# Patient Record
Sex: FEMALE | Race: WHITE | Employment: FULL TIME | URBAN - METROPOLITAN AREA
[De-identification: names, ages, dates, MRNs, and addresses within clinical notes are randomized per-mention and may not be internally consistent; named-entity substitution may affect disease eponyms.]

---

## 2017-01-01 ENCOUNTER — APPOINTMENT (OUTPATIENT)
Dept: RADIATION ONCOLOGY | Age: 57
End: 2017-01-01
Payer: COMMERCIAL

## 2017-01-01 ENCOUNTER — HOME CARE VISIT (OUTPATIENT)
Dept: SCHEDULING | Facility: HOME HEALTH | Age: 57
End: 2017-01-01
Payer: COMMERCIAL

## 2017-01-01 ENCOUNTER — HOME CARE VISIT (OUTPATIENT)
Dept: HOME HEALTH SERVICES | Facility: HOME HEALTH | Age: 57
End: 2017-01-01
Payer: COMMERCIAL

## 2017-01-01 ENCOUNTER — HOSPITAL ENCOUNTER (OUTPATIENT)
Dept: RADIATION ONCOLOGY | Age: 57
Discharge: HOME | End: 2017-03-08
Payer: COMMERCIAL

## 2017-01-01 ENCOUNTER — TELEPHONE (OUTPATIENT)
Dept: ONCOLOGY | Age: 57
End: 2017-01-01

## 2017-01-01 ENCOUNTER — APPOINTMENT (OUTPATIENT)
Dept: GENERAL RADIOLOGY | Age: 57
DRG: 654 | End: 2017-01-01
Attending: SURGERY
Payer: COMMERCIAL

## 2017-01-01 ENCOUNTER — APPOINTMENT (OUTPATIENT)
Dept: GENERAL RADIOLOGY | Age: 57
DRG: 552 | End: 2017-01-01
Attending: EMERGENCY MEDICINE
Payer: COMMERCIAL

## 2017-01-01 ENCOUNTER — HOSPITAL ENCOUNTER (OUTPATIENT)
Dept: INFUSION THERAPY | Age: 57
Discharge: HOME | End: 2017-04-21
Payer: COMMERCIAL

## 2017-01-01 ENCOUNTER — HOSPITAL ENCOUNTER (OUTPATIENT)
Dept: INFUSION THERAPY | Age: 57
Discharge: HOME | End: 2017-03-31
Payer: COMMERCIAL

## 2017-01-01 ENCOUNTER — HOSPITAL ENCOUNTER (OUTPATIENT)
Dept: INFUSION THERAPY | Age: 57
Discharge: HOME | End: 2017-06-30
Payer: COMMERCIAL

## 2017-01-01 ENCOUNTER — HOSPITAL ENCOUNTER (OUTPATIENT)
Dept: RADIATION ONCOLOGY | Age: 57
Discharge: HOME | End: 2017-04-25
Payer: COMMERCIAL

## 2017-01-01 ENCOUNTER — APPOINTMENT (OUTPATIENT)
Dept: INFUSION THERAPY | Age: 57
End: 2017-01-01
Payer: COMMERCIAL

## 2017-01-01 ENCOUNTER — HOSPITAL ENCOUNTER (OUTPATIENT)
Dept: INFUSION THERAPY | Age: 57
Discharge: HOME | End: 2017-06-28
Payer: COMMERCIAL

## 2017-01-01 ENCOUNTER — HOSPICE ADMISSION (OUTPATIENT)
Dept: HOSPICE | Facility: HOSPICE | Age: 57
End: 2017-01-01
Payer: COMMERCIAL

## 2017-01-01 ENCOUNTER — APPOINTMENT (OUTPATIENT)
Dept: MRI IMAGING | Age: 57
DRG: 654 | End: 2017-01-01
Attending: NURSE PRACTITIONER
Payer: COMMERCIAL

## 2017-01-01 ENCOUNTER — HOSPITAL ENCOUNTER (OUTPATIENT)
Dept: RADIATION ONCOLOGY | Age: 57
Discharge: HOME | End: 2017-02-27
Payer: COMMERCIAL

## 2017-01-01 ENCOUNTER — HOSPITAL ENCOUNTER (OUTPATIENT)
Dept: LAB | Age: 57
Discharge: HOME | End: 2017-04-13
Payer: COMMERCIAL

## 2017-01-01 ENCOUNTER — HOSPICE ADMISSION (OUTPATIENT)
Dept: HOSPICE | Facility: HOSPICE | Age: 57
End: 2017-01-01

## 2017-01-01 ENCOUNTER — HOSPITAL ENCOUNTER (OUTPATIENT)
Dept: INFUSION THERAPY | Age: 57
Discharge: HOME | End: 2017-01-18
Payer: COMMERCIAL

## 2017-01-01 ENCOUNTER — HOSPITAL ENCOUNTER (INPATIENT)
Age: 57
LOS: 3 days | Discharge: HOME | DRG: 690 | End: 2017-07-18
Attending: EMERGENCY MEDICINE | Admitting: INTERNAL MEDICINE
Payer: COMMERCIAL

## 2017-01-01 ENCOUNTER — HOSPITAL ENCOUNTER (OUTPATIENT)
Dept: RADIATION ONCOLOGY | Age: 57
End: 2017-01-01
Payer: COMMERCIAL

## 2017-01-01 ENCOUNTER — HOSPITAL ENCOUNTER (OUTPATIENT)
Dept: INFUSION THERAPY | Age: 57
Discharge: HOME | End: 2017-03-17
Payer: COMMERCIAL

## 2017-01-01 ENCOUNTER — APPOINTMENT (OUTPATIENT)
Dept: MRI IMAGING | Age: 57
DRG: 543 | End: 2017-01-01
Attending: NURSE PRACTITIONER
Payer: COMMERCIAL

## 2017-01-01 ENCOUNTER — HOSPITAL ENCOUNTER (OUTPATIENT)
Dept: PET IMAGING | Age: 57
Discharge: HOME | End: 2017-07-27
Attending: INTERNAL MEDICINE
Payer: COMMERCIAL

## 2017-01-01 ENCOUNTER — HOSPITAL ENCOUNTER (OUTPATIENT)
Dept: LAB | Age: 57
Discharge: HOME | End: 2017-10-02
Payer: COMMERCIAL

## 2017-01-01 ENCOUNTER — APPOINTMENT (OUTPATIENT)
Dept: INFUSION THERAPY | Age: 57
End: 2017-01-01

## 2017-01-01 ENCOUNTER — HOSPITAL ENCOUNTER (OUTPATIENT)
Dept: INFUSION THERAPY | Age: 57
End: 2017-01-01
Payer: COMMERCIAL

## 2017-01-01 ENCOUNTER — APPOINTMENT (OUTPATIENT)
Dept: GENERAL RADIOLOGY | Age: 57
DRG: 690 | End: 2017-01-01
Attending: EMERGENCY MEDICINE
Payer: COMMERCIAL

## 2017-01-01 ENCOUNTER — HOSPITAL ENCOUNTER (OUTPATIENT)
Dept: RADIATION ONCOLOGY | Age: 57
Discharge: HOME | End: 2017-03-02
Payer: COMMERCIAL

## 2017-01-01 ENCOUNTER — HOSPITAL ENCOUNTER (OUTPATIENT)
Dept: RADIATION ONCOLOGY | Age: 57
Discharge: HOME | End: 2017-02-28
Payer: COMMERCIAL

## 2017-01-01 ENCOUNTER — HOME CARE VISIT (OUTPATIENT)
Dept: HOME HEALTH SERVICES | Facility: HOME HEALTH | Age: 57
End: 2017-01-01

## 2017-01-01 ENCOUNTER — TELEPHONE (OUTPATIENT)
Dept: MRI IMAGING | Age: 57
End: 2017-01-01

## 2017-01-01 ENCOUNTER — APPOINTMENT (OUTPATIENT)
Dept: GENERAL RADIOLOGY | Age: 57
DRG: 552 | End: 2017-01-01
Attending: INTERNAL MEDICINE
Payer: COMMERCIAL

## 2017-01-01 ENCOUNTER — ANESTHESIA EVENT (OUTPATIENT)
Dept: SURGERY | Age: 57
DRG: 054 | End: 2017-01-01
Payer: COMMERCIAL

## 2017-01-01 ENCOUNTER — HOSPITAL ENCOUNTER (OUTPATIENT)
Dept: RADIATION ONCOLOGY | Age: 57
Discharge: HOME | End: 2017-03-01
Payer: COMMERCIAL

## 2017-01-01 ENCOUNTER — ANESTHESIA (OUTPATIENT)
Dept: SURGERY | Age: 57
DRG: 054 | End: 2017-01-01
Payer: COMMERCIAL

## 2017-01-01 ENCOUNTER — HOSPITAL ENCOUNTER (OUTPATIENT)
Dept: RADIATION ONCOLOGY | Age: 57
Discharge: HOME | End: 2017-02-22
Payer: COMMERCIAL

## 2017-01-01 ENCOUNTER — HOSPITAL ENCOUNTER (OUTPATIENT)
Dept: INFUSION THERAPY | Age: 57
Discharge: HOME | End: 2017-05-03
Payer: COMMERCIAL

## 2017-01-01 ENCOUNTER — HOSPITAL ENCOUNTER (OUTPATIENT)
Dept: CT IMAGING | Age: 57
Discharge: HOME | End: 2017-01-05
Attending: INTERNAL MEDICINE
Payer: COMMERCIAL

## 2017-01-01 ENCOUNTER — HOSPITAL ENCOUNTER (OUTPATIENT)
Dept: LAB | Age: 57
Discharge: HOME | End: 2017-03-08
Payer: COMMERCIAL

## 2017-01-01 ENCOUNTER — HOSPITAL ENCOUNTER (OUTPATIENT)
Dept: LAB | Age: 57
Discharge: HOME | End: 2017-01-31
Payer: COMMERCIAL

## 2017-01-01 ENCOUNTER — APPOINTMENT (OUTPATIENT)
Dept: CT IMAGING | Age: 57
DRG: 690 | End: 2017-01-01
Attending: EMERGENCY MEDICINE
Payer: COMMERCIAL

## 2017-01-01 ENCOUNTER — HOSPITAL ENCOUNTER (OUTPATIENT)
Dept: LAB | Age: 57
Discharge: HOME | End: 2017-05-16
Payer: COMMERCIAL

## 2017-01-01 ENCOUNTER — HOSPITAL ENCOUNTER (OUTPATIENT)
Dept: LAB | Age: 57
Discharge: HOME | End: 2017-06-27
Payer: COMMERCIAL

## 2017-01-01 ENCOUNTER — APPOINTMENT (OUTPATIENT)
Dept: GENERAL RADIOLOGY | Age: 57
DRG: 054 | End: 2017-01-01
Attending: EMERGENCY MEDICINE
Payer: COMMERCIAL

## 2017-01-01 ENCOUNTER — HOSPITAL ENCOUNTER (OUTPATIENT)
Dept: INFUSION THERAPY | Age: 57
Discharge: HOME | End: 2017-02-18
Payer: COMMERCIAL

## 2017-01-01 ENCOUNTER — ANESTHESIA EVENT (OUTPATIENT)
Dept: SURGERY | Age: 57
DRG: 654 | End: 2017-01-01
Payer: COMMERCIAL

## 2017-01-01 ENCOUNTER — HOSPITAL ENCOUNTER (OUTPATIENT)
Dept: INFUSION THERAPY | Age: 57
Discharge: HOME | End: 2017-02-01
Payer: COMMERCIAL

## 2017-01-01 ENCOUNTER — HOSPITAL ENCOUNTER (OUTPATIENT)
Dept: INFUSION THERAPY | Age: 57
End: 2017-01-01

## 2017-01-01 ENCOUNTER — HOSPITAL ENCOUNTER (OUTPATIENT)
Dept: INFUSION THERAPY | Age: 57
Discharge: HOME | End: 2017-04-19
Payer: COMMERCIAL

## 2017-01-01 ENCOUNTER — HOSPITAL ENCOUNTER (OUTPATIENT)
Dept: PET IMAGING | Age: 57
Discharge: HOME | End: 2017-04-11
Attending: INTERNAL MEDICINE
Payer: COMMERCIAL

## 2017-01-01 ENCOUNTER — HOSPITAL ENCOUNTER (OUTPATIENT)
Dept: RADIATION ONCOLOGY | Age: 57
Discharge: HOME | End: 2017-03-07
Payer: COMMERCIAL

## 2017-01-01 ENCOUNTER — HOSPITAL ENCOUNTER (OUTPATIENT)
Dept: LAB | Age: 57
Discharge: HOME | End: 2017-03-28
Payer: COMMERCIAL

## 2017-01-01 ENCOUNTER — HOSPITAL ENCOUNTER (INPATIENT)
Age: 57
LOS: 4 days | Discharge: HOME HEALTH | DRG: 054 | End: 2017-09-05
Attending: EMERGENCY MEDICINE | Admitting: INTERNAL MEDICINE
Payer: COMMERCIAL

## 2017-01-01 ENCOUNTER — HOSPITAL ENCOUNTER (INPATIENT)
Age: 57
LOS: 4 days | Discharge: HOME | DRG: 552 | End: 2017-08-18
Attending: EMERGENCY MEDICINE | Admitting: INTERNAL MEDICINE
Payer: COMMERCIAL

## 2017-01-01 ENCOUNTER — HOSPITAL ENCOUNTER (OUTPATIENT)
Dept: INFUSION THERAPY | Age: 57
Discharge: HOME | End: 2017-05-17
Payer: COMMERCIAL

## 2017-01-01 ENCOUNTER — HOSPITAL ENCOUNTER (OUTPATIENT)
Dept: LAB | Age: 57
Discharge: HOME | End: 2017-05-02
Payer: COMMERCIAL

## 2017-01-01 ENCOUNTER — HOSPITAL ENCOUNTER (OUTPATIENT)
Dept: LAB | Age: 57
Discharge: HOME | End: 2017-01-10
Payer: COMMERCIAL

## 2017-01-01 ENCOUNTER — HOSPITAL ENCOUNTER (OUTPATIENT)
Dept: RADIATION ONCOLOGY | Age: 57
Discharge: HOME | End: 2017-02-23
Payer: COMMERCIAL

## 2017-01-01 ENCOUNTER — HOSPITAL ENCOUNTER (OUTPATIENT)
Dept: RADIATION ONCOLOGY | Age: 57
Discharge: HOME | End: 2017-02-24
Payer: COMMERCIAL

## 2017-01-01 ENCOUNTER — HOSPITAL ENCOUNTER (OUTPATIENT)
Dept: RADIATION ONCOLOGY | Age: 57
End: 2017-01-01

## 2017-01-01 ENCOUNTER — APPOINTMENT (OUTPATIENT)
Dept: MRI IMAGING | Age: 57
DRG: 054 | End: 2017-01-01
Attending: HOSPITALIST
Payer: COMMERCIAL

## 2017-01-01 ENCOUNTER — HOSPITAL ENCOUNTER (OUTPATIENT)
Dept: INFUSION THERAPY | Age: 57
Discharge: HOME | End: 2017-03-29
Payer: COMMERCIAL

## 2017-01-01 ENCOUNTER — ANESTHESIA (OUTPATIENT)
Dept: SURGERY | Age: 57
DRG: 654 | End: 2017-01-01
Payer: COMMERCIAL

## 2017-01-01 ENCOUNTER — HOSPITAL ENCOUNTER (OUTPATIENT)
Dept: LAB | Age: 57
Discharge: HOME | End: 2017-07-24

## 2017-01-01 ENCOUNTER — HOME HEALTH ADMISSION (OUTPATIENT)
Dept: HOME HEALTH SERVICES | Facility: HOME HEALTH | Age: 57
End: 2017-01-01

## 2017-01-01 ENCOUNTER — HOSPITAL ENCOUNTER (OUTPATIENT)
Dept: INFUSION THERAPY | Age: 57
Discharge: HOME | End: 2017-02-03
Payer: COMMERCIAL

## 2017-01-01 ENCOUNTER — HOSPITAL ENCOUNTER (OUTPATIENT)
Dept: INFUSION THERAPY | Age: 57
Discharge: HOME | End: 2017-01-10
Payer: COMMERCIAL

## 2017-01-01 ENCOUNTER — HOME CARE VISIT (OUTPATIENT)
Dept: SCHEDULING | Facility: HOME HEALTH | Age: 57
End: 2017-01-01

## 2017-01-01 ENCOUNTER — HOSPITAL ENCOUNTER (OUTPATIENT)
Dept: INFUSION THERAPY | Age: 57
Discharge: HOME | End: 2017-04-04
Payer: COMMERCIAL

## 2017-01-01 ENCOUNTER — HOME HEALTH ADMISSION (OUTPATIENT)
Dept: HOME HEALTH SERVICES | Facility: HOME HEALTH | Age: 57
End: 2017-01-01
Payer: COMMERCIAL

## 2017-01-01 ENCOUNTER — HOSPITAL ENCOUNTER (OUTPATIENT)
Dept: RADIATION ONCOLOGY | Age: 57
Discharge: HOME | End: 2017-10-10
Payer: COMMERCIAL

## 2017-01-01 ENCOUNTER — HOSPITAL ENCOUNTER (OUTPATIENT)
Dept: RADIATION ONCOLOGY | Age: 57
Discharge: HOME | End: 2017-03-03
Payer: COMMERCIAL

## 2017-01-01 ENCOUNTER — HOSPITAL ENCOUNTER (INPATIENT)
Age: 57
LOS: 3 days | Discharge: HOME | DRG: 543 | End: 2017-02-24
Attending: EMERGENCY MEDICINE | Admitting: INTERNAL MEDICINE
Payer: COMMERCIAL

## 2017-01-01 ENCOUNTER — HOSPITAL ENCOUNTER (OUTPATIENT)
Dept: INFUSION THERAPY | Age: 57
Discharge: HOME | End: 2017-05-19
Payer: COMMERCIAL

## 2017-01-01 ENCOUNTER — HOSPITAL ENCOUNTER (OUTPATIENT)
Dept: LAB | Age: 57
Discharge: HOME | End: 2017-02-14
Payer: COMMERCIAL

## 2017-01-01 ENCOUNTER — HOSPITAL ENCOUNTER (OUTPATIENT)
Dept: RADIATION ONCOLOGY | Age: 57
Discharge: HOME | End: 2017-03-06
Payer: COMMERCIAL

## 2017-01-01 ENCOUNTER — HOSPITAL ENCOUNTER (OUTPATIENT)
Dept: INFUSION THERAPY | Age: 57
Discharge: HOME | End: 2017-01-20
Payer: COMMERCIAL

## 2017-01-01 ENCOUNTER — APPOINTMENT (OUTPATIENT)
Dept: CT IMAGING | Age: 57
DRG: 054 | End: 2017-01-01
Attending: EMERGENCY MEDICINE
Payer: COMMERCIAL

## 2017-01-01 ENCOUNTER — HOSPITAL ENCOUNTER (OUTPATIENT)
Dept: INFUSION THERAPY | Age: 57
Discharge: HOME | End: 2017-05-05
Payer: COMMERCIAL

## 2017-01-01 ENCOUNTER — HOSPITAL ENCOUNTER (INPATIENT)
Age: 57
LOS: 7 days | Discharge: HOME HEALTH | DRG: 654 | End: 2017-08-30
Attending: SURGERY | Admitting: SURGERY
Payer: COMMERCIAL

## 2017-01-01 ENCOUNTER — HOSPITAL ENCOUNTER (OUTPATIENT)
Dept: INFUSION THERAPY | Age: 57
Discharge: HOME | End: 2017-03-15
Payer: COMMERCIAL

## 2017-01-01 VITALS
TEMPERATURE: 98.6 F | SYSTOLIC BLOOD PRESSURE: 130 MMHG | DIASTOLIC BLOOD PRESSURE: 60 MMHG | HEART RATE: 89 BPM | OXYGEN SATURATION: 96 %

## 2017-01-01 VITALS
DIASTOLIC BLOOD PRESSURE: 86 MMHG | RESPIRATION RATE: 18 BRPM | OXYGEN SATURATION: 96 % | WEIGHT: 166.8 LBS | HEART RATE: 118 BPM | BODY MASS INDEX: 27.76 KG/M2 | TEMPERATURE: 98.2 F | SYSTOLIC BLOOD PRESSURE: 148 MMHG

## 2017-01-01 VITALS
HEART RATE: 146 BPM | RESPIRATION RATE: 16 BRPM | OXYGEN SATURATION: 93 % | SYSTOLIC BLOOD PRESSURE: 130 MMHG | TEMPERATURE: 97.4 F | DIASTOLIC BLOOD PRESSURE: 88 MMHG

## 2017-01-01 VITALS
BODY MASS INDEX: 27.62 KG/M2 | RESPIRATION RATE: 18 BRPM | TEMPERATURE: 98.6 F | SYSTOLIC BLOOD PRESSURE: 120 MMHG | HEART RATE: 142 BPM | WEIGHT: 166 LBS | OXYGEN SATURATION: 97 % | DIASTOLIC BLOOD PRESSURE: 84 MMHG

## 2017-01-01 VITALS
TEMPERATURE: 98.5 F | HEART RATE: 103 BPM | DIASTOLIC BLOOD PRESSURE: 74 MMHG | BODY MASS INDEX: 28.32 KG/M2 | RESPIRATION RATE: 18 BRPM | SYSTOLIC BLOOD PRESSURE: 120 MMHG | WEIGHT: 170.2 LBS | OXYGEN SATURATION: 98 %

## 2017-01-01 VITALS — BODY MASS INDEX: 27.99 KG/M2 | WEIGHT: 168 LBS | HEIGHT: 65 IN

## 2017-01-01 VITALS
TEMPERATURE: 98.4 F | WEIGHT: 168 LBS | DIASTOLIC BLOOD PRESSURE: 87 MMHG | BODY MASS INDEX: 27.96 KG/M2 | HEART RATE: 110 BPM | OXYGEN SATURATION: 96 % | RESPIRATION RATE: 18 BRPM | SYSTOLIC BLOOD PRESSURE: 138 MMHG

## 2017-01-01 VITALS
HEART RATE: 76 BPM | OXYGEN SATURATION: 97 % | TEMPERATURE: 98 F | SYSTOLIC BLOOD PRESSURE: 130 MMHG | DIASTOLIC BLOOD PRESSURE: 70 MMHG

## 2017-01-01 VITALS
OXYGEN SATURATION: 96 % | SYSTOLIC BLOOD PRESSURE: 124 MMHG | HEART RATE: 90 BPM | DIASTOLIC BLOOD PRESSURE: 80 MMHG | RESPIRATION RATE: 16 BRPM

## 2017-01-01 VITALS
SYSTOLIC BLOOD PRESSURE: 130 MMHG | DIASTOLIC BLOOD PRESSURE: 78 MMHG | RESPIRATION RATE: 17 BRPM | OXYGEN SATURATION: 97 % | TEMPERATURE: 99.7 F | HEART RATE: 90 BPM

## 2017-01-01 VITALS
OXYGEN SATURATION: 100 % | DIASTOLIC BLOOD PRESSURE: 84 MMHG | SYSTOLIC BLOOD PRESSURE: 142 MMHG | HEART RATE: 110 BPM | RESPIRATION RATE: 18 BRPM | BODY MASS INDEX: 29.2 KG/M2 | TEMPERATURE: 98.2 F | WEIGHT: 175.5 LBS

## 2017-01-01 VITALS
HEART RATE: 154 BPM | OXYGEN SATURATION: 94 % | DIASTOLIC BLOOD PRESSURE: 90 MMHG | SYSTOLIC BLOOD PRESSURE: 140 MMHG | RESPIRATION RATE: 14 BRPM | TEMPERATURE: 97.9 F

## 2017-01-01 VITALS
SYSTOLIC BLOOD PRESSURE: 148 MMHG | HEART RATE: 128 BPM | OXYGEN SATURATION: 99 % | RESPIRATION RATE: 18 BRPM | TEMPERATURE: 98 F | DIASTOLIC BLOOD PRESSURE: 82 MMHG | BODY MASS INDEX: 27.59 KG/M2 | WEIGHT: 165.8 LBS

## 2017-01-01 VITALS
WEIGHT: 170.8 LBS | TEMPERATURE: 98.9 F | HEART RATE: 106 BPM | OXYGEN SATURATION: 100 % | SYSTOLIC BLOOD PRESSURE: 141 MMHG | BODY MASS INDEX: 28.42 KG/M2 | RESPIRATION RATE: 18 BRPM | DIASTOLIC BLOOD PRESSURE: 94 MMHG

## 2017-01-01 VITALS
RESPIRATION RATE: 16 BRPM | WEIGHT: 164 LBS | OXYGEN SATURATION: 95 % | HEART RATE: 112 BPM | TEMPERATURE: 98 F | BODY MASS INDEX: 27.29 KG/M2 | DIASTOLIC BLOOD PRESSURE: 85 MMHG | SYSTOLIC BLOOD PRESSURE: 124 MMHG

## 2017-01-01 VITALS
TEMPERATURE: 98.4 F | RESPIRATION RATE: 18 BRPM | WEIGHT: 147.3 LBS | OXYGEN SATURATION: 100 % | HEART RATE: 55 BPM | RESPIRATION RATE: 18 BRPM | BODY MASS INDEX: 23.67 KG/M2 | DIASTOLIC BLOOD PRESSURE: 71 MMHG | HEART RATE: 156 BPM | SYSTOLIC BLOOD PRESSURE: 117 MMHG | SYSTOLIC BLOOD PRESSURE: 159 MMHG | HEIGHT: 66 IN | OXYGEN SATURATION: 98 % | TEMPERATURE: 98.1 F | DIASTOLIC BLOOD PRESSURE: 72 MMHG

## 2017-01-01 VITALS
RESPIRATION RATE: 15 BRPM | WEIGHT: 148.6 LBS | TEMPERATURE: 97.5 F | DIASTOLIC BLOOD PRESSURE: 83 MMHG | BODY MASS INDEX: 24.76 KG/M2 | HEART RATE: 109 BPM | OXYGEN SATURATION: 95 % | HEIGHT: 65 IN | SYSTOLIC BLOOD PRESSURE: 144 MMHG

## 2017-01-01 VITALS
DIASTOLIC BLOOD PRESSURE: 76 MMHG | TEMPERATURE: 97.2 F | SYSTOLIC BLOOD PRESSURE: 132 MMHG | OXYGEN SATURATION: 98 % | RESPIRATION RATE: 18 BRPM | HEART RATE: 84 BPM

## 2017-01-01 VITALS
SYSTOLIC BLOOD PRESSURE: 146 MMHG | TEMPERATURE: 98.2 F | BODY MASS INDEX: 29.32 KG/M2 | RESPIRATION RATE: 18 BRPM | HEART RATE: 119 BPM | WEIGHT: 176.2 LBS | DIASTOLIC BLOOD PRESSURE: 87 MMHG | OXYGEN SATURATION: 98 %

## 2017-01-01 VITALS
TEMPERATURE: 98.4 F | HEART RATE: 95 BPM | RESPIRATION RATE: 18 BRPM | SYSTOLIC BLOOD PRESSURE: 136 MMHG | WEIGHT: 172.6 LBS | DIASTOLIC BLOOD PRESSURE: 92 MMHG | OXYGEN SATURATION: 99 % | BODY MASS INDEX: 28.72 KG/M2

## 2017-01-01 VITALS
HEART RATE: 90 BPM | OXYGEN SATURATION: 97 % | RESPIRATION RATE: 17 BRPM | TEMPERATURE: 98.3 F | DIASTOLIC BLOOD PRESSURE: 81 MMHG | SYSTOLIC BLOOD PRESSURE: 130 MMHG

## 2017-01-01 VITALS
WEIGHT: 165.2 LBS | TEMPERATURE: 97.6 F | RESPIRATION RATE: 18 BRPM | DIASTOLIC BLOOD PRESSURE: 76 MMHG | SYSTOLIC BLOOD PRESSURE: 119 MMHG | BODY MASS INDEX: 27.49 KG/M2 | HEART RATE: 116 BPM | OXYGEN SATURATION: 95 %

## 2017-01-01 VITALS
DIASTOLIC BLOOD PRESSURE: 72 MMHG | RESPIRATION RATE: 18 BRPM | TEMPERATURE: 98.2 F | HEART RATE: 78 BPM | SYSTOLIC BLOOD PRESSURE: 142 MMHG

## 2017-01-01 VITALS
OXYGEN SATURATION: 98 % | DIASTOLIC BLOOD PRESSURE: 93 MMHG | SYSTOLIC BLOOD PRESSURE: 145 MMHG | WEIGHT: 173.6 LBS | BODY MASS INDEX: 28.89 KG/M2 | TEMPERATURE: 97.8 F | RESPIRATION RATE: 16 BRPM | HEART RATE: 140 BPM

## 2017-01-01 VITALS
DIASTOLIC BLOOD PRESSURE: 82 MMHG | TEMPERATURE: 97.9 F | RESPIRATION RATE: 16 BRPM | HEART RATE: 82 BPM | OXYGEN SATURATION: 97 % | SYSTOLIC BLOOD PRESSURE: 160 MMHG

## 2017-01-01 VITALS
TEMPERATURE: 97.8 F | OXYGEN SATURATION: 95 % | HEIGHT: 65 IN | DIASTOLIC BLOOD PRESSURE: 94 MMHG | HEART RATE: 114 BPM | WEIGHT: 174.2 LBS | SYSTOLIC BLOOD PRESSURE: 150 MMHG | BODY MASS INDEX: 29.02 KG/M2 | RESPIRATION RATE: 16 BRPM

## 2017-01-01 VITALS
HEART RATE: 114 BPM | OXYGEN SATURATION: 97 % | WEIGHT: 159.8 LBS | RESPIRATION RATE: 18 BRPM | BODY MASS INDEX: 26.59 KG/M2 | SYSTOLIC BLOOD PRESSURE: 127 MMHG | TEMPERATURE: 97.8 F | DIASTOLIC BLOOD PRESSURE: 75 MMHG

## 2017-01-01 VITALS
HEART RATE: 89 BPM | DIASTOLIC BLOOD PRESSURE: 85 MMHG | RESPIRATION RATE: 16 BRPM | TEMPERATURE: 97 F | OXYGEN SATURATION: 97 % | SYSTOLIC BLOOD PRESSURE: 140 MMHG

## 2017-01-01 VITALS
BODY MASS INDEX: 28.02 KG/M2 | SYSTOLIC BLOOD PRESSURE: 123 MMHG | HEART RATE: 127 BPM | TEMPERATURE: 98 F | DIASTOLIC BLOOD PRESSURE: 89 MMHG | WEIGHT: 168.4 LBS | RESPIRATION RATE: 16 BRPM | OXYGEN SATURATION: 97 %

## 2017-01-01 VITALS
OXYGEN SATURATION: 98 % | DIASTOLIC BLOOD PRESSURE: 60 MMHG | RESPIRATION RATE: 16 BRPM | HEART RATE: 93 BPM | SYSTOLIC BLOOD PRESSURE: 118 MMHG | TEMPERATURE: 97.6 F

## 2017-01-01 VITALS
DIASTOLIC BLOOD PRESSURE: 88 MMHG | OXYGEN SATURATION: 98 % | RESPIRATION RATE: 16 BRPM | TEMPERATURE: 98 F | SYSTOLIC BLOOD PRESSURE: 140 MMHG | HEART RATE: 149 BPM

## 2017-01-01 VITALS
RESPIRATION RATE: 16 BRPM | TEMPERATURE: 98.4 F | HEART RATE: 92 BPM | OXYGEN SATURATION: 96 % | DIASTOLIC BLOOD PRESSURE: 80 MMHG | SYSTOLIC BLOOD PRESSURE: 130 MMHG

## 2017-01-01 VITALS
OXYGEN SATURATION: 96 % | RESPIRATION RATE: 18 BRPM | HEIGHT: 66 IN | HEART RATE: 125 BPM | DIASTOLIC BLOOD PRESSURE: 94 MMHG | TEMPERATURE: 98.5 F | SYSTOLIC BLOOD PRESSURE: 154 MMHG | WEIGHT: 145 LBS | BODY MASS INDEX: 23.3 KG/M2

## 2017-01-01 VITALS
DIASTOLIC BLOOD PRESSURE: 62 MMHG | HEART RATE: 67 BPM | RESPIRATION RATE: 18 BRPM | SYSTOLIC BLOOD PRESSURE: 102 MMHG | TEMPERATURE: 98.4 F | OXYGEN SATURATION: 97 %

## 2017-01-01 VITALS
OXYGEN SATURATION: 97 % | DIASTOLIC BLOOD PRESSURE: 82 MMHG | WEIGHT: 166.5 LBS | TEMPERATURE: 98 F | HEART RATE: 120 BPM | SYSTOLIC BLOOD PRESSURE: 155 MMHG | RESPIRATION RATE: 18 BRPM | BODY MASS INDEX: 27.71 KG/M2

## 2017-01-01 VITALS
SYSTOLIC BLOOD PRESSURE: 130 MMHG | DIASTOLIC BLOOD PRESSURE: 78 MMHG | HEART RATE: 78 BPM | RESPIRATION RATE: 18 BRPM | TEMPERATURE: 96.8 F

## 2017-01-01 VITALS
DIASTOLIC BLOOD PRESSURE: 85 MMHG | BODY MASS INDEX: 28.56 KG/M2 | WEIGHT: 171.6 LBS | RESPIRATION RATE: 16 BRPM | SYSTOLIC BLOOD PRESSURE: 129 MMHG | OXYGEN SATURATION: 95 % | TEMPERATURE: 98 F | HEART RATE: 132 BPM

## 2017-01-01 VITALS
DIASTOLIC BLOOD PRESSURE: 76 MMHG | RESPIRATION RATE: 18 BRPM | TEMPERATURE: 96.2 F | HEART RATE: 72 BPM | SYSTOLIC BLOOD PRESSURE: 130 MMHG

## 2017-01-01 VITALS
OXYGEN SATURATION: 92 % | TEMPERATURE: 98 F | HEART RATE: 100 BPM | SYSTOLIC BLOOD PRESSURE: 101 MMHG | DIASTOLIC BLOOD PRESSURE: 70 MMHG

## 2017-01-01 VITALS
SYSTOLIC BLOOD PRESSURE: 124 MMHG | HEART RATE: 133 BPM | DIASTOLIC BLOOD PRESSURE: 88 MMHG | OXYGEN SATURATION: 98 % | TEMPERATURE: 96.5 F | RESPIRATION RATE: 20 BRPM

## 2017-01-01 VITALS
TEMPERATURE: 97.2 F | HEART RATE: 72 BPM | SYSTOLIC BLOOD PRESSURE: 124 MMHG | DIASTOLIC BLOOD PRESSURE: 62 MMHG | RESPIRATION RATE: 18 BRPM

## 2017-01-01 VITALS
DIASTOLIC BLOOD PRESSURE: 94 MMHG | HEART RATE: 101 BPM | HEIGHT: 65 IN | SYSTOLIC BLOOD PRESSURE: 164 MMHG | TEMPERATURE: 97.2 F | RESPIRATION RATE: 18 BRPM | BODY MASS INDEX: 26.35 KG/M2 | OXYGEN SATURATION: 97 % | WEIGHT: 158.13 LBS

## 2017-01-01 VITALS
OXYGEN SATURATION: 99 % | RESPIRATION RATE: 18 BRPM | SYSTOLIC BLOOD PRESSURE: 129 MMHG | WEIGHT: 167 LBS | HEART RATE: 116 BPM | TEMPERATURE: 97.4 F | BODY MASS INDEX: 27.79 KG/M2 | DIASTOLIC BLOOD PRESSURE: 84 MMHG

## 2017-01-01 VITALS
SYSTOLIC BLOOD PRESSURE: 140 MMHG | TEMPERATURE: 97 F | OXYGEN SATURATION: 97 % | RESPIRATION RATE: 16 BRPM | HEART RATE: 89 BPM | DIASTOLIC BLOOD PRESSURE: 85 MMHG

## 2017-01-01 VITALS
RESPIRATION RATE: 22 BRPM | SYSTOLIC BLOOD PRESSURE: 140 MMHG | DIASTOLIC BLOOD PRESSURE: 80 MMHG | OXYGEN SATURATION: 97 % | HEART RATE: 150 BPM | TEMPERATURE: 99 F

## 2017-01-01 VITALS
SYSTOLIC BLOOD PRESSURE: 142 MMHG | BODY MASS INDEX: 28.86 KG/M2 | RESPIRATION RATE: 16 BRPM | OXYGEN SATURATION: 93 % | DIASTOLIC BLOOD PRESSURE: 92 MMHG | HEART RATE: 123 BPM | WEIGHT: 173.4 LBS | TEMPERATURE: 97.3 F

## 2017-01-01 VITALS
RESPIRATION RATE: 18 BRPM | DIASTOLIC BLOOD PRESSURE: 78 MMHG | HEART RATE: 82 BPM | SYSTOLIC BLOOD PRESSURE: 132 MMHG | TEMPERATURE: 98.3 F

## 2017-01-01 VITALS
TEMPERATURE: 97.8 F | DIASTOLIC BLOOD PRESSURE: 86 MMHG | HEART RATE: 140 BPM | OXYGEN SATURATION: 93 % | SYSTOLIC BLOOD PRESSURE: 114 MMHG

## 2017-01-01 VITALS
HEART RATE: 85 BPM | SYSTOLIC BLOOD PRESSURE: 108 MMHG | TEMPERATURE: 98.6 F | RESPIRATION RATE: 18 BRPM | DIASTOLIC BLOOD PRESSURE: 64 MMHG

## 2017-01-01 VITALS
TEMPERATURE: 97.7 F | HEART RATE: 94 BPM | DIASTOLIC BLOOD PRESSURE: 88 MMHG | SYSTOLIC BLOOD PRESSURE: 160 MMHG | RESPIRATION RATE: 16 BRPM | OXYGEN SATURATION: 96 %

## 2017-01-01 VITALS
SYSTOLIC BLOOD PRESSURE: 136 MMHG | RESPIRATION RATE: 16 BRPM | HEART RATE: 156 BPM | DIASTOLIC BLOOD PRESSURE: 84 MMHG | TEMPERATURE: 97 F | OXYGEN SATURATION: 96 %

## 2017-01-01 VITALS
SYSTOLIC BLOOD PRESSURE: 125 MMHG | HEART RATE: 116 BPM | TEMPERATURE: 97.6 F | BODY MASS INDEX: 26.66 KG/M2 | WEIGHT: 160.2 LBS | RESPIRATION RATE: 18 BRPM | OXYGEN SATURATION: 97 % | DIASTOLIC BLOOD PRESSURE: 75 MMHG

## 2017-01-01 VITALS
HEART RATE: 80 BPM | TEMPERATURE: 97.6 F | DIASTOLIC BLOOD PRESSURE: 86 MMHG | SYSTOLIC BLOOD PRESSURE: 138 MMHG | RESPIRATION RATE: 16 BRPM

## 2017-01-01 DIAGNOSIS — T45.1X5A ANEMIA ASSOCIATED WITH CHEMOTHERAPY: ICD-10-CM

## 2017-01-01 DIAGNOSIS — C18.7 MALIGNANT NEOPLASM OF SIGMOID COLON (HCC): ICD-10-CM

## 2017-01-01 DIAGNOSIS — C79.31 BRAIN METASTASES (HCC): Primary | ICD-10-CM

## 2017-01-01 DIAGNOSIS — C18.6 MALIGNANT NEOPLASM OF DESCENDING COLON (HCC): ICD-10-CM

## 2017-01-01 DIAGNOSIS — D64.81 ANEMIA ASSOCIATED WITH CHEMOTHERAPY: ICD-10-CM

## 2017-01-01 DIAGNOSIS — R53.81 PHYSICAL DEBILITY: ICD-10-CM

## 2017-01-01 DIAGNOSIS — C18.9 MALIGNANT NEOPLASM OF COLON, UNSPECIFIED PART OF COLON (HCC): ICD-10-CM

## 2017-01-01 DIAGNOSIS — R50.9 ACUTE FEBRILE ILLNESS: Primary | ICD-10-CM

## 2017-01-01 DIAGNOSIS — R52 PAIN: ICD-10-CM

## 2017-01-01 DIAGNOSIS — N32.1 COLOVESICAL FISTULA: ICD-10-CM

## 2017-01-01 DIAGNOSIS — R31.9 URINARY TRACT INFECTION WITH HEMATURIA, SITE UNSPECIFIED: ICD-10-CM

## 2017-01-01 DIAGNOSIS — R52 INTRACTABLE PAIN: ICD-10-CM

## 2017-01-01 DIAGNOSIS — D49.6 BRAIN TUMOR (HCC): ICD-10-CM

## 2017-01-01 DIAGNOSIS — E87.6 HYPOKALEMIA: ICD-10-CM

## 2017-01-01 DIAGNOSIS — C18.7 CANCER OF SIGMOID COLON (HCC): ICD-10-CM

## 2017-01-01 DIAGNOSIS — R53.1 LEFT-SIDED WEAKNESS: Primary | ICD-10-CM

## 2017-01-01 DIAGNOSIS — N39.0 URINARY TRACT INFECTION WITH HEMATURIA, SITE UNSPECIFIED: ICD-10-CM

## 2017-01-01 DIAGNOSIS — G93.40 ENCEPHALOPATHY ACUTE: ICD-10-CM

## 2017-01-01 DIAGNOSIS — I63.9 CEREBROVASCULAR ACCIDENT (CVA), UNSPECIFIED MECHANISM (HCC): ICD-10-CM

## 2017-01-01 DIAGNOSIS — R10.32 ABDOMINAL PAIN, LLQ (LEFT LOWER QUADRANT): Primary | ICD-10-CM

## 2017-01-01 DIAGNOSIS — C18.9 METASTATIC COLON CANCER IN FEMALE (HCC): ICD-10-CM

## 2017-01-01 DIAGNOSIS — M54.5 LOW BACK PAIN, UNSPECIFIED BACK PAIN LATERALITY, UNSPECIFIED CHRONICITY, WITH SCIATICA PRESENCE UNSPECIFIED: ICD-10-CM

## 2017-01-01 DIAGNOSIS — R55 NEAR SYNCOPE: ICD-10-CM

## 2017-01-01 DIAGNOSIS — R45.89 DIFFICULTY COPING WITH DISEASE: ICD-10-CM

## 2017-01-01 LAB
ABO + RH BLD: NORMAL
ALBUMIN SERPL BCP-MCNC: 1.9 G/DL (ref 3.5–5)
ALBUMIN SERPL BCP-MCNC: 2 G/DL (ref 3.5–5)
ALBUMIN SERPL BCP-MCNC: 2.3 G/DL (ref 3.5–5)
ALBUMIN SERPL BCP-MCNC: 2.4 G/DL (ref 3.5–5)
ALBUMIN SERPL BCP-MCNC: 2.5 G/DL (ref 3.5–5)
ALBUMIN SERPL BCP-MCNC: 2.6 G/DL (ref 3.5–5)
ALBUMIN SERPL BCP-MCNC: 2.6 G/DL (ref 3.5–5)
ALBUMIN SERPL BCP-MCNC: 2.7 G/DL (ref 3.5–5)
ALBUMIN SERPL BCP-MCNC: 2.8 G/DL (ref 3.5–5)
ALBUMIN SERPL BCP-MCNC: 2.9 G/DL (ref 3.5–5)
ALBUMIN SERPL BCP-MCNC: 3 G/DL (ref 3.5–5)
ALBUMIN SERPL BCP-MCNC: 3.1 G/DL (ref 3.5–5)
ALBUMIN SERPL-MCNC: 1.7 G/DL (ref 3.5–5)
ALBUMIN SERPL-MCNC: 1.8 G/DL (ref 3.5–5)
ALBUMIN SERPL-MCNC: 2.2 G/DL (ref 3.5–5)
ALBUMIN/GLOB SERPL: 0.3 {RATIO} (ref 1.2–3.5)
ALBUMIN/GLOB SERPL: 0.4 {RATIO} (ref 1.2–3.5)
ALBUMIN/GLOB SERPL: 0.5 {RATIO} (ref 1.2–3.5)
ALBUMIN/GLOB SERPL: 0.6 {RATIO} (ref 1.2–3.5)
ALBUMIN/GLOB SERPL: 0.7 {RATIO} (ref 1.2–3.5)
ALP SERPL-CCNC: 100 U/L (ref 50–136)
ALP SERPL-CCNC: 100 U/L (ref 50–136)
ALP SERPL-CCNC: 101 U/L (ref 50–136)
ALP SERPL-CCNC: 101 U/L (ref 50–136)
ALP SERPL-CCNC: 102 U/L (ref 50–136)
ALP SERPL-CCNC: 102 U/L (ref 50–136)
ALP SERPL-CCNC: 103 U/L (ref 50–136)
ALP SERPL-CCNC: 107 U/L (ref 50–136)
ALP SERPL-CCNC: 112 U/L (ref 50–136)
ALP SERPL-CCNC: 112 U/L (ref 50–136)
ALP SERPL-CCNC: 116 U/L (ref 50–136)
ALP SERPL-CCNC: 117 U/L (ref 50–136)
ALP SERPL-CCNC: 119 U/L (ref 50–136)
ALP SERPL-CCNC: 120 U/L (ref 50–136)
ALP SERPL-CCNC: 127 U/L (ref 50–136)
ALP SERPL-CCNC: 130 U/L (ref 50–136)
ALP SERPL-CCNC: 138 U/L (ref 50–136)
ALP SERPL-CCNC: 138 U/L (ref 50–136)
ALP SERPL-CCNC: 164 U/L (ref 50–136)
ALP SERPL-CCNC: 178 U/L (ref 50–136)
ALP SERPL-CCNC: 217 U/L (ref 50–136)
ALP SERPL-CCNC: 279 U/L (ref 50–136)
ALP SERPL-CCNC: 97 U/L (ref 50–136)
ALP SERPL-CCNC: 98 U/L (ref 50–136)
ALT SERPL-CCNC: 10 U/L (ref 12–65)
ALT SERPL-CCNC: 10 U/L (ref 12–65)
ALT SERPL-CCNC: 13 U/L (ref 12–65)
ALT SERPL-CCNC: 13 U/L (ref 12–65)
ALT SERPL-CCNC: 15 U/L (ref 12–65)
ALT SERPL-CCNC: 17 U/L (ref 12–65)
ALT SERPL-CCNC: 17 U/L (ref 12–65)
ALT SERPL-CCNC: 18 U/L (ref 12–65)
ALT SERPL-CCNC: 20 U/L (ref 12–65)
ALT SERPL-CCNC: 23 U/L (ref 12–65)
ALT SERPL-CCNC: 24 U/L (ref 12–65)
ALT SERPL-CCNC: 25 U/L (ref 12–65)
ALT SERPL-CCNC: 25 U/L (ref 12–65)
ALT SERPL-CCNC: 27 U/L (ref 12–65)
ALT SERPL-CCNC: 30 U/L (ref 12–65)
ALT SERPL-CCNC: 32 U/L (ref 12–65)
ALT SERPL-CCNC: 34 U/L (ref 12–65)
ALT SERPL-CCNC: 34 U/L (ref 12–65)
ALT SERPL-CCNC: 7 U/L (ref 12–65)
ALT SERPL-CCNC: 9 U/L (ref 12–65)
AMORPH CRY URNS QL MICRO: ABNORMAL
ANION GAP BLD CALC-SCNC: 10 MMOL/L (ref 7–16)
ANION GAP BLD CALC-SCNC: 10 MMOL/L (ref 7–16)
ANION GAP BLD CALC-SCNC: 11 MMOL/L (ref 7–16)
ANION GAP BLD CALC-SCNC: 4 MMOL/L (ref 7–16)
ANION GAP BLD CALC-SCNC: 5 MMOL/L (ref 7–16)
ANION GAP BLD CALC-SCNC: 5 MMOL/L (ref 7–16)
ANION GAP BLD CALC-SCNC: 6 MMOL/L (ref 7–16)
ANION GAP BLD CALC-SCNC: 7 MMOL/L (ref 7–16)
ANION GAP BLD CALC-SCNC: 8 MMOL/L (ref 7–16)
ANION GAP BLD CALC-SCNC: 9 MMOL/L (ref 7–16)
ANION GAP SERPL CALC-SCNC: 10 MMOL/L (ref 7–16)
ANION GAP SERPL CALC-SCNC: 13 MMOL/L (ref 7–16)
ANION GAP SERPL CALC-SCNC: 4 MMOL/L (ref 7–16)
ANION GAP SERPL CALC-SCNC: 5 MMOL/L (ref 7–16)
ANION GAP SERPL CALC-SCNC: 7 MMOL/L (ref 7–16)
ANION GAP SERPL CALC-SCNC: 8 MMOL/L (ref 7–16)
ANION GAP SERPL CALC-SCNC: 9 MMOL/L (ref 7–16)
APPEARANCE UR: ABNORMAL
APPEARANCE UR: ABNORMAL
APTT PPP: 33.5 SEC (ref 23.5–31.7)
AST SERPL W P-5'-P-CCNC: 14 U/L (ref 15–37)
AST SERPL W P-5'-P-CCNC: 15 U/L (ref 15–37)
AST SERPL W P-5'-P-CCNC: 17 U/L (ref 15–37)
AST SERPL W P-5'-P-CCNC: 18 U/L (ref 15–37)
AST SERPL W P-5'-P-CCNC: 18 U/L (ref 15–37)
AST SERPL W P-5'-P-CCNC: 19 U/L (ref 15–37)
AST SERPL W P-5'-P-CCNC: 19 U/L (ref 15–37)
AST SERPL W P-5'-P-CCNC: 20 U/L (ref 15–37)
AST SERPL W P-5'-P-CCNC: 21 U/L (ref 15–37)
AST SERPL W P-5'-P-CCNC: 24 U/L (ref 15–37)
AST SERPL W P-5'-P-CCNC: 25 U/L (ref 15–37)
AST SERPL W P-5'-P-CCNC: 28 U/L (ref 15–37)
AST SERPL W P-5'-P-CCNC: 29 U/L (ref 15–37)
AST SERPL W P-5'-P-CCNC: 29 U/L (ref 15–37)
AST SERPL W P-5'-P-CCNC: 30 U/L (ref 15–37)
AST SERPL W P-5'-P-CCNC: 44 U/L (ref 15–37)
AST SERPL W P-5'-P-CCNC: 44 U/L (ref 15–37)
AST SERPL W P-5'-P-CCNC: 48 U/L (ref 15–37)
AST SERPL W P-5'-P-CCNC: 9 U/L (ref 15–37)
AST SERPL-CCNC: 19 U/L (ref 15–37)
AST SERPL-CCNC: 29 U/L (ref 15–37)
AST SERPL-CCNC: 56 U/L (ref 15–37)
ATRIAL RATE: 111 BPM
ATRIAL RATE: 117 BPM
ATRIAL RATE: 120 BPM
BACTERIA SPEC CULT: NORMAL
BACTERIA URNS QL MICRO: ABNORMAL /HPF
BACTERIA URNS QL MICRO: ABNORMAL /HPF
BASOPHILS # BLD AUTO: 0 K/UL (ref 0–0.2)
BASOPHILS # BLD: 0 % (ref 0–2)
BASOPHILS # BLD: 0 K/UL (ref 0–0.2)
BASOPHILS # BLD: 1 % (ref 0–2)
BASOPHILS NFR BLD: 0 % (ref 0–2)
BILIRUB SERPL-MCNC: 0.2 MG/DL (ref 0.2–1.1)
BILIRUB SERPL-MCNC: 0.3 MG/DL (ref 0.2–1.1)
BILIRUB SERPL-MCNC: 0.4 MG/DL (ref 0.2–1.1)
BILIRUB SERPL-MCNC: 0.5 MG/DL (ref 0.2–1.1)
BILIRUB UR QL: ABNORMAL
BILIRUB UR QL: ABNORMAL
BLD PROD TYP BPU: NORMAL
BLOOD BANK CMNT PATIENT-IMP: NORMAL
BLOOD GROUP ANTIBODIES SERPL: NORMAL
BPU ID: NORMAL
BUN SERPL-MCNC: 10 MG/DL (ref 6–23)
BUN SERPL-MCNC: 11 MG/DL (ref 6–23)
BUN SERPL-MCNC: 12 MG/DL (ref 6–23)
BUN SERPL-MCNC: 13 MG/DL (ref 6–23)
BUN SERPL-MCNC: 14 MG/DL (ref 6–23)
BUN SERPL-MCNC: 15 MG/DL (ref 6–23)
BUN SERPL-MCNC: 6 MG/DL (ref 6–23)
BUN SERPL-MCNC: 7 MG/DL (ref 6–23)
BUN SERPL-MCNC: 8 MG/DL (ref 6–23)
BUN SERPL-MCNC: 9 MG/DL (ref 6–23)
CALCIUM SERPL-MCNC: 10.1 MG/DL (ref 8.3–10.4)
CALCIUM SERPL-MCNC: 10.2 MG/DL (ref 8.3–10.4)
CALCIUM SERPL-MCNC: 10.4 MG/DL (ref 8.3–10.4)
CALCIUM SERPL-MCNC: 8.6 MG/DL (ref 8.3–10.4)
CALCIUM SERPL-MCNC: 8.8 MG/DL (ref 8.3–10.4)
CALCIUM SERPL-MCNC: 8.9 MG/DL (ref 8.3–10.4)
CALCIUM SERPL-MCNC: 9 MG/DL (ref 8.3–10.4)
CALCIUM SERPL-MCNC: 9.2 MG/DL (ref 8.3–10.4)
CALCIUM SERPL-MCNC: 9.3 MG/DL (ref 8.3–10.4)
CALCIUM SERPL-MCNC: 9.4 MG/DL (ref 8.3–10.4)
CALCIUM SERPL-MCNC: 9.5 MG/DL (ref 8.3–10.4)
CALCIUM SERPL-MCNC: 9.6 MG/DL (ref 8.3–10.4)
CALCIUM SERPL-MCNC: 9.7 MG/DL (ref 8.3–10.4)
CALCIUM SERPL-MCNC: 9.8 MG/DL (ref 8.3–10.4)
CALCULATED P AXIS, ECG09: 32 DEGREES
CALCULATED P AXIS, ECG09: 47 DEGREES
CALCULATED P AXIS, ECG09: 62 DEGREES
CALCULATED R AXIS, ECG10: 30 DEGREES
CALCULATED R AXIS, ECG10: 44 DEGREES
CALCULATED R AXIS, ECG10: 64 DEGREES
CALCULATED T AXIS, ECG11: 20 DEGREES
CALCULATED T AXIS, ECG11: 31 DEGREES
CALCULATED T AXIS, ECG11: 43 DEGREES
CASTS URNS QL MICRO: 0 /LPF
CASTS URNS QL MICRO: ABNORMAL /LPF
CEA SERPL-MCNC: 114.7 NG/ML (ref 0–3)
CEA SERPL-MCNC: 124.6 NG/ML (ref 0–3)
CEA SERPL-MCNC: 41 NG/ML (ref 0–3)
CEA SERPL-MCNC: 43.6 NG/ML (ref 0–3)
CEA SERPL-MCNC: 44.7 NG/ML (ref 0–3)
CEA SERPL-MCNC: 48.6 NG/ML (ref 0–3)
CEA SERPL-MCNC: 66.3 NG/ML (ref 0–3)
CEA SERPL-MCNC: 88.1 NG/ML (ref 0–3)
CEA SERPL-MCNC: 96.4 NG/ML (ref 0–3)
CHLORIDE SERPL-SCNC: 100 MMOL/L (ref 98–107)
CHLORIDE SERPL-SCNC: 100 MMOL/L (ref 98–107)
CHLORIDE SERPL-SCNC: 101 MMOL/L (ref 98–107)
CHLORIDE SERPL-SCNC: 102 MMOL/L (ref 98–107)
CHLORIDE SERPL-SCNC: 103 MMOL/L (ref 98–107)
CHLORIDE SERPL-SCNC: 104 MMOL/L (ref 98–107)
CHLORIDE SERPL-SCNC: 105 MMOL/L (ref 98–107)
CHLORIDE SERPL-SCNC: 107 MMOL/L (ref 98–107)
CHLORIDE SERPL-SCNC: 108 MMOL/L (ref 98–107)
CHLORIDE SERPL-SCNC: 108 MMOL/L (ref 98–107)
CHLORIDE SERPL-SCNC: 95 MMOL/L (ref 98–107)
CHLORIDE SERPL-SCNC: 96 MMOL/L (ref 98–107)
CHLORIDE SERPL-SCNC: 96 MMOL/L (ref 98–107)
CHLORIDE SERPL-SCNC: 97 MMOL/L (ref 98–107)
CHLORIDE SERPL-SCNC: 98 MMOL/L (ref 98–107)
CHLORIDE SERPL-SCNC: 99 MMOL/L (ref 98–107)
CO2 SERPL-SCNC: 24 MMOL/L (ref 21–32)
CO2 SERPL-SCNC: 26 MMOL/L (ref 21–32)
CO2 SERPL-SCNC: 26 MMOL/L (ref 21–32)
CO2 SERPL-SCNC: 27 MMOL/L (ref 21–32)
CO2 SERPL-SCNC: 28 MMOL/L (ref 21–32)
CO2 SERPL-SCNC: 28 MMOL/L (ref 23–32)
CO2 SERPL-SCNC: 29 MMOL/L (ref 21–32)
CO2 SERPL-SCNC: 29 MMOL/L (ref 23–32)
CO2 SERPL-SCNC: 29 MMOL/L (ref 23–32)
CO2 SERPL-SCNC: 30 MMOL/L (ref 21–32)
CO2 SERPL-SCNC: 30 MMOL/L (ref 23–32)
CO2 SERPL-SCNC: 30 MMOL/L (ref 23–32)
CO2 SERPL-SCNC: 31 MMOL/L (ref 21–32)
CO2 SERPL-SCNC: 32 MMOL/L (ref 21–32)
CO2 SERPL-SCNC: 32 MMOL/L (ref 21–32)
CO2 SERPL-SCNC: 32 MMOL/L (ref 23–32)
CO2 SERPL-SCNC: 33 MMOL/L (ref 21–32)
CO2 SERPL-SCNC: 34 MMOL/L (ref 21–32)
CO2 SERPL-SCNC: 34 MMOL/L (ref 21–32)
COLOR UR: YELLOW
COLOR UR: YELLOW
CREAT SERPL-MCNC: 0.38 MG/DL (ref 0.6–1)
CREAT SERPL-MCNC: 0.42 MG/DL (ref 0.6–1)
CREAT SERPL-MCNC: 0.45 MG/DL (ref 0.6–1)
CREAT SERPL-MCNC: 0.46 MG/DL (ref 0.6–1)
CREAT SERPL-MCNC: 0.46 MG/DL (ref 0.6–1)
CREAT SERPL-MCNC: 0.47 MG/DL (ref 0.6–1)
CREAT SERPL-MCNC: 0.49 MG/DL (ref 0.6–1)
CREAT SERPL-MCNC: 0.49 MG/DL (ref 0.6–1)
CREAT SERPL-MCNC: 0.51 MG/DL (ref 0.6–1)
CREAT SERPL-MCNC: 0.51 MG/DL (ref 0.6–1)
CREAT SERPL-MCNC: 0.52 MG/DL (ref 0.6–1)
CREAT SERPL-MCNC: 0.54 MG/DL (ref 0.6–1)
CREAT SERPL-MCNC: 0.57 MG/DL (ref 0.6–1)
CREAT SERPL-MCNC: 0.59 MG/DL (ref 0.6–1)
CREAT SERPL-MCNC: 0.61 MG/DL (ref 0.6–1)
CREAT SERPL-MCNC: 0.61 MG/DL (ref 0.6–1)
CREAT SERPL-MCNC: 0.62 MG/DL (ref 0.6–1)
CREAT SERPL-MCNC: 0.62 MG/DL (ref 0.6–1)
CREAT SERPL-MCNC: 0.69 MG/DL (ref 0.6–1)
CREAT SERPL-MCNC: 0.7 MG/DL (ref 0.6–1)
CREAT SERPL-MCNC: 0.73 MG/DL (ref 0.6–1)
CREAT SERPL-MCNC: 0.75 MG/DL (ref 0.6–1)
CREAT SERPL-MCNC: 0.75 MG/DL (ref 0.6–1)
CREAT SERPL-MCNC: 0.77 MG/DL (ref 0.6–1)
CREAT SERPL-MCNC: 0.83 MG/DL (ref 0.6–1)
CREAT SERPL-MCNC: 0.84 MG/DL (ref 0.6–1)
CREAT SERPL-MCNC: 0.85 MG/DL (ref 0.6–1)
CREAT SERPL-MCNC: 0.86 MG/DL (ref 0.6–1)
CREAT SERPL-MCNC: 0.86 MG/DL (ref 0.6–1)
CREAT SERPL-MCNC: 0.87 MG/DL (ref 0.6–1)
CREAT SERPL-MCNC: 0.89 MG/DL (ref 0.6–1)
CREAT SERPL-MCNC: 0.95 MG/DL (ref 0.6–1)
CREAT SERPL-MCNC: 1.21 MG/DL (ref 0.6–1)
CROSSMATCH RESULT,%XM: NORMAL
CRYSTALS URNS QL MICRO: 0 /LPF
CRYSTALS URNS QL MICRO: 0 /LPF
DIAGNOSIS, 93000: NORMAL
DIFFERENTIAL METHOD BLD: ABNORMAL
EOSINOPHIL # BLD: 0 K/UL (ref 0–0.8)
EOSINOPHIL # BLD: 0.1 K/UL (ref 0–0.8)
EOSINOPHIL # BLD: 0.2 K/UL (ref 0–0.8)
EOSINOPHIL # BLD: 0.3 K/UL (ref 0–0.8)
EOSINOPHIL # BLD: 0.4 K/UL (ref 0–0.8)
EOSINOPHIL NFR BLD: 0 % (ref 0.5–7.8)
EOSINOPHIL NFR BLD: 0 % (ref 0.5–7.8)
EOSINOPHIL NFR BLD: 1 % (ref 0.5–7.8)
EOSINOPHIL NFR BLD: 2 % (ref 0.5–7.8)
EOSINOPHIL NFR BLD: 3 % (ref 0.5–7.8)
EOSINOPHIL NFR BLD: 4 % (ref 0.5–7.8)
EOSINOPHIL NFR BLD: 4 % (ref 0.5–7.8)
EOSINOPHIL NFR BLD: 5 % (ref 0.5–7.8)
EOSINOPHIL NFR BLD: 6 % (ref 0.5–7.8)
EOSINOPHIL NFR BLD: 7 % (ref 0.5–7.8)
EOSINOPHIL NFR BLD: 8 % (ref 0.5–7.8)
EPI CELLS #/AREA URNS HPF: 0 /HPF
EPI CELLS #/AREA URNS HPF: ABNORMAL /HPF
ERYTHROCYTE [DISTWIDTH] IN BLOOD BY AUTOMATED COUNT: 18.6 % (ref 11.9–14.6)
ERYTHROCYTE [DISTWIDTH] IN BLOOD BY AUTOMATED COUNT: 18.8 % (ref 11.9–14.6)
ERYTHROCYTE [DISTWIDTH] IN BLOOD BY AUTOMATED COUNT: 18.9 % (ref 11.9–14.6)
ERYTHROCYTE [DISTWIDTH] IN BLOOD BY AUTOMATED COUNT: 19 % (ref 11.9–14.6)
ERYTHROCYTE [DISTWIDTH] IN BLOOD BY AUTOMATED COUNT: 19.1 % (ref 11.9–14.6)
ERYTHROCYTE [DISTWIDTH] IN BLOOD BY AUTOMATED COUNT: 19.1 % (ref 11.9–14.6)
ERYTHROCYTE [DISTWIDTH] IN BLOOD BY AUTOMATED COUNT: 19.2 % (ref 11.9–14.6)
ERYTHROCYTE [DISTWIDTH] IN BLOOD BY AUTOMATED COUNT: 19.4 % (ref 11.9–14.6)
ERYTHROCYTE [DISTWIDTH] IN BLOOD BY AUTOMATED COUNT: 19.5 % (ref 11.9–14.6)
ERYTHROCYTE [DISTWIDTH] IN BLOOD BY AUTOMATED COUNT: 19.6 % (ref 11.9–14.6)
ERYTHROCYTE [DISTWIDTH] IN BLOOD BY AUTOMATED COUNT: 19.7 % (ref 11.9–14.6)
ERYTHROCYTE [DISTWIDTH] IN BLOOD BY AUTOMATED COUNT: 19.7 % (ref 11.9–14.6)
ERYTHROCYTE [DISTWIDTH] IN BLOOD BY AUTOMATED COUNT: 19.8 % (ref 11.9–14.6)
ERYTHROCYTE [DISTWIDTH] IN BLOOD BY AUTOMATED COUNT: 19.8 % (ref 11.9–14.6)
ERYTHROCYTE [DISTWIDTH] IN BLOOD BY AUTOMATED COUNT: 19.9 % (ref 11.9–14.6)
ERYTHROCYTE [DISTWIDTH] IN BLOOD BY AUTOMATED COUNT: 20 % (ref 11.9–14.6)
ERYTHROCYTE [DISTWIDTH] IN BLOOD BY AUTOMATED COUNT: 20.1 % (ref 11.9–14.6)
ERYTHROCYTE [DISTWIDTH] IN BLOOD BY AUTOMATED COUNT: 20.3 % (ref 11.9–14.6)
ERYTHROCYTE [DISTWIDTH] IN BLOOD BY AUTOMATED COUNT: 20.5 % (ref 11.9–14.6)
ERYTHROCYTE [DISTWIDTH] IN BLOOD BY AUTOMATED COUNT: 20.5 % (ref 11.9–14.6)
ERYTHROCYTE [DISTWIDTH] IN BLOOD BY AUTOMATED COUNT: 20.6 % (ref 11.9–14.6)
ERYTHROCYTE [DISTWIDTH] IN BLOOD BY AUTOMATED COUNT: 20.7 % (ref 11.9–14.6)
ERYTHROCYTE [DISTWIDTH] IN BLOOD BY AUTOMATED COUNT: 20.7 % (ref 11.9–14.6)
ERYTHROCYTE [DISTWIDTH] IN BLOOD BY AUTOMATED COUNT: 20.8 % (ref 11.9–14.6)
ERYTHROCYTE [DISTWIDTH] IN BLOOD BY AUTOMATED COUNT: 21.1 % (ref 11.9–14.6)
ERYTHROCYTE [DISTWIDTH] IN BLOOD BY AUTOMATED COUNT: 21.3 % (ref 11.9–14.6)
FERRITIN SERPL-MCNC: 75 NG/ML (ref 8–388)
FLUAV AG NPH QL IA: NEGATIVE
FLUAV AG NPH QL IA: NEGATIVE
FLUBV AG NPH QL IA: NEGATIVE
FLUBV AG NPH QL IA: NEGATIVE
GLOBULIN SER CALC-MCNC: 4.2 G/DL (ref 2.3–3.5)
GLOBULIN SER CALC-MCNC: 4.3 G/DL (ref 2.3–3.5)
GLOBULIN SER CALC-MCNC: 4.3 G/DL (ref 2.3–3.5)
GLOBULIN SER CALC-MCNC: 4.4 G/DL (ref 2.3–3.5)
GLOBULIN SER CALC-MCNC: 4.5 G/DL (ref 2.3–3.5)
GLOBULIN SER CALC-MCNC: 4.6 G/DL (ref 2.3–3.5)
GLOBULIN SER CALC-MCNC: 4.6 G/DL (ref 2.3–3.5)
GLOBULIN SER CALC-MCNC: 4.7 G/DL (ref 2.3–3.5)
GLOBULIN SER CALC-MCNC: 4.8 G/DL (ref 2.3–3.5)
GLOBULIN SER CALC-MCNC: 4.9 G/DL (ref 2.3–3.5)
GLOBULIN SER CALC-MCNC: 5 G/DL (ref 2.3–3.5)
GLOBULIN SER CALC-MCNC: 5.2 G/DL (ref 2.3–3.5)
GLOBULIN SER CALC-MCNC: 5.3 G/DL (ref 2.3–3.5)
GLOBULIN SER CALC-MCNC: 5.3 G/DL (ref 2.3–3.5)
GLOBULIN SER CALC-MCNC: 5.5 G/DL (ref 2.3–3.5)
GLUCOSE BLD STRIP.AUTO-MCNC: 117 MG/DL (ref 65–100)
GLUCOSE BLD STRIP.AUTO-MCNC: 117 MG/DL (ref 65–100)
GLUCOSE BLD STRIP.AUTO-MCNC: 136 MG/DL (ref 65–100)
GLUCOSE BLD STRIP.AUTO-MCNC: 146 MG/DL (ref 65–100)
GLUCOSE BLD STRIP.AUTO-MCNC: 155 MG/DL (ref 65–100)
GLUCOSE BLD STRIP.AUTO-MCNC: 155 MG/DL (ref 65–100)
GLUCOSE BLD STRIP.AUTO-MCNC: 156 MG/DL (ref 65–100)
GLUCOSE BLD STRIP.AUTO-MCNC: 156 MG/DL (ref 65–100)
GLUCOSE BLD STRIP.AUTO-MCNC: 157 MG/DL (ref 65–100)
GLUCOSE BLD STRIP.AUTO-MCNC: 163 MG/DL (ref 65–100)
GLUCOSE BLD STRIP.AUTO-MCNC: 165 MG/DL (ref 65–100)
GLUCOSE BLD STRIP.AUTO-MCNC: 175 MG/DL (ref 65–100)
GLUCOSE BLD STRIP.AUTO-MCNC: 176 MG/DL (ref 65–100)
GLUCOSE BLD STRIP.AUTO-MCNC: 187 MG/DL (ref 65–100)
GLUCOSE SERPL-MCNC: 100 MG/DL (ref 65–100)
GLUCOSE SERPL-MCNC: 104 MG/DL (ref 65–100)
GLUCOSE SERPL-MCNC: 105 MG/DL (ref 65–100)
GLUCOSE SERPL-MCNC: 105 MG/DL (ref 65–100)
GLUCOSE SERPL-MCNC: 106 MG/DL (ref 65–100)
GLUCOSE SERPL-MCNC: 107 MG/DL (ref 65–100)
GLUCOSE SERPL-MCNC: 108 MG/DL (ref 65–100)
GLUCOSE SERPL-MCNC: 109 MG/DL (ref 65–100)
GLUCOSE SERPL-MCNC: 113 MG/DL (ref 65–100)
GLUCOSE SERPL-MCNC: 114 MG/DL (ref 65–100)
GLUCOSE SERPL-MCNC: 115 MG/DL (ref 65–100)
GLUCOSE SERPL-MCNC: 116 MG/DL (ref 65–100)
GLUCOSE SERPL-MCNC: 117 MG/DL (ref 65–100)
GLUCOSE SERPL-MCNC: 118 MG/DL (ref 65–100)
GLUCOSE SERPL-MCNC: 118 MG/DL (ref 65–100)
GLUCOSE SERPL-MCNC: 120 MG/DL (ref 65–100)
GLUCOSE SERPL-MCNC: 121 MG/DL (ref 65–100)
GLUCOSE SERPL-MCNC: 124 MG/DL (ref 65–100)
GLUCOSE SERPL-MCNC: 134 MG/DL (ref 65–100)
GLUCOSE SERPL-MCNC: 135 MG/DL (ref 65–100)
GLUCOSE SERPL-MCNC: 138 MG/DL (ref 65–100)
GLUCOSE SERPL-MCNC: 142 MG/DL (ref 65–100)
GLUCOSE SERPL-MCNC: 80 MG/DL (ref 65–100)
GLUCOSE SERPL-MCNC: 81 MG/DL (ref 65–100)
GLUCOSE SERPL-MCNC: 85 MG/DL (ref 65–100)
GLUCOSE SERPL-MCNC: 87 MG/DL (ref 65–100)
GLUCOSE SERPL-MCNC: 90 MG/DL (ref 65–100)
GLUCOSE SERPL-MCNC: 90 MG/DL (ref 65–100)
GLUCOSE SERPL-MCNC: 92 MG/DL (ref 65–100)
GLUCOSE SERPL-MCNC: 93 MG/DL (ref 65–100)
GLUCOSE SERPL-MCNC: 93 MG/DL (ref 65–100)
GLUCOSE SERPL-MCNC: 94 MG/DL (ref 65–100)
GLUCOSE SERPL-MCNC: 94 MG/DL (ref 65–100)
GLUCOSE SERPL-MCNC: 95 MG/DL (ref 65–100)
GLUCOSE SERPL-MCNC: 97 MG/DL (ref 65–100)
GLUCOSE SERPL-MCNC: 98 MG/DL (ref 65–100)
GLUCOSE SERPL-MCNC: 99 MG/DL (ref 65–100)
GLUCOSE UR STRIP.AUTO-MCNC: 100 MG/DL
GLUCOSE UR STRIP.AUTO-MCNC: NEGATIVE MG/DL
HCT VFR BLD AUTO: 22.6 % (ref 35.8–46.3)
HCT VFR BLD AUTO: 23.3 % (ref 35.8–46.3)
HCT VFR BLD AUTO: 23.4 % (ref 35.8–46.3)
HCT VFR BLD AUTO: 24.7 % (ref 35.8–46.3)
HCT VFR BLD AUTO: 25.6 % (ref 35.8–46.3)
HCT VFR BLD AUTO: 25.7 % (ref 35.8–46.3)
HCT VFR BLD AUTO: 25.9 % (ref 35.8–46.3)
HCT VFR BLD AUTO: 26.1 % (ref 35.8–46.3)
HCT VFR BLD AUTO: 26.1 % (ref 35.8–46.3)
HCT VFR BLD AUTO: 26.4 % (ref 35.8–46.3)
HCT VFR BLD AUTO: 26.5 % (ref 35.8–46.3)
HCT VFR BLD AUTO: 27.1 % (ref 35.8–46.3)
HCT VFR BLD AUTO: 27.3 % (ref 35.8–46.3)
HCT VFR BLD AUTO: 27.8 % (ref 35.8–46.3)
HCT VFR BLD AUTO: 27.9 % (ref 35.8–46.3)
HCT VFR BLD AUTO: 27.9 % (ref 35.8–46.3)
HCT VFR BLD AUTO: 28.1 % (ref 35.8–46.3)
HCT VFR BLD AUTO: 28.1 % (ref 35.8–46.3)
HCT VFR BLD AUTO: 28.3 % (ref 35.8–46.3)
HCT VFR BLD AUTO: 28.4 % (ref 35.8–46.3)
HCT VFR BLD AUTO: 28.6 % (ref 35.8–46.3)
HCT VFR BLD AUTO: 28.8 % (ref 35.8–46.3)
HCT VFR BLD AUTO: 29.2 % (ref 35.8–46.3)
HCT VFR BLD AUTO: 29.2 % (ref 35.8–46.3)
HCT VFR BLD AUTO: 29.4 % (ref 35.8–46.3)
HCT VFR BLD AUTO: 29.4 % (ref 35.8–46.3)
HCT VFR BLD AUTO: 29.7 % (ref 35.8–46.3)
HCT VFR BLD AUTO: 32.3 % (ref 35.8–46.3)
HCT VFR BLD AUTO: 33 % (ref 35.8–46.3)
HCT VFR BLD AUTO: 33.2 % (ref 35.8–46.3)
HCT VFR BLD AUTO: 33.2 % (ref 35.8–46.3)
HCT VFR BLD AUTO: 33.3 % (ref 35.8–46.3)
HCT VFR BLD AUTO: 34.6 % (ref 35.8–46.3)
HCT VFR BLD AUTO: 35.8 % (ref 35.8–46.3)
HCT VFR BLD AUTO: 41.5 % (ref 35.8–46.3)
HGB BLD-MCNC: 10.3 G/DL (ref 11.7–15.4)
HGB BLD-MCNC: 10.6 G/DL (ref 11.7–15.4)
HGB BLD-MCNC: 10.7 G/DL (ref 11.7–15.4)
HGB BLD-MCNC: 10.7 G/DL (ref 11.7–15.4)
HGB BLD-MCNC: 10.8 G/DL (ref 11.7–15.4)
HGB BLD-MCNC: 11.2 G/DL (ref 11.7–15.4)
HGB BLD-MCNC: 11.5 G/DL (ref 11.7–15.4)
HGB BLD-MCNC: 11.9 G/DL (ref 11.7–15.4)
HGB BLD-MCNC: 13.3 G/DL (ref 11.7–15.4)
HGB BLD-MCNC: 6.8 G/DL (ref 11.7–15.4)
HGB BLD-MCNC: 6.8 G/DL (ref 11.7–15.4)
HGB BLD-MCNC: 6.9 G/DL (ref 11.7–15.4)
HGB BLD-MCNC: 7.2 G/DL (ref 11.7–15.4)
HGB BLD-MCNC: 7.5 G/DL (ref 11.7–15.4)
HGB BLD-MCNC: 7.6 G/DL (ref 11.7–15.4)
HGB BLD-MCNC: 7.6 G/DL (ref 11.7–15.4)
HGB BLD-MCNC: 7.7 G/DL (ref 11.7–15.4)
HGB BLD-MCNC: 7.8 G/DL (ref 11.7–15.4)
HGB BLD-MCNC: 8 G/DL (ref 11.7–15.4)
HGB BLD-MCNC: 8.1 G/DL (ref 11.7–15.4)
HGB BLD-MCNC: 8.2 G/DL (ref 11.7–15.4)
HGB BLD-MCNC: 8.3 G/DL (ref 11.7–15.4)
HGB BLD-MCNC: 8.3 G/DL (ref 11.7–15.4)
HGB BLD-MCNC: 8.4 G/DL (ref 11.7–15.4)
HGB BLD-MCNC: 8.4 G/DL (ref 11.7–15.4)
HGB BLD-MCNC: 8.5 G/DL (ref 11.7–15.4)
HGB BLD-MCNC: 8.6 G/DL (ref 11.7–15.4)
HGB BLD-MCNC: 8.7 G/DL (ref 11.7–15.4)
HGB BLD-MCNC: 8.9 G/DL (ref 11.7–15.4)
HGB BLD-MCNC: 9.1 G/DL (ref 11.7–15.4)
HGB UR QL STRIP: ABNORMAL
HGB UR QL STRIP: ABNORMAL
IMM GRANULOCYTES # BLD: 0 K/UL (ref 0–0.5)
IMM GRANULOCYTES # BLD: 0.1 K/UL (ref 0–0.5)
IMM GRANULOCYTES NFR BLD AUTO: 0.2 % (ref 0–5)
IMM GRANULOCYTES NFR BLD AUTO: 0.3 % (ref 0–5)
IMM GRANULOCYTES NFR BLD AUTO: 0.7 % (ref 0–5)
IMM GRANULOCYTES NFR BLD AUTO: 0.7 % (ref 0–5)
IMM GRANULOCYTES NFR BLD AUTO: 1 % (ref 0–5)
IMM GRANULOCYTES NFR BLD AUTO: 1 % (ref 0–5)
IMM GRANULOCYTES NFR BLD AUTO: 1.2 % (ref 0–5)
IMM GRANULOCYTES NFR BLD: 0.1 % (ref 0–5)
IMM GRANULOCYTES NFR BLD: 0.2 % (ref 0–5)
IMM GRANULOCYTES NFR BLD: 0.3 % (ref 0–5)
IMM GRANULOCYTES NFR BLD: 0.4 % (ref 0–5)
IMM GRANULOCYTES NFR BLD: 0.4 % (ref 0–5)
IMM GRANULOCYTES NFR BLD: 0.5 % (ref 0–5)
IMM GRANULOCYTES NFR BLD: 0.6 % (ref 0–5)
IMM GRANULOCYTES NFR BLD: 0.7 % (ref 0–5)
IMM GRANULOCYTES NFR BLD: 0.7 % (ref 0–5)
INR PPP: 1 (ref 0.9–1.2)
INR PPP: 1.1 (ref 0.9–1.2)
IRON SATN MFR SERPL: 8 %
IRON SERPL-MCNC: 21 UG/DL (ref 35–150)
KETONES UR QL STRIP.AUTO: NEGATIVE MG/DL
KETONES UR QL STRIP.AUTO: NEGATIVE MG/DL
LACTATE BLD-SCNC: 0.6 MMOL/L (ref 0.5–1.9)
LACTATE BLD-SCNC: 0.9 MMOL/L (ref 0.5–1.9)
LACTATE BLD-SCNC: 1.1 MMOL/L (ref 0.5–1.9)
LACTATE BLD-SCNC: 2.4 MMOL/L (ref 0.5–1.9)
LEUKOCYTE ESTERASE UR QL STRIP.AUTO: ABNORMAL
LEUKOCYTE ESTERASE UR QL STRIP.AUTO: ABNORMAL
LIPASE SERPL-CCNC: 32 U/L (ref 73–393)
LYMPHOCYTES # BLD AUTO: 10 % (ref 13–44)
LYMPHOCYTES # BLD AUTO: 12 % (ref 13–44)
LYMPHOCYTES # BLD AUTO: 13 % (ref 13–44)
LYMPHOCYTES # BLD AUTO: 16 % (ref 13–44)
LYMPHOCYTES # BLD AUTO: 18 % (ref 13–44)
LYMPHOCYTES # BLD AUTO: 19 % (ref 13–44)
LYMPHOCYTES # BLD AUTO: 19 % (ref 13–44)
LYMPHOCYTES # BLD AUTO: 20 % (ref 13–44)
LYMPHOCYTES # BLD AUTO: 21 % (ref 13–44)
LYMPHOCYTES # BLD AUTO: 22 % (ref 13–44)
LYMPHOCYTES # BLD AUTO: 24 % (ref 13–44)
LYMPHOCYTES # BLD AUTO: 25 % (ref 13–44)
LYMPHOCYTES # BLD AUTO: 30 % (ref 13–44)
LYMPHOCYTES # BLD AUTO: 36 % (ref 13–44)
LYMPHOCYTES # BLD AUTO: 4 % (ref 13–44)
LYMPHOCYTES # BLD AUTO: 7 % (ref 13–44)
LYMPHOCYTES # BLD AUTO: 8 % (ref 13–44)
LYMPHOCYTES # BLD AUTO: 9 % (ref 13–44)
LYMPHOCYTES # BLD AUTO: 9 % (ref 13–44)
LYMPHOCYTES # BLD: 0.3 K/UL (ref 0.5–4.6)
LYMPHOCYTES # BLD: 0.4 K/UL (ref 0.5–4.6)
LYMPHOCYTES # BLD: 0.5 K/UL (ref 0.5–4.6)
LYMPHOCYTES # BLD: 0.6 K/UL (ref 0.5–4.6)
LYMPHOCYTES # BLD: 0.7 K/UL (ref 0.5–4.6)
LYMPHOCYTES # BLD: 0.8 K/UL (ref 0.5–4.6)
LYMPHOCYTES # BLD: 0.9 K/UL (ref 0.5–4.6)
LYMPHOCYTES # BLD: 1.1 K/UL (ref 0.5–4.6)
LYMPHOCYTES # BLD: 1.2 K/UL (ref 0.5–4.6)
LYMPHOCYTES NFR BLD MANUAL: 6 % (ref 16–44)
LYMPHOCYTES NFR BLD: 10 % (ref 13–44)
LYMPHOCYTES NFR BLD: 11 % (ref 13–44)
LYMPHOCYTES NFR BLD: 3 % (ref 13–44)
LYMPHOCYTES NFR BLD: 5 % (ref 13–44)
LYMPHOCYTES NFR BLD: 6 % (ref 13–44)
LYMPHOCYTES NFR BLD: 6 % (ref 13–44)
LYMPHOCYTES NFR BLD: 7 % (ref 13–44)
LYMPHOCYTES NFR BLD: 8 % (ref 13–44)
LYMPHOCYTES NFR BLD: 8 % (ref 13–44)
MAGNESIUM SERPL-MCNC: 1.7 MG/DL (ref 1.8–2.4)
MAGNESIUM SERPL-MCNC: 1.8 MG/DL (ref 1.8–2.4)
MAGNESIUM SERPL-MCNC: 1.9 MG/DL (ref 1.8–2.4)
MAGNESIUM SERPL-MCNC: 2 MG/DL (ref 1.8–2.4)
MAGNESIUM SERPL-MCNC: 2.1 MG/DL (ref 1.8–2.4)
MAGNESIUM SERPL-MCNC: 2.3 MG/DL (ref 1.8–2.4)
MCH RBC QN AUTO: 22.3 PG (ref 26.1–32.9)
MCH RBC QN AUTO: 22.4 PG (ref 26.1–32.9)
MCH RBC QN AUTO: 22.6 PG (ref 26.1–32.9)
MCH RBC QN AUTO: 22.6 PG (ref 26.1–32.9)
MCH RBC QN AUTO: 22.8 PG (ref 26.1–32.9)
MCH RBC QN AUTO: 22.9 PG (ref 26.1–32.9)
MCH RBC QN AUTO: 23 PG (ref 26.1–32.9)
MCH RBC QN AUTO: 23 PG (ref 26.1–32.9)
MCH RBC QN AUTO: 23.1 PG (ref 26.1–32.9)
MCH RBC QN AUTO: 23.2 PG (ref 26.1–32.9)
MCH RBC QN AUTO: 23.3 PG (ref 26.1–32.9)
MCH RBC QN AUTO: 23.4 PG (ref 26.1–32.9)
MCH RBC QN AUTO: 23.4 PG (ref 26.1–32.9)
MCH RBC QN AUTO: 23.6 PG (ref 26.1–32.9)
MCH RBC QN AUTO: 23.7 PG (ref 26.1–32.9)
MCH RBC QN AUTO: 23.8 PG (ref 26.1–32.9)
MCH RBC QN AUTO: 23.8 PG (ref 26.1–32.9)
MCH RBC QN AUTO: 24.1 PG (ref 26.1–32.9)
MCH RBC QN AUTO: 24.5 PG (ref 26.1–32.9)
MCH RBC QN AUTO: 25.4 PG (ref 26.1–32.9)
MCH RBC QN AUTO: 25.5 PG (ref 26.1–32.9)
MCH RBC QN AUTO: 25.6 PG (ref 26.1–32.9)
MCH RBC QN AUTO: 25.6 PG (ref 26.1–32.9)
MCH RBC QN AUTO: 25.7 PG (ref 26.1–32.9)
MCH RBC QN AUTO: 25.7 PG (ref 26.1–32.9)
MCH RBC QN AUTO: 26.8 PG (ref 26.1–32.9)
MCHC RBC AUTO-ENTMCNC: 28.7 G/DL (ref 31.4–35)
MCHC RBC AUTO-ENTMCNC: 29.1 G/DL (ref 31.4–35)
MCHC RBC AUTO-ENTMCNC: 29.2 G/DL (ref 31.4–35)
MCHC RBC AUTO-ENTMCNC: 29.2 G/DL (ref 31.4–35)
MCHC RBC AUTO-ENTMCNC: 29.3 G/DL (ref 31.4–35)
MCHC RBC AUTO-ENTMCNC: 29.4 G/DL (ref 31.4–35)
MCHC RBC AUTO-ENTMCNC: 29.4 G/DL (ref 31.4–35)
MCHC RBC AUTO-ENTMCNC: 29.5 G/DL (ref 31.4–35)
MCHC RBC AUTO-ENTMCNC: 29.5 G/DL (ref 31.4–35)
MCHC RBC AUTO-ENTMCNC: 29.6 G/DL (ref 31.4–35)
MCHC RBC AUTO-ENTMCNC: 29.7 G/DL (ref 31.4–35)
MCHC RBC AUTO-ENTMCNC: 29.7 G/DL (ref 31.4–35)
MCHC RBC AUTO-ENTMCNC: 29.9 G/DL (ref 31.4–35)
MCHC RBC AUTO-ENTMCNC: 30 G/DL (ref 31.4–35)
MCHC RBC AUTO-ENTMCNC: 30 G/DL (ref 31.4–35)
MCHC RBC AUTO-ENTMCNC: 30.1 G/DL (ref 31.4–35)
MCHC RBC AUTO-ENTMCNC: 30.1 G/DL (ref 31.4–35)
MCHC RBC AUTO-ENTMCNC: 30.8 G/DL (ref 31.4–35)
MCHC RBC AUTO-ENTMCNC: 31 G/DL (ref 31.4–35)
MCHC RBC AUTO-ENTMCNC: 31.9 G/DL (ref 31.4–35)
MCHC RBC AUTO-ENTMCNC: 31.9 G/DL (ref 31.4–35)
MCHC RBC AUTO-ENTMCNC: 32 G/DL (ref 31.4–35)
MCHC RBC AUTO-ENTMCNC: 32.1 G/DL (ref 31.4–35)
MCHC RBC AUTO-ENTMCNC: 32.1 G/DL (ref 31.4–35)
MCHC RBC AUTO-ENTMCNC: 32.2 G/DL (ref 31.4–35)
MCHC RBC AUTO-ENTMCNC: 32.4 G/DL (ref 31.4–35)
MCHC RBC AUTO-ENTMCNC: 32.7 G/DL (ref 31.4–35)
MCV RBC AUTO: 74.1 FL (ref 79.6–97.8)
MCV RBC AUTO: 75.6 FL (ref 79.6–97.8)
MCV RBC AUTO: 76 FL (ref 79.6–97.8)
MCV RBC AUTO: 76.3 FL (ref 79.6–97.8)
MCV RBC AUTO: 76.8 FL (ref 79.6–97.8)
MCV RBC AUTO: 76.9 FL (ref 79.6–97.8)
MCV RBC AUTO: 77 FL (ref 79.6–97.8)
MCV RBC AUTO: 77 FL (ref 79.6–97.8)
MCV RBC AUTO: 77.1 FL (ref 79.6–97.8)
MCV RBC AUTO: 78.1 FL (ref 79.6–97.8)
MCV RBC AUTO: 78.2 FL (ref 79.6–97.8)
MCV RBC AUTO: 78.3 FL (ref 79.6–97.8)
MCV RBC AUTO: 78.5 FL (ref 79.6–97.8)
MCV RBC AUTO: 78.6 FL (ref 79.6–97.8)
MCV RBC AUTO: 78.7 FL (ref 79.6–97.8)
MCV RBC AUTO: 78.8 FL (ref 79.6–97.8)
MCV RBC AUTO: 78.9 FL (ref 79.6–97.8)
MCV RBC AUTO: 79 FL (ref 79.6–97.8)
MCV RBC AUTO: 79.1 FL (ref 79.6–97.8)
MCV RBC AUTO: 79.2 FL (ref 79.6–97.8)
MCV RBC AUTO: 79.3 FL (ref 79.6–97.8)
MCV RBC AUTO: 79.4 FL (ref 79.6–97.8)
MCV RBC AUTO: 79.6 FL (ref 79.6–97.8)
MCV RBC AUTO: 79.7 FL (ref 79.6–97.8)
MCV RBC AUTO: 79.7 FL (ref 79.6–97.8)
MCV RBC AUTO: 79.8 FL (ref 79.6–97.8)
MCV RBC AUTO: 80 FL (ref 79.6–97.8)
MCV RBC AUTO: 83.5 FL (ref 79.6–97.8)
MM INDURATION POC: 0 MM (ref 0–5)
MONOCYTES # BLD: 0.2 K/UL (ref 0.1–1.3)
MONOCYTES # BLD: 0.3 K/UL (ref 0.1–1.3)
MONOCYTES # BLD: 0.4 K/UL (ref 0.1–1.3)
MONOCYTES # BLD: 0.5 K/UL (ref 0.1–1.3)
MONOCYTES # BLD: 0.6 K/UL (ref 0.1–1.3)
MONOCYTES # BLD: 0.6 K/UL (ref 0.1–1.3)
MONOCYTES # BLD: 0.7 K/UL (ref 0.1–1.3)
MONOCYTES # BLD: 0.7 K/UL (ref 0.1–1.3)
MONOCYTES # BLD: 0.8 K/UL (ref 0.1–1.3)
MONOCYTES NFR BLD AUTO: 10 % (ref 4–12)
MONOCYTES NFR BLD AUTO: 10 % (ref 4–12)
MONOCYTES NFR BLD AUTO: 11 % (ref 4–12)
MONOCYTES NFR BLD AUTO: 12 % (ref 4–12)
MONOCYTES NFR BLD AUTO: 13 % (ref 4–12)
MONOCYTES NFR BLD AUTO: 13 % (ref 4–12)
MONOCYTES NFR BLD AUTO: 14 % (ref 4–12)
MONOCYTES NFR BLD AUTO: 15 % (ref 4–12)
MONOCYTES NFR BLD AUTO: 16 % (ref 4–12)
MONOCYTES NFR BLD AUTO: 23 % (ref 4–12)
MONOCYTES NFR BLD AUTO: 3 % (ref 4–12)
MONOCYTES NFR BLD AUTO: 4 % (ref 4–12)
MONOCYTES NFR BLD AUTO: 5 % (ref 4–12)
MONOCYTES NFR BLD AUTO: 5 % (ref 4–12)
MONOCYTES NFR BLD AUTO: 6 % (ref 4–12)
MONOCYTES NFR BLD AUTO: 7 % (ref 4–12)
MONOCYTES NFR BLD AUTO: 9 % (ref 4–12)
MONOCYTES NFR BLD MANUAL: 2 % (ref 3–9)
MONOCYTES NFR BLD: 1 % (ref 4–12)
MONOCYTES NFR BLD: 2 % (ref 4–12)
MONOCYTES NFR BLD: 5 % (ref 4–12)
MONOCYTES NFR BLD: 6 % (ref 4–12)
MONOCYTES NFR BLD: 7 % (ref 4–12)
MONOCYTES NFR BLD: 7 % (ref 4–12)
MONOCYTES NFR BLD: 8 % (ref 4–12)
MUCOUS THREADS URNS QL MICRO: 0 /LPF
MUCOUS THREADS URNS QL MICRO: ABNORMAL /LPF
MYELOCYTES NFR BLD MANUAL: 2 %
NEUTS BAND NFR BLD MANUAL: 1 % (ref 0–6)
NEUTS SEG # BLD: 0.7 K/UL (ref 1.7–8.2)
NEUTS SEG # BLD: 0.9 K/UL (ref 1.7–8.2)
NEUTS SEG # BLD: 1.4 K/UL (ref 1.7–8.2)
NEUTS SEG # BLD: 1.5 K/UL (ref 1.7–8.2)
NEUTS SEG # BLD: 1.7 K/UL (ref 1.7–8.2)
NEUTS SEG # BLD: 1.8 K/UL (ref 1.7–8.2)
NEUTS SEG # BLD: 10.8 K/UL (ref 1.7–8.2)
NEUTS SEG # BLD: 11.9 K/UL (ref 1.7–8.2)
NEUTS SEG # BLD: 13.5 K/UL (ref 1.7–8.2)
NEUTS SEG # BLD: 2.8 K/UL (ref 1.7–8.2)
NEUTS SEG # BLD: 3 K/UL (ref 1.7–8.2)
NEUTS SEG # BLD: 3.6 K/UL (ref 1.7–8.2)
NEUTS SEG # BLD: 3.8 K/UL (ref 1.7–8.2)
NEUTS SEG # BLD: 3.8 K/UL (ref 1.7–8.2)
NEUTS SEG # BLD: 4.1 K/UL (ref 1.7–8.2)
NEUTS SEG # BLD: 4.3 K/UL (ref 1.7–8.2)
NEUTS SEG # BLD: 4.4 K/UL (ref 1.7–8.2)
NEUTS SEG # BLD: 4.5 K/UL (ref 1.7–8.2)
NEUTS SEG # BLD: 4.5 K/UL (ref 1.7–8.2)
NEUTS SEG # BLD: 5.1 K/UL (ref 1.7–8.2)
NEUTS SEG # BLD: 5.8 K/UL (ref 1.7–8.2)
NEUTS SEG # BLD: 5.8 K/UL (ref 1.7–8.2)
NEUTS SEG # BLD: 6 K/UL (ref 1.7–8.2)
NEUTS SEG # BLD: 6 K/UL (ref 1.7–8.2)
NEUTS SEG # BLD: 6.1 K/UL (ref 1.7–8.2)
NEUTS SEG # BLD: 6.6 K/UL (ref 1.7–8.2)
NEUTS SEG # BLD: 6.9 K/UL (ref 1.7–8.2)
NEUTS SEG # BLD: 7 K/UL (ref 1.7–8.2)
NEUTS SEG # BLD: 7 K/UL (ref 1.7–8.2)
NEUTS SEG # BLD: 7.3 K/UL (ref 1.7–8.2)
NEUTS SEG # BLD: 8.2 K/UL (ref 1.7–8.2)
NEUTS SEG NFR BLD AUTO: 42 % (ref 43–78)
NEUTS SEG NFR BLD AUTO: 49 % (ref 43–78)
NEUTS SEG NFR BLD AUTO: 50 % (ref 43–78)
NEUTS SEG NFR BLD AUTO: 58 % (ref 43–78)
NEUTS SEG NFR BLD AUTO: 62 % (ref 43–78)
NEUTS SEG NFR BLD AUTO: 63 % (ref 43–78)
NEUTS SEG NFR BLD AUTO: 63 % (ref 43–78)
NEUTS SEG NFR BLD AUTO: 64 % (ref 43–78)
NEUTS SEG NFR BLD AUTO: 64 % (ref 43–78)
NEUTS SEG NFR BLD AUTO: 67 % (ref 43–78)
NEUTS SEG NFR BLD AUTO: 71 % (ref 43–78)
NEUTS SEG NFR BLD AUTO: 73 % (ref 43–78)
NEUTS SEG NFR BLD AUTO: 74 % (ref 43–78)
NEUTS SEG NFR BLD AUTO: 77 % (ref 43–78)
NEUTS SEG NFR BLD AUTO: 77 % (ref 43–78)
NEUTS SEG NFR BLD AUTO: 78 % (ref 43–78)
NEUTS SEG NFR BLD AUTO: 79 % (ref 43–78)
NEUTS SEG NFR BLD AUTO: 81 % (ref 43–78)
NEUTS SEG NFR BLD AUTO: 82 % (ref 43–78)
NEUTS SEG NFR BLD AUTO: 86 % (ref 43–78)
NEUTS SEG NFR BLD AUTO: 91 % (ref 43–78)
NEUTS SEG NFR BLD MANUAL: 89 % (ref 47–75)
NEUTS SEG NFR BLD: 81 % (ref 43–78)
NEUTS SEG NFR BLD: 81 % (ref 43–78)
NEUTS SEG NFR BLD: 82 % (ref 43–78)
NEUTS SEG NFR BLD: 83 % (ref 43–78)
NEUTS SEG NFR BLD: 85 % (ref 43–78)
NEUTS SEG NFR BLD: 86 % (ref 43–78)
NEUTS SEG NFR BLD: 88 % (ref 43–78)
NEUTS SEG NFR BLD: 92 % (ref 43–78)
NEUTS SEG NFR BLD: 96 % (ref 43–78)
NITRITE UR QL STRIP.AUTO: NEGATIVE
NITRITE UR QL STRIP.AUTO: POSITIVE
NRBC # BLD: 0 K/UL (ref 0–0.2)
NRBC # BLD: 0.01 K/UL (ref 0–0.2)
NRBC # BLD: 0.01 K/UL (ref 0–0.2)
NRBC # BLD: 0.02 K/UL (ref 0–0.2)
NRBC BLD-RTO: 0 PER 100 WBC (ref 0–2)
OTHER OBSERVATIONS,UCOM: ABNORMAL
P-R INTERVAL, ECG05: 130 MS
P-R INTERVAL, ECG05: 140 MS
P-R INTERVAL, ECG05: 142 MS
PH UR STRIP: 6 [PH] (ref 5–9)
PH UR STRIP: 7.5 [PH] (ref 5–9)
PHOSPHATE SERPL-MCNC: 2.9 MG/DL (ref 2.5–4.5)
PHOSPHATE SERPL-MCNC: 3.4 MG/DL (ref 2.5–4.5)
PHOSPHATE SERPL-MCNC: 3.4 MG/DL (ref 2.5–4.5)
PHOSPHATE SERPL-MCNC: 4.1 MG/DL (ref 2.5–4.5)
PLATELET # BLD AUTO: 114 K/UL (ref 150–450)
PLATELET # BLD AUTO: 143 K/UL (ref 150–450)
PLATELET # BLD AUTO: 149 K/UL (ref 150–450)
PLATELET # BLD AUTO: 158 K/UL (ref 150–450)
PLATELET # BLD AUTO: 161 K/UL (ref 150–450)
PLATELET # BLD AUTO: 198 K/UL (ref 150–450)
PLATELET # BLD AUTO: 217 K/UL (ref 150–450)
PLATELET # BLD AUTO: 218 K/UL (ref 150–450)
PLATELET # BLD AUTO: 222 K/UL (ref 150–450)
PLATELET # BLD AUTO: 229 K/UL (ref 150–450)
PLATELET # BLD AUTO: 235 K/UL (ref 150–450)
PLATELET # BLD AUTO: 238 K/UL (ref 150–450)
PLATELET # BLD AUTO: 248 K/UL (ref 150–450)
PLATELET # BLD AUTO: 251 K/UL (ref 150–450)
PLATELET # BLD AUTO: 253 K/UL (ref 150–450)
PLATELET # BLD AUTO: 261 K/UL (ref 150–450)
PLATELET # BLD AUTO: 264 K/UL (ref 150–450)
PLATELET # BLD AUTO: 276 K/UL (ref 150–450)
PLATELET # BLD AUTO: 280 K/UL (ref 150–450)
PLATELET # BLD AUTO: 291 K/UL (ref 150–450)
PLATELET # BLD AUTO: 295 K/UL (ref 150–450)
PLATELET # BLD AUTO: 295 K/UL (ref 150–450)
PLATELET # BLD AUTO: 296 K/UL (ref 150–450)
PLATELET # BLD AUTO: 296 K/UL (ref 150–450)
PLATELET # BLD AUTO: 304 K/UL (ref 150–450)
PLATELET # BLD AUTO: 306 K/UL (ref 150–450)
PLATELET # BLD AUTO: 320 K/UL (ref 150–450)
PLATELET # BLD AUTO: 327 K/UL (ref 150–450)
PLATELET # BLD AUTO: 328 K/UL (ref 150–450)
PLATELET # BLD AUTO: 337 K/UL (ref 150–450)
PLATELET # BLD AUTO: 342 K/UL (ref 150–450)
PLATELET # BLD AUTO: 345 K/UL (ref 150–450)
PLATELET # BLD AUTO: 345 K/UL (ref 150–450)
PLATELET # BLD AUTO: 348 K/UL (ref 150–450)
PLATELET # BLD AUTO: 351 K/UL (ref 150–450)
PLATELET # BLD AUTO: 371 K/UL (ref 150–450)
PLATELET COMMENTS,PCOM: ADEQUATE
PLATELET COMMENTS,PCOM: ADEQUATE
PMV BLD AUTO: 8.4 FL (ref 10.8–14.1)
PMV BLD AUTO: 8.5 FL (ref 10.8–14.1)
PMV BLD AUTO: 8.7 FL (ref 10.8–14.1)
PMV BLD AUTO: 8.7 FL (ref 10.8–14.1)
PMV BLD AUTO: 8.8 FL (ref 10.8–14.1)
PMV BLD AUTO: 8.9 FL (ref 10.8–14.1)
PMV BLD AUTO: 9 FL (ref 10.8–14.1)
PMV BLD AUTO: 9.1 FL (ref 10.8–14.1)
PMV BLD AUTO: 9.2 FL (ref 10.8–14.1)
PMV BLD AUTO: 9.3 FL (ref 10.8–14.1)
PMV BLD AUTO: 9.3 FL (ref 10.8–14.1)
PMV BLD AUTO: 9.4 FL (ref 10.8–14.1)
PMV BLD AUTO: 9.4 FL (ref 10.8–14.1)
PMV BLD AUTO: 9.5 FL (ref 10.8–14.1)
PMV BLD AUTO: 9.7 FL (ref 10.8–14.1)
PMV BLD AUTO: 9.8 FL (ref 10.8–14.1)
POTASSIUM BLD-SCNC: 3.2 MMOL/L (ref 3.5–5.1)
POTASSIUM SERPL-SCNC: 2.7 MMOL/L (ref 3.5–5.1)
POTASSIUM SERPL-SCNC: 2.8 MMOL/L (ref 3.5–5.1)
POTASSIUM SERPL-SCNC: 2.9 MMOL/L (ref 3.5–5.1)
POTASSIUM SERPL-SCNC: 2.9 MMOL/L (ref 3.5–5.1)
POTASSIUM SERPL-SCNC: 3 MMOL/L (ref 3.5–5.1)
POTASSIUM SERPL-SCNC: 3.1 MMOL/L (ref 3.5–5.1)
POTASSIUM SERPL-SCNC: 3.1 MMOL/L (ref 3.5–5.1)
POTASSIUM SERPL-SCNC: 3.2 MMOL/L (ref 3.5–5.1)
POTASSIUM SERPL-SCNC: 3.3 MMOL/L (ref 3.5–5.1)
POTASSIUM SERPL-SCNC: 3.4 MMOL/L (ref 3.5–5.1)
POTASSIUM SERPL-SCNC: 3.4 MMOL/L (ref 3.5–5.1)
POTASSIUM SERPL-SCNC: 3.5 MMOL/L (ref 3.5–5.1)
POTASSIUM SERPL-SCNC: 3.5 MMOL/L (ref 3.5–5.1)
POTASSIUM SERPL-SCNC: 3.6 MMOL/L (ref 3.5–5.1)
POTASSIUM SERPL-SCNC: 3.6 MMOL/L (ref 3.5–5.1)
POTASSIUM SERPL-SCNC: 3.7 MMOL/L (ref 3.5–5.1)
POTASSIUM SERPL-SCNC: 3.8 MMOL/L (ref 3.5–5.1)
POTASSIUM SERPL-SCNC: 3.9 MMOL/L (ref 3.5–5.1)
POTASSIUM SERPL-SCNC: 4.1 MMOL/L (ref 3.5–5.1)
POTASSIUM SERPL-SCNC: 4.1 MMOL/L (ref 3.5–5.1)
PPD POC: NEGATIVE NEGATIVE
PROCALCITONIN SERPL-MCNC: 0.4 NG/ML
PROCALCITONIN SERPL-MCNC: 0.6 NG/ML
PROT SERPL-MCNC: 6.5 G/DL (ref 6.3–8.2)
PROT SERPL-MCNC: 6.7 G/DL (ref 6.3–8.2)
PROT SERPL-MCNC: 6.7 G/DL (ref 6.3–8.2)
PROT SERPL-MCNC: 6.8 G/DL (ref 6.3–8.2)
PROT SERPL-MCNC: 6.8 G/DL (ref 6.3–8.2)
PROT SERPL-MCNC: 7.1 G/DL (ref 6.3–8.2)
PROT SERPL-MCNC: 7.2 G/DL (ref 6.3–8.2)
PROT SERPL-MCNC: 7.3 G/DL (ref 6.3–8.2)
PROT SERPL-MCNC: 7.3 G/DL (ref 6.3–8.2)
PROT SERPL-MCNC: 7.4 G/DL (ref 6.3–8.2)
PROT SERPL-MCNC: 7.5 G/DL (ref 6.3–8.2)
PROT SERPL-MCNC: 7.6 G/DL (ref 6.3–8.2)
PROT SERPL-MCNC: 7.8 G/DL (ref 6.3–8.2)
PROT SERPL-MCNC: 7.9 G/DL (ref 6.3–8.2)
PROT UR STRIP-MCNC: 100 MG/DL
PROT UR STRIP-MCNC: 100 MG/DL
PROT UR-MCNC: 105 MG/DL
PROT UR-MCNC: 12 MG/DL
PROT UR-MCNC: 126 MG/DL
PROT UR-MCNC: 21 MG/DL
PROT UR-MCNC: 23 MG/DL
PROT UR-MCNC: 29 MG/DL
PROT UR-MCNC: 29 MG/DL
PROT UR-MCNC: 31 MG/DL
PROT UR-MCNC: 34 MG/DL
PROT UR-MCNC: 49 MG/DL
PROTHROMBIN TIME: 11.3 SEC (ref 9.6–12)
PROTHROMBIN TIME: 12 SEC (ref 9.6–12)
Q-T INTERVAL, ECG07: 306 MS
Q-T INTERVAL, ECG07: 318 MS
Q-T INTERVAL, ECG07: 354 MS
QRS DURATION, ECG06: 72 MS
QRS DURATION, ECG06: 76 MS
QRS DURATION, ECG06: 80 MS
QTC CALCULATION (BEZET), ECG08: 426 MS
QTC CALCULATION (BEZET), ECG08: 449 MS
QTC CALCULATION (BEZET), ECG08: 481 MS
RBC # BLD AUTO: 3.03 M/UL (ref 4.05–5.25)
RBC # BLD AUTO: 3.04 M/UL (ref 4.05–5.25)
RBC # BLD AUTO: 3.05 M/UL (ref 4.05–5.25)
RBC # BLD AUTO: 3.1 M/UL (ref 4.05–5.25)
RBC # BLD AUTO: 3.27 M/UL (ref 4.05–5.25)
RBC # BLD AUTO: 3.27 M/UL (ref 4.05–5.25)
RBC # BLD AUTO: 3.29 M/UL (ref 4.05–5.25)
RBC # BLD AUTO: 3.33 M/UL (ref 4.05–5.25)
RBC # BLD AUTO: 3.33 M/UL (ref 4.05–5.25)
RBC # BLD AUTO: 3.39 M/UL (ref 4.05–5.25)
RBC # BLD AUTO: 3.4 M/UL (ref 4.05–5.25)
RBC # BLD AUTO: 3.41 M/UL (ref 4.05–5.25)
RBC # BLD AUTO: 3.42 M/UL (ref 4.05–5.25)
RBC # BLD AUTO: 3.44 M/UL (ref 4.05–5.25)
RBC # BLD AUTO: 3.53 M/UL (ref 4.05–5.25)
RBC # BLD AUTO: 3.55 M/UL (ref 4.05–5.25)
RBC # BLD AUTO: 3.57 M/UL (ref 4.05–5.25)
RBC # BLD AUTO: 3.57 M/UL (ref 4.05–5.25)
RBC # BLD AUTO: 3.59 M/UL (ref 4.05–5.25)
RBC # BLD AUTO: 3.63 M/UL (ref 4.05–5.25)
RBC # BLD AUTO: 3.64 M/UL (ref 4.05–5.25)
RBC # BLD AUTO: 3.71 M/UL (ref 4.05–5.25)
RBC # BLD AUTO: 3.71 M/UL (ref 4.05–5.25)
RBC # BLD AUTO: 3.72 M/UL (ref 4.05–5.25)
RBC # BLD AUTO: 3.75 M/UL (ref 4.05–5.25)
RBC # BLD AUTO: 3.82 M/UL (ref 4.05–5.25)
RBC # BLD AUTO: 4.15 M/UL (ref 4.05–5.25)
RBC # BLD AUTO: 4.17 M/UL (ref 4.05–5.25)
RBC # BLD AUTO: 4.2 M/UL (ref 4.05–5.25)
RBC # BLD AUTO: 4.2 M/UL (ref 4.05–5.25)
RBC # BLD AUTO: 4.22 M/UL (ref 4.05–5.25)
RBC # BLD AUTO: 4.38 M/UL (ref 4.05–5.25)
RBC # BLD AUTO: 4.49 M/UL (ref 4.05–5.25)
RBC # BLD AUTO: 4.97 M/UL (ref 4.05–5.25)
RBC #/AREA URNS HPF: ABNORMAL /HPF
RBC #/AREA URNS HPF: ABNORMAL /HPF
RBC MORPH BLD: ABNORMAL
SERVICE CMNT-IMP: NORMAL
SODIUM SERPL-SCNC: 135 MMOL/L (ref 136–145)
SODIUM SERPL-SCNC: 136 MMOL/L (ref 136–145)
SODIUM SERPL-SCNC: 136 MMOL/L (ref 136–145)
SODIUM SERPL-SCNC: 137 MMOL/L (ref 136–145)
SODIUM SERPL-SCNC: 138 MMOL/L (ref 136–145)
SODIUM SERPL-SCNC: 139 MMOL/L (ref 136–145)
SODIUM SERPL-SCNC: 140 MMOL/L (ref 136–145)
SODIUM SERPL-SCNC: 141 MMOL/L (ref 136–145)
SODIUM SERPL-SCNC: 141 MMOL/L (ref 136–145)
SODIUM SERPL-SCNC: 142 MMOL/L (ref 136–145)
SODIUM SERPL-SCNC: 143 MMOL/L (ref 136–145)
SODIUM SERPL-SCNC: 144 MMOL/L (ref 136–145)
SP GR UR REFRACTOMETRY: 1.02 (ref 1–1.02)
SP GR UR REFRACTOMETRY: 1.02 (ref 1–1.02)
SPECIMEN EXP DATE BLD: NORMAL
STATUS OF UNIT,%ST: NORMAL
TIBC SERPL-MCNC: 251 UG/DL (ref 250–450)
UNIT DIVISION, %UDIV: 0
UROBILINOGEN UR QL STRIP.AUTO: 1 EU/DL (ref 0.2–1)
UROBILINOGEN UR QL STRIP.AUTO: 4 EU/DL (ref 0.2–1)
VANCOMYCIN TROUGH SERPL-MCNC: 19 UG/ML (ref 5–20)
VENTRICULAR RATE, ECG03: 111 BPM
VENTRICULAR RATE, ECG03: 117 BPM
VENTRICULAR RATE, ECG03: 120 BPM
WBC # BLD AUTO: 1.5 K/UL (ref 4.3–11.1)
WBC # BLD AUTO: 1.8 K/UL (ref 4.3–11.1)
WBC # BLD AUTO: 12.4 K/UL (ref 4.3–11.1)
WBC # BLD AUTO: 12.5 K/UL (ref 4.3–11.1)
WBC # BLD AUTO: 14.7 K/UL (ref 4.3–11.1)
WBC # BLD AUTO: 2.3 K/UL (ref 4.3–11.1)
WBC # BLD AUTO: 2.4 K/UL (ref 4.3–11.1)
WBC # BLD AUTO: 2.6 K/UL (ref 4.3–11.1)
WBC # BLD AUTO: 2.7 K/UL (ref 4.3–11.1)
WBC # BLD AUTO: 2.8 K/UL (ref 4.3–11.1)
WBC # BLD AUTO: 3.4 K/UL (ref 4.3–11.1)
WBC # BLD AUTO: 4.4 K/UL (ref 4.3–11.1)
WBC # BLD AUTO: 4.9 K/UL (ref 4.3–11.1)
WBC # BLD AUTO: 5 K/UL (ref 4.3–11.1)
WBC # BLD AUTO: 5.5 K/UL (ref 4.3–11.1)
WBC # BLD AUTO: 5.6 K/UL (ref 4.3–11.1)
WBC # BLD AUTO: 5.6 K/UL (ref 4.3–11.1)
WBC # BLD AUTO: 5.7 K/UL (ref 4.3–11.1)
WBC # BLD AUTO: 5.8 K/UL (ref 4.3–11.1)
WBC # BLD AUTO: 5.8 K/UL (ref 4.3–11.1)
WBC # BLD AUTO: 6.2 K/UL (ref 4.3–11.1)
WBC # BLD AUTO: 6.3 K/UL (ref 4.3–11.1)
WBC # BLD AUTO: 6.6 K/UL (ref 4.3–11.1)
WBC # BLD AUTO: 6.8 K/UL (ref 4.3–11.1)
WBC # BLD AUTO: 7.1 K/UL (ref 4.3–11.1)
WBC # BLD AUTO: 7.3 K/UL (ref 4.3–11.1)
WBC # BLD AUTO: 7.4 K/UL (ref 4.3–11.1)
WBC # BLD AUTO: 7.7 K/UL (ref 4.3–11.1)
WBC # BLD AUTO: 8 K/UL (ref 4.3–11.1)
WBC # BLD AUTO: 8.2 K/UL (ref 4.3–11.1)
WBC # BLD AUTO: 8.4 K/UL (ref 4.3–11.1)
WBC # BLD AUTO: 9.6 K/UL (ref 4.3–11.1)
WBC MORPH BLD: ABNORMAL
WBC MORPH BLD: ABNORMAL
WBC URNS QL MICRO: ABNORMAL /HPF
WBC URNS QL MICRO: ABNORMAL /HPF
YEAST URNS QL MICRO: ABNORMAL

## 2017-01-01 PROCEDURE — 87804 INFLUENZA ASSAY W/OPTIC: CPT | Performed by: INTERNAL MEDICINE

## 2017-01-01 PROCEDURE — 96361 HYDRATE IV INFUSION ADD-ON: CPT

## 2017-01-01 PROCEDURE — P9040 RBC LEUKOREDUCED IRRADIATED: HCPCS | Performed by: INTERNAL MEDICINE

## 2017-01-01 PROCEDURE — 30233N1 TRANSFUSION OF NONAUTOLOGOUS RED BLOOD CELLS INTO PERIPHERAL VEIN, PERCUTANEOUS APPROACH: ICD-10-PCS | Performed by: SURGERY

## 2017-01-01 PROCEDURE — 77295 3-D RADIOTHERAPY PLAN: CPT

## 2017-01-01 PROCEDURE — 36430 TRANSFUSION BLD/BLD COMPNT: CPT

## 2017-01-01 PROCEDURE — 77412 RADIATION TX DELIVERY LVL 3: CPT

## 2017-01-01 PROCEDURE — 80053 COMPREHEN METABOLIC PANEL: CPT | Performed by: EMERGENCY MEDICINE

## 2017-01-01 PROCEDURE — 96360 HYDRATION IV INFUSION INIT: CPT

## 2017-01-01 PROCEDURE — 85025 COMPLETE CBC W/AUTO DIFF WBC: CPT | Performed by: SURGERY

## 2017-01-01 PROCEDURE — 86644 CMV ANTIBODY: CPT | Performed by: SURGERY

## 2017-01-01 PROCEDURE — 74011000258 HC RX REV CODE- 258: Performed by: SURGERY

## 2017-01-01 PROCEDURE — 74011000258 HC RX REV CODE- 258: Performed by: EMERGENCY MEDICINE

## 2017-01-01 PROCEDURE — 74011250636 HC RX REV CODE- 250/636: Performed by: SURGERY

## 2017-01-01 PROCEDURE — 77030010541: Performed by: SURGERY

## 2017-01-01 PROCEDURE — 99285 EMERGENCY DEPT VISIT HI MDM: CPT | Performed by: EMERGENCY MEDICINE

## 2017-01-01 PROCEDURE — 74011250637 HC RX REV CODE- 250/637: Performed by: NURSE PRACTITIONER

## 2017-01-01 PROCEDURE — 96372 THER/PROPH/DIAG INJ SC/IM: CPT

## 2017-01-01 PROCEDURE — 85610 PROTHROMBIN TIME: CPT | Performed by: ANESTHESIOLOGY

## 2017-01-01 PROCEDURE — 74011250636 HC RX REV CODE- 250/636: Performed by: INTERNAL MEDICINE

## 2017-01-01 PROCEDURE — 80053 COMPREHEN METABOLIC PANEL: CPT | Performed by: NURSE PRACTITIONER

## 2017-01-01 PROCEDURE — 84156 ASSAY OF PROTEIN URINE: CPT | Performed by: NURSE PRACTITIONER

## 2017-01-01 PROCEDURE — 70450 CT HEAD/BRAIN W/O DYE: CPT

## 2017-01-01 PROCEDURE — 88307 TISSUE EXAM BY PATHOLOGIST: CPT | Performed by: SURGERY

## 2017-01-01 PROCEDURE — 97162 PT EVAL MOD COMPLEX 30 MIN: CPT

## 2017-01-01 PROCEDURE — 86644 CMV ANTIBODY: CPT | Performed by: INTERNAL MEDICINE

## 2017-01-01 PROCEDURE — G0299 HHS/HOSPICE OF RN EA 15 MIN: HCPCS

## 2017-01-01 PROCEDURE — 74011250636 HC RX REV CODE- 250/636: Performed by: NURSE PRACTITIONER

## 2017-01-01 PROCEDURE — 99239 HOSP IP/OBS DSCHRG MGMT >30: CPT | Performed by: INTERNAL MEDICINE

## 2017-01-01 PROCEDURE — 84156 ASSAY OF PROTEIN URINE: CPT | Performed by: INTERNAL MEDICINE

## 2017-01-01 PROCEDURE — 85025 COMPLETE CBC W/AUTO DIFF WBC: CPT | Performed by: INTERNAL MEDICINE

## 2017-01-01 PROCEDURE — 96411 CHEMO IV PUSH ADDL DRUG: CPT

## 2017-01-01 PROCEDURE — 77030029359 HC PRB ESOPH TEMP CATH ANTM -F: Performed by: ANESTHESIOLOGY

## 2017-01-01 PROCEDURE — 96368 THER/DIAG CONCURRENT INF: CPT

## 2017-01-01 PROCEDURE — 99233 SBSQ HOSP IP/OBS HIGH 50: CPT | Performed by: INTERNAL MEDICINE

## 2017-01-01 PROCEDURE — 83735 ASSAY OF MAGNESIUM: CPT | Performed by: NURSE PRACTITIONER

## 2017-01-01 PROCEDURE — 74011250636 HC RX REV CODE- 250/636: Performed by: EMERGENCY MEDICINE

## 2017-01-01 PROCEDURE — 65660000000 HC RM CCU STEPDOWN

## 2017-01-01 PROCEDURE — 94760 N-INVAS EAR/PLS OXIMETRY 1: CPT

## 2017-01-01 PROCEDURE — 36415 COLL VENOUS BLD VENIPUNCTURE: CPT | Performed by: EMERGENCY MEDICINE

## 2017-01-01 PROCEDURE — 71010 XR CHEST SNGL V: CPT

## 2017-01-01 PROCEDURE — 74011250636 HC RX REV CODE- 250/636

## 2017-01-01 PROCEDURE — 65270000029 HC RM PRIVATE

## 2017-01-01 PROCEDURE — 74177 CT ABD & PELVIS W/CONTRAST: CPT

## 2017-01-01 PROCEDURE — 36415 COLL VENOUS BLD VENIPUNCTURE: CPT | Performed by: NURSE PRACTITIONER

## 2017-01-01 PROCEDURE — 77030003029 HC SUT VCRL J&J -B: Performed by: SURGERY

## 2017-01-01 PROCEDURE — 80048 BASIC METABOLIC PNL TOTAL CA: CPT | Performed by: SURGERY

## 2017-01-01 PROCEDURE — 82378 CARCINOEMBRYONIC ANTIGEN: CPT | Performed by: INTERNAL MEDICINE

## 2017-01-01 PROCEDURE — 96523 IRRIG DRUG DELIVERY DEVICE: CPT

## 2017-01-01 PROCEDURE — 74011000258 HC RX REV CODE- 258: Performed by: HOSPITALIST

## 2017-01-01 PROCEDURE — 77030003560 HC NDL HUBR BARD -A

## 2017-01-01 PROCEDURE — 96415 CHEMO IV INFUSION ADDL HR: CPT

## 2017-01-01 PROCEDURE — G0152 HHCP-SERV OF OT,EA 15 MIN: HCPCS

## 2017-01-01 PROCEDURE — 36415 COLL VENOUS BLD VENIPUNCTURE: CPT | Performed by: INTERNAL MEDICINE

## 2017-01-01 PROCEDURE — 77334 RADIATION TREATMENT AID(S): CPT

## 2017-01-01 PROCEDURE — 85025 COMPLETE CBC W/AUTO DIFF WBC: CPT | Performed by: NURSE PRACTITIONER

## 2017-01-01 PROCEDURE — 77336 RADIATION PHYSICS CONSULT: CPT

## 2017-01-01 PROCEDURE — 77030008477 HC STYL SATN SLP COVD -A: Performed by: ANESTHESIOLOGY

## 2017-01-01 PROCEDURE — 80048 BASIC METABOLIC PNL TOTAL CA: CPT | Performed by: INTERNAL MEDICINE

## 2017-01-01 PROCEDURE — 96413 CHEMO IV INFUSION 1 HR: CPT

## 2017-01-01 PROCEDURE — 80053 COMPREHEN METABOLIC PANEL: CPT | Performed by: INTERNAL MEDICINE

## 2017-01-01 PROCEDURE — 74011000258 HC RX REV CODE- 258: Performed by: INTERNAL MEDICINE

## 2017-01-01 PROCEDURE — 84100 ASSAY OF PHOSPHORUS: CPT | Performed by: SURGERY

## 2017-01-01 PROCEDURE — 99211 OFF/OP EST MAY X REQ PHY/QHP: CPT

## 2017-01-01 PROCEDURE — 74011636320 HC RX REV CODE- 636/320: Performed by: INTERNAL MEDICINE

## 2017-01-01 PROCEDURE — 87040 BLOOD CULTURE FOR BACTERIA: CPT | Performed by: EMERGENCY MEDICINE

## 2017-01-01 PROCEDURE — 85018 HEMOGLOBIN: CPT | Performed by: INTERNAL MEDICINE

## 2017-01-01 PROCEDURE — 71010 XR CHEST PORT: CPT

## 2017-01-01 PROCEDURE — 77030010522

## 2017-01-01 PROCEDURE — 93005 ELECTROCARDIOGRAM TRACING: CPT | Performed by: EMERGENCY MEDICINE

## 2017-01-01 PROCEDURE — 74011250637 HC RX REV CODE- 250/637: Performed by: INTERNAL MEDICINE

## 2017-01-01 PROCEDURE — 83605 ASSAY OF LACTIC ACID: CPT

## 2017-01-01 PROCEDURE — 96125 COGNITIVE TEST BY HC PRO: CPT

## 2017-01-01 PROCEDURE — 77030032490 HC SLV COMPR SCD KNE COVD -B

## 2017-01-01 PROCEDURE — G0151 HHCP-SERV OF PT,EA 15 MIN: HCPCS

## 2017-01-01 PROCEDURE — 77030013131 HC IV BLD ST ICUM -A

## 2017-01-01 PROCEDURE — 99283 EMERGENCY DEPT VISIT LOW MDM: CPT | Performed by: EMERGENCY MEDICINE

## 2017-01-01 PROCEDURE — 74011250637 HC RX REV CODE- 250/637: Performed by: SURGERY

## 2017-01-01 PROCEDURE — 96375 TX/PRO/DX INJ NEW DRUG ADDON: CPT

## 2017-01-01 PROCEDURE — 74011000250 HC RX REV CODE- 250: Performed by: SURGERY

## 2017-01-01 PROCEDURE — 74011636637 HC RX REV CODE- 636/637: Performed by: HOSPITALIST

## 2017-01-01 PROCEDURE — 99223 1ST HOSP IP/OBS HIGH 75: CPT | Performed by: INTERNAL MEDICINE

## 2017-01-01 PROCEDURE — 74011250637 HC RX REV CODE- 250/637: Performed by: NURSE ANESTHETIST, CERTIFIED REGISTERED

## 2017-01-01 PROCEDURE — A9552 F18 FDG: HCPCS

## 2017-01-01 PROCEDURE — 77030002996 HC SUT SLK J&J -A: Performed by: SURGERY

## 2017-01-01 PROCEDURE — 77290 THER RAD SIMULAJ FIELD CPLX: CPT

## 2017-01-01 PROCEDURE — 96365 THER/PROPH/DIAG IV INF INIT: CPT | Performed by: EMERGENCY MEDICINE

## 2017-01-01 PROCEDURE — P9040 RBC LEUKOREDUCED IRRADIATED: HCPCS | Performed by: SURGERY

## 2017-01-01 PROCEDURE — 93005 ELECTROCARDIOGRAM TRACING: CPT | Performed by: SURGERY

## 2017-01-01 PROCEDURE — 85025 COMPLETE CBC W/AUTO DIFF WBC: CPT | Performed by: EMERGENCY MEDICINE

## 2017-01-01 PROCEDURE — 80053 COMPREHEN METABOLIC PANEL: CPT

## 2017-01-01 PROCEDURE — 82728 ASSAY OF FERRITIN: CPT | Performed by: INTERNAL MEDICINE

## 2017-01-01 PROCEDURE — 77030008771 HC TU NG SALEM SUMP -A: Performed by: ANESTHESIOLOGY

## 2017-01-01 PROCEDURE — 84132 ASSAY OF SERUM POTASSIUM: CPT

## 2017-01-01 PROCEDURE — 96417 CHEMO IV INFUS EACH ADDL SEQ: CPT

## 2017-01-01 PROCEDURE — 88309 TISSUE EXAM BY PATHOLOGIST: CPT | Performed by: SURGERY

## 2017-01-01 PROCEDURE — 96375 TX/PRO/DX INJ NEW DRUG ADDON: CPT | Performed by: EMERGENCY MEDICINE

## 2017-01-01 PROCEDURE — 80202 ASSAY OF VANCOMYCIN: CPT | Performed by: INTERNAL MEDICINE

## 2017-01-01 PROCEDURE — 86923 COMPATIBILITY TEST ELECTRIC: CPT | Performed by: SURGERY

## 2017-01-01 PROCEDURE — 86900 BLOOD TYPING SEROLOGIC ABO: CPT | Performed by: SURGERY

## 2017-01-01 PROCEDURE — P9040 RBC LEUKOREDUCED IRRADIATED: HCPCS | Performed by: EMERGENCY MEDICINE

## 2017-01-01 PROCEDURE — 96361 HYDRATE IV INFUSION ADD-ON: CPT | Performed by: EMERGENCY MEDICINE

## 2017-01-01 PROCEDURE — 86900 BLOOD TYPING SEROLOGIC ABO: CPT | Performed by: EMERGENCY MEDICINE

## 2017-01-01 PROCEDURE — 77030009978 HC RELD STPLR TCR J&J -B: Performed by: SURGERY

## 2017-01-01 PROCEDURE — 74011000250 HC RX REV CODE- 250

## 2017-01-01 PROCEDURE — 81015 MICROSCOPIC EXAM OF URINE: CPT | Performed by: EMERGENCY MEDICINE

## 2017-01-01 PROCEDURE — 77331 SPECIAL RADIATION DOSIMETRY: CPT

## 2017-01-01 PROCEDURE — 74011000302 HC RX REV CODE- 302: Performed by: INTERNAL MEDICINE

## 2017-01-01 PROCEDURE — 99284 EMERGENCY DEPT VISIT MOD MDM: CPT | Performed by: EMERGENCY MEDICINE

## 2017-01-01 PROCEDURE — G0158 HHC OT ASSISTANT EA 15: HCPCS

## 2017-01-01 PROCEDURE — G0153 HHCP-SVS OF S/L PATH,EA 15MN: HCPCS

## 2017-01-01 PROCEDURE — 77030011278 HC ELECTRD LIG IMPT COVD -F: Performed by: SURGERY

## 2017-01-01 PROCEDURE — 74011636637 HC RX REV CODE- 636/637: Performed by: NURSE PRACTITIONER

## 2017-01-01 PROCEDURE — 94762 N-INVAS EAR/PLS OXIMTRY CONT: CPT

## 2017-01-01 PROCEDURE — 86644 CMV ANTIBODY: CPT | Performed by: EMERGENCY MEDICINE

## 2017-01-01 PROCEDURE — 77399 UNLISTED PX MED RADJ PHYSICS: CPT

## 2017-01-01 PROCEDURE — 83735 ASSAY OF MAGNESIUM: CPT

## 2017-01-01 PROCEDURE — 99255 IP/OBS CONSLTJ NEW/EST HI 80: CPT | Performed by: INTERNAL MEDICINE

## 2017-01-01 PROCEDURE — 83540 ASSAY OF IRON: CPT | Performed by: INTERNAL MEDICINE

## 2017-01-01 PROCEDURE — 76210000001 HC OR PH I REC 2.5 TO 3 HR: Performed by: SURGERY

## 2017-01-01 PROCEDURE — 87804 INFLUENZA ASSAY W/OPTIC: CPT | Performed by: EMERGENCY MEDICINE

## 2017-01-01 PROCEDURE — 36415 COLL VENOUS BLD VENIPUNCTURE: CPT | Performed by: SURGERY

## 2017-01-01 PROCEDURE — 82962 GLUCOSE BLOOD TEST: CPT

## 2017-01-01 PROCEDURE — 0D1M0Z4 BYPASS DESCENDING COLON TO CUTANEOUS, OPEN APPROACH: ICD-10-PCS | Performed by: SURGERY

## 2017-01-01 PROCEDURE — 77030018521 HC STPLR ENDOSCOPIC J&J -C: Performed by: SURGERY

## 2017-01-01 PROCEDURE — 96374 THER/PROPH/DIAG INJ IV PUSH: CPT | Performed by: EMERGENCY MEDICINE

## 2017-01-01 PROCEDURE — 77030020782 HC GWN BAIR PAWS FLX 3M -B: Performed by: ANESTHESIOLOGY

## 2017-01-01 PROCEDURE — 86580 TB INTRADERMAL TEST: CPT | Performed by: INTERNAL MEDICINE

## 2017-01-01 PROCEDURE — 85027 COMPLETE CBC AUTOMATED: CPT | Performed by: INTERNAL MEDICINE

## 2017-01-01 PROCEDURE — 74011250637 HC RX REV CODE- 250/637: Performed by: FAMILY MEDICINE

## 2017-01-01 PROCEDURE — 74011250636 HC RX REV CODE- 250/636: Performed by: ANESTHESIOLOGY

## 2017-01-01 PROCEDURE — 0TBB0ZZ EXCISION OF BLADDER, OPEN APPROACH: ICD-10-PCS | Performed by: SURGERY

## 2017-01-01 PROCEDURE — 77030012406 HC DRN WND PENRS BARD -A: Performed by: SURGERY

## 2017-01-01 PROCEDURE — 70553 MRI BRAIN STEM W/O & W/DYE: CPT

## 2017-01-01 PROCEDURE — 36415 COLL VENOUS BLD VENIPUNCTURE: CPT | Performed by: FAMILY MEDICINE

## 2017-01-01 PROCEDURE — 77030019938 HC TBNG IV PCA ICUM -A

## 2017-01-01 PROCEDURE — A4385 OST SKN BARRIER SLD EXT WEAR: HCPCS

## 2017-01-01 PROCEDURE — 99253 IP/OBS CNSLTJ NEW/EST LOW 45: CPT | Performed by: PHYSICAL MEDICINE & REHABILITATION

## 2017-01-01 PROCEDURE — 83735 ASSAY OF MAGNESIUM: CPT | Performed by: SURGERY

## 2017-01-01 PROCEDURE — 77030009962 HC RELD STPLR CR J&J -C: Performed by: SURGERY

## 2017-01-01 PROCEDURE — 77030020407 HC IV BLD WRMR ST 3M -A: Performed by: ANESTHESIOLOGY

## 2017-01-01 PROCEDURE — 72100 X-RAY EXAM L-S SPINE 2/3 VWS: CPT

## 2017-01-01 PROCEDURE — 77030036998 HC STPLR CRV CUT CNTOUR J&J -F: Performed by: SURGERY

## 2017-01-01 PROCEDURE — A9577 INJ MULTIHANCE: HCPCS | Performed by: INTERNAL MEDICINE

## 2017-01-01 PROCEDURE — 77030034850: Performed by: SURGERY

## 2017-01-01 PROCEDURE — 83690 ASSAY OF LIPASE: CPT | Performed by: EMERGENCY MEDICINE

## 2017-01-01 PROCEDURE — 36591 DRAW BLOOD OFF VENOUS DEVICE: CPT

## 2017-01-01 PROCEDURE — G0157 HHC PT ASSISTANT EA 15: HCPCS

## 2017-01-01 PROCEDURE — 74011636320 HC RX REV CODE- 636/320: Performed by: EMERGENCY MEDICINE

## 2017-01-01 PROCEDURE — 97167 OT EVAL HIGH COMPLEX 60 MIN: CPT

## 2017-01-01 PROCEDURE — C9113 INJ PANTOPRAZOLE SODIUM, VIA: HCPCS | Performed by: SURGERY

## 2017-01-01 PROCEDURE — 85730 THROMBOPLASTIN TIME PARTIAL: CPT | Performed by: ANESTHESIOLOGY

## 2017-01-01 PROCEDURE — 94762 N-INVAS EAR/PLS OXIMTRY CONT: CPT | Performed by: EMERGENCY MEDICINE

## 2017-01-01 PROCEDURE — 97110 THERAPEUTIC EXERCISES: CPT

## 2017-01-01 PROCEDURE — 85018 HEMOGLOBIN: CPT | Performed by: FAMILY MEDICINE

## 2017-01-01 PROCEDURE — 97161 PT EVAL LOW COMPLEX 20 MIN: CPT

## 2017-01-01 PROCEDURE — 400013 HH SOC

## 2017-01-01 PROCEDURE — 77030008703 HC TU ET UNCUF COVD -A: Performed by: ANESTHESIOLOGY

## 2017-01-01 PROCEDURE — 77030010541

## 2017-01-01 PROCEDURE — 85610 PROTHROMBIN TIME: CPT | Performed by: EMERGENCY MEDICINE

## 2017-01-01 PROCEDURE — 72158 MRI LUMBAR SPINE W/O & W/DYE: CPT

## 2017-01-01 PROCEDURE — 30233N1 TRANSFUSION OF NONAUTOLOGOUS RED BLOOD CELLS INTO PERIPHERAL VEIN, PERCUTANEOUS APPROACH: ICD-10-PCS | Performed by: INTERNAL MEDICINE

## 2017-01-01 PROCEDURE — 76060000033 HC ANESTHESIA 1 TO 1.5 HR

## 2017-01-01 PROCEDURE — 77030008467 HC STPLR SKN COVD -B: Performed by: SURGERY

## 2017-01-01 PROCEDURE — 83735 ASSAY OF MAGNESIUM: CPT | Performed by: INTERNAL MEDICINE

## 2017-01-01 PROCEDURE — 77030011640 HC PAD GRND REM COVD -A: Performed by: SURGERY

## 2017-01-01 PROCEDURE — 84145 PROCALCITONIN (PCT): CPT | Performed by: INTERNAL MEDICINE

## 2017-01-01 PROCEDURE — 96376 TX/PRO/DX INJ SAME DRUG ADON: CPT | Performed by: EMERGENCY MEDICINE

## 2017-01-01 PROCEDURE — 74011000258 HC RX REV CODE- 258: Performed by: NURSE PRACTITIONER

## 2017-01-01 PROCEDURE — 77030031139 HC SUT VCRL2 J&J -A: Performed by: SURGERY

## 2017-01-01 PROCEDURE — 96365 THER/PROPH/DIAG IV INF INIT: CPT

## 2017-01-01 PROCEDURE — 84145 PROCALCITONIN (PCT): CPT | Performed by: EMERGENCY MEDICINE

## 2017-01-01 PROCEDURE — 97530 THERAPEUTIC ACTIVITIES: CPT

## 2017-01-01 PROCEDURE — 77417 THER RADIOLOGY PORT IMAGE(S): CPT

## 2017-01-01 PROCEDURE — 0UT90ZZ RESECTION OF UTERUS, OPEN APPROACH: ICD-10-PCS | Performed by: SURGERY

## 2017-01-01 PROCEDURE — 76450000000

## 2017-01-01 PROCEDURE — 87641 MR-STAPH DNA AMP PROBE: CPT | Performed by: INTERNAL MEDICINE

## 2017-01-01 PROCEDURE — 88305 TISSUE EXAM BY PATHOLOGIST: CPT | Performed by: SURGERY

## 2017-01-01 PROCEDURE — 86923 COMPATIBILITY TEST ELECTRIC: CPT | Performed by: EMERGENCY MEDICINE

## 2017-01-01 PROCEDURE — 74011250637 HC RX REV CODE- 250/637: Performed by: ANESTHESIOLOGY

## 2017-01-01 PROCEDURE — 82378 CARCINOEMBRYONIC ANTIGEN: CPT | Performed by: NURSE PRACTITIONER

## 2017-01-01 PROCEDURE — A4424 OST PCH DRAIN W BAR & FILTER: HCPCS

## 2017-01-01 PROCEDURE — 81003 URINALYSIS AUTO W/O SCOPE: CPT | Performed by: EMERGENCY MEDICINE

## 2017-01-01 PROCEDURE — 77030034849

## 2017-01-01 PROCEDURE — 96366 THER/PROPH/DIAG IV INF ADDON: CPT

## 2017-01-01 PROCEDURE — 77030013567 HC DRN WND RESERV BARD -A: Performed by: SURGERY

## 2017-01-01 PROCEDURE — 76060000039 HC ANESTHESIA 4 TO 4.5 HR: Performed by: SURGERY

## 2017-01-01 PROCEDURE — 77030008771 HC TU NG SALEM SUMP -A: Performed by: SURGERY

## 2017-01-01 PROCEDURE — 80048 BASIC METABOLIC PNL TOTAL CA: CPT | Performed by: NURSE PRACTITIONER

## 2017-01-01 PROCEDURE — 76210000016 HC OR PH I REC 1 TO 1.5 HR

## 2017-01-01 PROCEDURE — 85025 COMPLETE CBC W/AUTO DIFF WBC: CPT

## 2017-01-01 PROCEDURE — 72170 X-RAY EXAM OF PELVIS: CPT

## 2017-01-01 PROCEDURE — 74011000250 HC RX REV CODE- 250: Performed by: ANESTHESIOLOGY

## 2017-01-01 PROCEDURE — 76010000175 HC OR TIME 4 TO 4.5 HR INTENSV-TIER 1: Performed by: SURGERY

## 2017-01-01 PROCEDURE — 0DTN0ZZ RESECTION OF SIGMOID COLON, OPEN APPROACH: ICD-10-PCS | Performed by: SURGERY

## 2017-01-01 PROCEDURE — 77030002966 HC SUT PDS J&J -A: Performed by: SURGERY

## 2017-01-01 PROCEDURE — 86900 BLOOD TYPING SEROLOGIC ABO: CPT | Performed by: INTERNAL MEDICINE

## 2017-01-01 PROCEDURE — 84100 ASSAY OF PHOSPHORUS: CPT | Performed by: INTERNAL MEDICINE

## 2017-01-01 PROCEDURE — 0UT60ZZ RESECTION OF LEFT FALLOPIAN TUBE, OPEN APPROACH: ICD-10-PCS | Performed by: SURGERY

## 2017-01-01 PROCEDURE — 81015 MICROSCOPIC EXAM OF URINE: CPT | Performed by: INTERNAL MEDICINE

## 2017-01-01 PROCEDURE — 96366 THER/PROPH/DIAG IV INF ADDON: CPT | Performed by: EMERGENCY MEDICINE

## 2017-01-01 PROCEDURE — 86923 COMPATIBILITY TEST ELECTRIC: CPT | Performed by: INTERNAL MEDICINE

## 2017-01-01 PROCEDURE — 77030011264 HC ELECTRD BLD EXT COVD -A: Performed by: SURGERY

## 2017-01-01 PROCEDURE — 77300 RADIATION THERAPY DOSE PLAN: CPT

## 2017-01-01 PROCEDURE — 81003 URINALYSIS AUTO W/O SCOPE: CPT | Performed by: INTERNAL MEDICINE

## 2017-01-01 PROCEDURE — 77030019908 HC STETH ESOPH SIMS -A: Performed by: ANESTHESIOLOGY

## 2017-01-01 PROCEDURE — 77030032490 HC SLV COMPR SCD KNE COVD -B: Performed by: SURGERY

## 2017-01-01 PROCEDURE — 0WBF0ZZ EXCISION OF ABDOMINAL WALL, OPEN APPROACH: ICD-10-PCS | Performed by: SURGERY

## 2017-01-01 PROCEDURE — 97166 OT EVAL MOD COMPLEX 45 MIN: CPT

## 2017-01-01 PROCEDURE — 77010033678 HC OXYGEN DAILY

## 2017-01-01 RX ORDER — SODIUM CHLORIDE 0.9 % (FLUSH) 0.9 %
5-10 SYRINGE (ML) INJECTION EVERY 8 HOURS
Status: DISCONTINUED | OUTPATIENT
Start: 2017-01-01 | End: 2017-01-01 | Stop reason: HOSPADM

## 2017-01-01 RX ORDER — PAROXETINE HYDROCHLORIDE 40 MG/1
40 TABLET, FILM COATED ORAL DAILY
Qty: 30 TAB | Refills: 2 | Status: SHIPPED | OUTPATIENT
Start: 2017-01-01 | End: 2017-01-01 | Stop reason: SDUPTHER

## 2017-01-01 RX ORDER — DEXTROSE MONOHYDRATE 50 MG/ML
25 INJECTION, SOLUTION INTRAVENOUS CONTINUOUS
Status: ACTIVE | OUTPATIENT
Start: 2017-01-01 | End: 2017-01-01

## 2017-01-01 RX ORDER — ADHESIVE BANDAGE
30 BANDAGE TOPICAL 2 TIMES DAILY
Status: DISCONTINUED | OUTPATIENT
Start: 2017-01-01 | End: 2017-01-01 | Stop reason: HOSPADM

## 2017-01-01 RX ORDER — LORAZEPAM 1 MG/1
1 TABLET ORAL EVERY 6 HOURS
Status: DISCONTINUED | OUTPATIENT
Start: 2017-01-01 | End: 2017-01-01

## 2017-01-01 RX ORDER — MORPHINE SULFATE 4 MG/ML
4 INJECTION, SOLUTION INTRAMUSCULAR; INTRAVENOUS
Status: COMPLETED | OUTPATIENT
Start: 2017-01-01 | End: 2017-01-01

## 2017-01-01 RX ORDER — METOPROLOL TARTRATE 5 MG/5ML
INJECTION INTRAVENOUS
Status: ACTIVE
Start: 2017-01-01 | End: 2017-01-01

## 2017-01-01 RX ORDER — HYDROMORPHONE HYDROCHLORIDE 1 MG/ML
0.5 INJECTION, SOLUTION INTRAMUSCULAR; INTRAVENOUS; SUBCUTANEOUS
Status: DISCONTINUED | OUTPATIENT
Start: 2017-01-01 | End: 2017-01-01 | Stop reason: HOSPADM

## 2017-01-01 RX ORDER — LEVOFLOXACIN 750 MG/1
750 TABLET ORAL EVERY 24 HOURS
Qty: 5 TAB | Refills: 0 | Status: SHIPPED | OUTPATIENT
Start: 2017-01-01 | End: 2017-01-01

## 2017-01-01 RX ORDER — HYDROMORPHONE HYDROCHLORIDE 1 MG/ML
1-2 INJECTION, SOLUTION INTRAMUSCULAR; INTRAVENOUS; SUBCUTANEOUS
Status: DISCONTINUED | OUTPATIENT
Start: 2017-01-01 | End: 2017-01-01

## 2017-01-01 RX ORDER — METOPROLOL TARTRATE 5 MG/5ML
2.5 INJECTION INTRAVENOUS ONCE
Status: COMPLETED | OUTPATIENT
Start: 2017-01-01 | End: 2017-01-01

## 2017-01-01 RX ORDER — SODIUM CHLORIDE 0.9 % (FLUSH) 0.9 %
10-40 SYRINGE (ML) INJECTION AS NEEDED
Status: DISCONTINUED | OUTPATIENT
Start: 2017-01-01 | End: 2017-01-01 | Stop reason: HOSPADM

## 2017-01-01 RX ORDER — VANCOMYCIN HYDROCHLORIDE
1250 EVERY 24 HOURS
Status: DISCONTINUED | OUTPATIENT
Start: 2017-01-01 | End: 2017-01-01

## 2017-01-01 RX ORDER — SODIUM CHLORIDE 9 MG/ML
250 INJECTION, SOLUTION INTRAVENOUS AS NEEDED
Status: DISCONTINUED | OUTPATIENT
Start: 2017-01-01 | End: 2017-01-01 | Stop reason: SDUPTHER

## 2017-01-01 RX ORDER — POTASSIUM CHLORIDE 29.8 MG/ML
40 INJECTION INTRAVENOUS ONCE
Status: COMPLETED | OUTPATIENT
Start: 2017-01-01 | End: 2017-01-01

## 2017-01-01 RX ORDER — SODIUM CHLORIDE 9 MG/ML
1000 INJECTION, SOLUTION INTRAVENOUS ONCE
Status: COMPLETED | OUTPATIENT
Start: 2017-01-01 | End: 2017-01-01

## 2017-01-01 RX ORDER — PREDNISONE 10 MG/1
10 TABLET ORAL
Status: DISCONTINUED | OUTPATIENT
Start: 2017-01-01 | End: 2017-01-01 | Stop reason: HOSPADM

## 2017-01-01 RX ORDER — SODIUM CHLORIDE 9 MG/ML
250 INJECTION, SOLUTION INTRAVENOUS AS NEEDED
Status: DISCONTINUED | OUTPATIENT
Start: 2017-01-01 | End: 2017-01-01 | Stop reason: HOSPADM

## 2017-01-01 RX ORDER — HYDROCODONE BITARTRATE AND ACETAMINOPHEN 7.5; 325 MG/1; MG/1
1-2 TABLET ORAL
Status: DISCONTINUED | OUTPATIENT
Start: 2017-01-01 | End: 2017-01-01

## 2017-01-01 RX ORDER — DEXAMETHASONE SODIUM PHOSPHATE 100 MG/10ML
10 INJECTION INTRAMUSCULAR; INTRAVENOUS ONCE
Status: COMPLETED | OUTPATIENT
Start: 2017-01-01 | End: 2017-01-01

## 2017-01-01 RX ORDER — DIPHENHYDRAMINE HYDROCHLORIDE 50 MG/ML
50 INJECTION, SOLUTION INTRAMUSCULAR; INTRAVENOUS ONCE
Status: COMPLETED | OUTPATIENT
Start: 2017-01-01 | End: 2017-01-01

## 2017-01-01 RX ORDER — HYDROMORPHONE HYDROCHLORIDE 1 MG/ML
1 INJECTION, SOLUTION INTRAMUSCULAR; INTRAVENOUS; SUBCUTANEOUS
Status: DISCONTINUED | OUTPATIENT
Start: 2017-01-01 | End: 2017-01-01 | Stop reason: HOSPADM

## 2017-01-01 RX ORDER — PANTOPRAZOLE SODIUM 40 MG/1
40 TABLET, DELAYED RELEASE ORAL
Qty: 30 TAB | Refills: 0 | Status: SHIPPED | OUTPATIENT
Start: 2017-01-01 | End: 2017-01-01 | Stop reason: SDUPTHER

## 2017-01-01 RX ORDER — DIPHENHYDRAMINE HYDROCHLORIDE 50 MG/ML
25 INJECTION, SOLUTION INTRAMUSCULAR; INTRAVENOUS ONCE
Status: COMPLETED | OUTPATIENT
Start: 2017-01-01 | End: 2017-01-01

## 2017-01-01 RX ORDER — ONDANSETRON 2 MG/ML
4 INJECTION INTRAMUSCULAR; INTRAVENOUS
Status: DISCONTINUED | OUTPATIENT
Start: 2017-01-01 | End: 2017-01-01 | Stop reason: HOSPADM

## 2017-01-01 RX ORDER — ATROPINE SULFATE 0.4 MG/ML
0.4 INJECTION, SOLUTION ENDOTRACHEAL; INTRAMEDULLARY; INTRAMUSCULAR; INTRAVENOUS; SUBCUTANEOUS ONCE
Status: COMPLETED | OUTPATIENT
Start: 2017-01-01 | End: 2017-01-01

## 2017-01-01 RX ORDER — HEPARIN 100 UNIT/ML
300 SYRINGE INTRAVENOUS AS NEEDED
Status: DISCONTINUED | OUTPATIENT
Start: 2017-01-01 | End: 2017-01-01 | Stop reason: HOSPADM

## 2017-01-01 RX ORDER — ALPRAZOLAM 0.5 MG/1
0.25 TABLET ORAL
Status: DISCONTINUED | OUTPATIENT
Start: 2017-01-01 | End: 2017-01-01 | Stop reason: HOSPADM

## 2017-01-01 RX ORDER — HYDROCODONE BITARTRATE AND ACETAMINOPHEN 5; 325 MG/1; MG/1
1-2 TABLET ORAL
Status: DISCONTINUED | OUTPATIENT
Start: 2017-01-01 | End: 2017-01-01 | Stop reason: HOSPADM

## 2017-01-01 RX ORDER — SODIUM CHLORIDE 0.9 % (FLUSH) 0.9 %
5 SYRINGE (ML) INJECTION EVERY 8 HOURS
Status: DISCONTINUED | OUTPATIENT
Start: 2017-01-01 | End: 2017-01-01 | Stop reason: HOSPADM

## 2017-01-01 RX ORDER — ACETAMINOPHEN 500 MG
1000 TABLET ORAL ONCE
Status: DISCONTINUED | OUTPATIENT
Start: 2017-01-01 | End: 2017-01-01 | Stop reason: HOSPADM

## 2017-01-01 RX ORDER — SODIUM CHLORIDE 0.9 % (FLUSH) 0.9 %
5-10 SYRINGE (ML) INJECTION AS NEEDED
Status: DISCONTINUED | OUTPATIENT
Start: 2017-01-01 | End: 2017-01-01 | Stop reason: HOSPADM

## 2017-01-01 RX ORDER — POTASSIUM CHLORIDE 14.9 MG/ML
20 INJECTION INTRAVENOUS
Status: COMPLETED | OUTPATIENT
Start: 2017-01-01 | End: 2017-01-01

## 2017-01-01 RX ORDER — FENTANYL 25 UG/1
1 PATCH TRANSDERMAL
Qty: 10 PATCH | Refills: 0 | Status: SHIPPED | OUTPATIENT
Start: 2017-01-01 | End: 2017-01-01

## 2017-01-01 RX ORDER — OXYCODONE HYDROCHLORIDE 5 MG/1
5 TABLET ORAL
Status: DISCONTINUED | OUTPATIENT
Start: 2017-01-01 | End: 2017-01-01 | Stop reason: HOSPADM

## 2017-01-01 RX ORDER — SODIUM CHLORIDE 9 MG/ML
250 INJECTION, SOLUTION INTRAVENOUS AS NEEDED
Status: DISCONTINUED | OUTPATIENT
Start: 2017-01-01 | End: 2017-01-01

## 2017-01-01 RX ORDER — LORAZEPAM 1 MG/1
1 TABLET ORAL
Status: DISCONTINUED | OUTPATIENT
Start: 2017-01-01 | End: 2017-01-01 | Stop reason: HOSPADM

## 2017-01-01 RX ORDER — HEPARIN 100 UNIT/ML
500 SYRINGE INTRAVENOUS AS NEEDED
Status: COMPLETED | OUTPATIENT
Start: 2017-01-01 | End: 2017-01-01

## 2017-01-01 RX ORDER — POTASSIUM CHLORIDE 14.9 MG/ML
20 INJECTION INTRAVENOUS
Status: DISPENSED | OUTPATIENT
Start: 2017-01-01 | End: 2017-01-01

## 2017-01-01 RX ORDER — PAROXETINE HYDROCHLORIDE 20 MG/1
40 TABLET, FILM COATED ORAL DAILY
Status: DISCONTINUED | OUTPATIENT
Start: 2017-01-01 | End: 2017-01-01 | Stop reason: HOSPADM

## 2017-01-01 RX ORDER — ACETAMINOPHEN 325 MG/1
650 TABLET ORAL ONCE
Status: COMPLETED | OUTPATIENT
Start: 2017-01-01 | End: 2017-01-01

## 2017-01-01 RX ORDER — FENTANYL 50 UG/1
1 PATCH TRANSDERMAL
Status: CANCELLED | OUTPATIENT
Start: 2017-01-01

## 2017-01-01 RX ORDER — HYDROCODONE BITARTRATE AND ACETAMINOPHEN 5; 325 MG/1; MG/1
2 TABLET ORAL
Qty: 240 TAB | Refills: 0 | Status: SHIPPED | OUTPATIENT
Start: 2017-01-01 | End: 2017-01-01

## 2017-01-01 RX ORDER — DRONABINOL 5 MG/1
5 CAPSULE ORAL
Qty: 30 CAP | Refills: 0 | Status: SHIPPED | OUTPATIENT
Start: 2017-01-01 | End: 2017-01-01 | Stop reason: CLARIF

## 2017-01-01 RX ORDER — SODIUM CHLORIDE 9 MG/ML
100 INJECTION, SOLUTION INTRAVENOUS CONTINUOUS
Status: DISCONTINUED | OUTPATIENT
Start: 2017-01-01 | End: 2017-01-01 | Stop reason: HOSPADM

## 2017-01-01 RX ORDER — PROPOFOL 10 MG/ML
INJECTION, EMULSION INTRAVENOUS AS NEEDED
Status: DISCONTINUED | OUTPATIENT
Start: 2017-01-01 | End: 2017-01-01 | Stop reason: HOSPADM

## 2017-01-01 RX ORDER — ALPRAZOLAM 0.5 MG/1
2 TABLET ORAL
Status: DISCONTINUED | OUTPATIENT
Start: 2017-01-01 | End: 2017-01-01

## 2017-01-01 RX ORDER — ONDANSETRON 2 MG/ML
8 INJECTION INTRAMUSCULAR; INTRAVENOUS ONCE
Status: COMPLETED | OUTPATIENT
Start: 2017-01-01 | End: 2017-01-01

## 2017-01-01 RX ORDER — HYDRALAZINE HYDROCHLORIDE 20 MG/ML
10 INJECTION INTRAMUSCULAR; INTRAVENOUS
Status: DISCONTINUED | OUTPATIENT
Start: 2017-01-01 | End: 2017-01-01 | Stop reason: HOSPADM

## 2017-01-01 RX ORDER — DEXTROSE MONOHYDRATE AND SODIUM CHLORIDE 5; .9 G/100ML; G/100ML
75 INJECTION, SOLUTION INTRAVENOUS CONTINUOUS
Status: DISCONTINUED | OUTPATIENT
Start: 2017-01-01 | End: 2017-01-01 | Stop reason: HOSPADM

## 2017-01-01 RX ORDER — DEXTROSE MONOHYDRATE AND SODIUM CHLORIDE 5; .45 G/100ML; G/100ML
50 INJECTION, SOLUTION INTRAVENOUS CONTINUOUS
Status: DISCONTINUED | OUTPATIENT
Start: 2017-01-01 | End: 2017-01-01

## 2017-01-01 RX ORDER — ROCURONIUM BROMIDE 10 MG/ML
INJECTION, SOLUTION INTRAVENOUS AS NEEDED
Status: DISCONTINUED | OUTPATIENT
Start: 2017-01-01 | End: 2017-01-01 | Stop reason: HOSPADM

## 2017-01-01 RX ORDER — LIDOCAINE HYDROCHLORIDE 10 MG/ML
0.1 INJECTION INFILTRATION; PERINEURAL AS NEEDED
Status: DISCONTINUED | OUTPATIENT
Start: 2017-01-01 | End: 2017-01-01 | Stop reason: HOSPADM

## 2017-01-01 RX ORDER — ACETAMINOPHEN 325 MG/1
650 TABLET ORAL
Status: DISCONTINUED | OUTPATIENT
Start: 2017-01-01 | End: 2017-01-01 | Stop reason: HOSPADM

## 2017-01-01 RX ORDER — ONDANSETRON 2 MG/ML
INJECTION INTRAMUSCULAR; INTRAVENOUS AS NEEDED
Status: DISCONTINUED | OUTPATIENT
Start: 2017-01-01 | End: 2017-01-01 | Stop reason: HOSPADM

## 2017-01-01 RX ORDER — DEXAMETHASONE SODIUM PHOSPHATE 4 MG/ML
4 INJECTION, SOLUTION INTRA-ARTICULAR; INTRALESIONAL; INTRAMUSCULAR; INTRAVENOUS; SOFT TISSUE EVERY 6 HOURS
Status: DISCONTINUED | OUTPATIENT
Start: 2017-01-01 | End: 2017-01-01 | Stop reason: HOSPADM

## 2017-01-01 RX ORDER — POTASSIUM CHLORIDE 20 MEQ/1
20 TABLET, EXTENDED RELEASE ORAL 2 TIMES DAILY
Status: DISCONTINUED | OUTPATIENT
Start: 2017-01-01 | End: 2017-01-01 | Stop reason: HOSPADM

## 2017-01-01 RX ORDER — SODIUM CHLORIDE 0.9 % (FLUSH) 0.9 %
10-40 SYRINGE (ML) INJECTION AS NEEDED
Status: ACTIVE | OUTPATIENT
Start: 2017-01-01 | End: 2017-01-01

## 2017-01-01 RX ORDER — SUCCINYLCHOLINE CHLORIDE 20 MG/ML
INJECTION INTRAMUSCULAR; INTRAVENOUS AS NEEDED
Status: DISCONTINUED | OUTPATIENT
Start: 2017-01-01 | End: 2017-01-01 | Stop reason: HOSPADM

## 2017-01-01 RX ORDER — MORPHINE SULFATE 4 MG/ML
2 INJECTION, SOLUTION INTRAMUSCULAR; INTRAVENOUS
Status: DISCONTINUED | OUTPATIENT
Start: 2017-01-01 | End: 2017-01-01 | Stop reason: HOSPADM

## 2017-01-01 RX ORDER — LORAZEPAM 2 MG/ML
1 INJECTION INTRAMUSCULAR
Status: DISCONTINUED | OUTPATIENT
Start: 2017-01-01 | End: 2017-01-01

## 2017-01-01 RX ORDER — OXYCODONE HCL 20 MG/1
20 TABLET, FILM COATED, EXTENDED RELEASE ORAL EVERY 12 HOURS
Status: DISCONTINUED | OUTPATIENT
Start: 2017-01-01 | End: 2017-01-01 | Stop reason: HOSPADM

## 2017-01-01 RX ORDER — METFORMIN HYDROCHLORIDE 500 MG/1
500 TABLET ORAL 2 TIMES DAILY WITH MEALS
Qty: 60 TAB | Refills: 2 | Status: SHIPPED | OUTPATIENT
Start: 2017-01-01 | End: 2017-01-01

## 2017-01-01 RX ORDER — HEPARIN 100 UNIT/ML
500 SYRINGE INTRAVENOUS AS NEEDED
Status: DISCONTINUED | OUTPATIENT
Start: 2017-01-01 | End: 2017-01-01 | Stop reason: HOSPADM

## 2017-01-01 RX ORDER — ALPRAZOLAM 2 MG/1
2 TABLET ORAL
COMMUNITY
End: 2017-01-01 | Stop reason: DRUGHIGH

## 2017-01-01 RX ORDER — LEVOFLOXACIN 5 MG/ML
750 INJECTION, SOLUTION INTRAVENOUS EVERY 24 HOURS
Status: DISCONTINUED | OUTPATIENT
Start: 2017-01-01 | End: 2017-01-01

## 2017-01-01 RX ORDER — PROCHLORPERAZINE MALEATE 10 MG
10 TABLET ORAL
Status: DISCONTINUED | OUTPATIENT
Start: 2017-01-01 | End: 2017-01-01 | Stop reason: HOSPADM

## 2017-01-01 RX ORDER — HEPARIN SODIUM 5000 [USP'U]/ML
5000 INJECTION, SOLUTION INTRAVENOUS; SUBCUTANEOUS EVERY 8 HOURS
Status: DISCONTINUED | OUTPATIENT
Start: 2017-01-01 | End: 2017-01-01 | Stop reason: HOSPADM

## 2017-01-01 RX ORDER — DIPHENHYDRAMINE HCL 25 MG
25 CAPSULE ORAL ONCE
Status: COMPLETED | OUTPATIENT
Start: 2017-01-01 | End: 2017-01-01

## 2017-01-01 RX ORDER — MAGNESIUM SULFATE HEPTAHYDRATE 40 MG/ML
2 INJECTION, SOLUTION INTRAVENOUS ONCE
Status: COMPLETED | OUTPATIENT
Start: 2017-01-01 | End: 2017-01-01

## 2017-01-01 RX ORDER — POTASSIUM CHLORIDE 14.9 MG/ML
20 INJECTION INTRAVENOUS ONCE
Status: DISCONTINUED | OUTPATIENT
Start: 2017-01-01 | End: 2017-01-01

## 2017-01-01 RX ORDER — HYDROMORPHONE HYDROCHLORIDE 4 MG/1
4-8 TABLET ORAL
Status: DISCONTINUED | OUTPATIENT
Start: 2017-01-01 | End: 2017-01-01

## 2017-01-01 RX ORDER — HYDROMORPHONE HYDROCHLORIDE 2 MG/ML
0.5 INJECTION, SOLUTION INTRAMUSCULAR; INTRAVENOUS; SUBCUTANEOUS
Status: DISCONTINUED | OUTPATIENT
Start: 2017-01-01 | End: 2017-01-01 | Stop reason: HOSPADM

## 2017-01-01 RX ORDER — CYCLOBENZAPRINE HCL 10 MG
10 TABLET ORAL
Status: DISCONTINUED | OUTPATIENT
Start: 2017-01-01 | End: 2017-01-01

## 2017-01-01 RX ORDER — PAROXETINE HYDROCHLORIDE 20 MG/1
20 TABLET, FILM COATED ORAL DAILY
Status: DISCONTINUED | OUTPATIENT
Start: 2017-01-01 | End: 2017-01-01

## 2017-01-01 RX ORDER — HEPARIN 100 UNIT/ML
500 SYRINGE INTRAVENOUS AS NEEDED
Status: ACTIVE | OUTPATIENT
Start: 2017-01-01 | End: 2017-01-01

## 2017-01-01 RX ORDER — SODIUM CHLORIDE, SODIUM LACTATE, POTASSIUM CHLORIDE, CALCIUM CHLORIDE 600; 310; 30; 20 MG/100ML; MG/100ML; MG/100ML; MG/100ML
75 INJECTION, SOLUTION INTRAVENOUS CONTINUOUS
Status: DISCONTINUED | OUTPATIENT
Start: 2017-01-01 | End: 2017-01-01 | Stop reason: HOSPADM

## 2017-01-01 RX ORDER — SODIUM CHLORIDE 0.9 % (FLUSH) 0.9 %
10 SYRINGE (ML) INJECTION AS NEEDED
Status: ACTIVE | OUTPATIENT
Start: 2017-01-01 | End: 2017-01-01

## 2017-01-01 RX ORDER — ADHESIVE BANDAGE
30 BANDAGE TOPICAL 2 TIMES DAILY
Qty: 1800 ML | Refills: 0 | Status: SHIPPED | OUTPATIENT
Start: 2017-01-01 | End: 2017-01-01

## 2017-01-01 RX ORDER — POTASSIUM CHLORIDE 29.8 MG/ML
20 INJECTION INTRAVENOUS ONCE
Status: DISCONTINUED | OUTPATIENT
Start: 2017-01-01 | End: 2017-01-01 | Stop reason: SDUPTHER

## 2017-01-01 RX ORDER — OXYCODONE HYDROCHLORIDE 5 MG/1
10 TABLET ORAL
Status: DISCONTINUED | OUTPATIENT
Start: 2017-01-01 | End: 2017-01-01 | Stop reason: HOSPADM

## 2017-01-01 RX ORDER — SODIUM CHLORIDE 0.9 % (FLUSH) 0.9 %
5-10 SYRINGE (ML) INJECTION EVERY 8 HOURS
Status: DISCONTINUED | OUTPATIENT
Start: 2017-01-01 | End: 2017-01-01 | Stop reason: SDUPTHER

## 2017-01-01 RX ORDER — FLUOROURACIL 50 MG/ML
250 INJECTION, SOLUTION INTRAVENOUS ONCE
Status: COMPLETED | OUTPATIENT
Start: 2017-01-01 | End: 2017-01-01

## 2017-01-01 RX ORDER — HEPARIN 100 UNIT/ML
500 SYRINGE INTRAVENOUS AS NEEDED
Status: DISPENSED | OUTPATIENT
Start: 2017-01-01 | End: 2017-01-01

## 2017-01-01 RX ORDER — OXYCODONE AND ACETAMINOPHEN 10; 325 MG/1; MG/1
1 TABLET ORAL
Status: DISCONTINUED | OUTPATIENT
Start: 2017-01-01 | End: 2017-01-01 | Stop reason: HOSPADM

## 2017-01-01 RX ORDER — HYDROMORPHONE HYDROCHLORIDE 1 MG/ML
1 INJECTION, SOLUTION INTRAMUSCULAR; INTRAVENOUS; SUBCUTANEOUS
Status: COMPLETED | OUTPATIENT
Start: 2017-01-01 | End: 2017-01-01

## 2017-01-01 RX ORDER — ACETAMINOPHEN 325 MG/1
650 TABLET ORAL
Status: COMPLETED | OUTPATIENT
Start: 2017-01-01 | End: 2017-01-01

## 2017-01-01 RX ORDER — METOPROLOL TARTRATE 25 MG/1
25 TABLET, FILM COATED ORAL EVERY 12 HOURS
Status: DISCONTINUED | OUTPATIENT
Start: 2017-01-01 | End: 2017-01-01

## 2017-01-01 RX ORDER — GLYCOPYRROLATE 0.2 MG/ML
INJECTION INTRAMUSCULAR; INTRAVENOUS AS NEEDED
Status: DISCONTINUED | OUTPATIENT
Start: 2017-01-01 | End: 2017-01-01 | Stop reason: HOSPADM

## 2017-01-01 RX ORDER — CYCLOBENZAPRINE HCL 10 MG
10 TABLET ORAL
Status: DISCONTINUED | OUTPATIENT
Start: 2017-01-01 | End: 2017-01-01 | Stop reason: HOSPADM

## 2017-01-01 RX ORDER — ESMOLOL HYDROCHLORIDE 10 MG/ML
INJECTION INTRAVENOUS AS NEEDED
Status: DISCONTINUED | OUTPATIENT
Start: 2017-01-01 | End: 2017-01-01 | Stop reason: HOSPADM

## 2017-01-01 RX ORDER — INSULIN LISPRO 100 [IU]/ML
INJECTION, SOLUTION INTRAVENOUS; SUBCUTANEOUS
Status: DISCONTINUED | OUTPATIENT
Start: 2017-01-01 | End: 2017-01-01 | Stop reason: HOSPADM

## 2017-01-01 RX ORDER — CYCLOBENZAPRINE HCL 5 MG
10 TABLET ORAL
COMMUNITY
End: 2017-01-01

## 2017-01-01 RX ORDER — FAMOTIDINE 10 MG/ML
INJECTION INTRAVENOUS
Status: ACTIVE
Start: 2017-01-01 | End: 2017-01-01

## 2017-01-01 RX ORDER — OXYCODONE AND ACETAMINOPHEN 5; 325 MG/1; MG/1
2 TABLET ORAL
Status: DISCONTINUED | OUTPATIENT
Start: 2017-01-01 | End: 2017-01-01

## 2017-01-01 RX ORDER — ONDANSETRON 4 MG/1
4 TABLET, ORALLY DISINTEGRATING ORAL
Status: DISCONTINUED | OUTPATIENT
Start: 2017-01-01 | End: 2017-01-01 | Stop reason: HOSPADM

## 2017-01-01 RX ORDER — SODIUM CHLORIDE 9 MG/ML
INJECTION, SOLUTION INTRAVENOUS
Status: DISCONTINUED | OUTPATIENT
Start: 2017-01-01 | End: 2017-01-01 | Stop reason: HOSPADM

## 2017-01-01 RX ORDER — DIPHENHYDRAMINE HYDROCHLORIDE 50 MG/ML
12.5 INJECTION, SOLUTION INTRAMUSCULAR; INTRAVENOUS
Status: DISCONTINUED | OUTPATIENT
Start: 2017-01-01 | End: 2017-01-01 | Stop reason: HOSPADM

## 2017-01-01 RX ORDER — MIDAZOLAM HYDROCHLORIDE 1 MG/ML
2 INJECTION, SOLUTION INTRAMUSCULAR; INTRAVENOUS
Status: COMPLETED | OUTPATIENT
Start: 2017-01-01 | End: 2017-01-01

## 2017-01-01 RX ORDER — SODIUM CHLORIDE 0.9 % (FLUSH) 0.9 %
10 SYRINGE (ML) INJECTION
Status: COMPLETED | OUTPATIENT
Start: 2017-01-01 | End: 2017-01-01

## 2017-01-01 RX ORDER — ONDANSETRON 2 MG/ML
4 INJECTION INTRAMUSCULAR; INTRAVENOUS
Status: COMPLETED | OUTPATIENT
Start: 2017-01-01 | End: 2017-01-01

## 2017-01-01 RX ORDER — TIZANIDINE 2 MG/1
2 TABLET ORAL
Status: DISCONTINUED | OUTPATIENT
Start: 2017-01-01 | End: 2017-01-01

## 2017-01-01 RX ORDER — LORAZEPAM 1 MG/1
1 TABLET ORAL 3 TIMES DAILY
Status: DISCONTINUED | OUTPATIENT
Start: 2017-01-01 | End: 2017-01-01

## 2017-01-01 RX ORDER — POTASSIUM CHLORIDE 29.8 MG/ML
40 INJECTION INTRAVENOUS ONCE
Status: DISCONTINUED | OUTPATIENT
Start: 2017-01-01 | End: 2017-01-01

## 2017-01-01 RX ORDER — DIPHENHYDRAMINE HYDROCHLORIDE 50 MG/ML
25 INJECTION, SOLUTION INTRAMUSCULAR; INTRAVENOUS
Status: DISCONTINUED | OUTPATIENT
Start: 2017-01-01 | End: 2017-01-01 | Stop reason: HOSPADM

## 2017-01-01 RX ORDER — FLUOROURACIL 50 MG/ML
300 INJECTION, SOLUTION INTRAVENOUS ONCE
Status: COMPLETED | OUTPATIENT
Start: 2017-01-01 | End: 2017-01-01

## 2017-01-01 RX ORDER — SODIUM CHLORIDE 0.9 % (FLUSH) 0.9 %
10-30 SYRINGE (ML) INJECTION AS NEEDED
Status: DISCONTINUED | OUTPATIENT
Start: 2017-01-01 | End: 2017-01-01 | Stop reason: HOSPADM

## 2017-01-01 RX ORDER — ENOXAPARIN SODIUM 100 MG/ML
40 INJECTION SUBCUTANEOUS EVERY 24 HOURS
Status: DISCONTINUED | OUTPATIENT
Start: 2017-01-01 | End: 2017-01-01 | Stop reason: HOSPADM

## 2017-01-01 RX ORDER — OXYCODONE HCL 10 MG/1
10 TABLET, FILM COATED, EXTENDED RELEASE ORAL EVERY 12 HOURS
Status: DISCONTINUED | OUTPATIENT
Start: 2017-01-01 | End: 2017-01-01

## 2017-01-01 RX ORDER — HYDROCODONE BITARTRATE AND ACETAMINOPHEN 5; 325 MG/1; MG/1
1 TABLET ORAL
Status: DISCONTINUED | OUTPATIENT
Start: 2017-01-01 | End: 2017-01-01 | Stop reason: HOSPADM

## 2017-01-01 RX ORDER — POTASSIUM CHLORIDE 29.8 MG/ML
20 INJECTION INTRAVENOUS ONCE
Status: COMPLETED | OUTPATIENT
Start: 2017-01-01 | End: 2017-01-01

## 2017-01-01 RX ORDER — OXYCODONE AND ACETAMINOPHEN 10; 325 MG/1; MG/1
1 TABLET ORAL
Qty: 40 TAB | Refills: 0 | Status: SHIPPED
Start: 2017-01-01 | End: 2017-01-01 | Stop reason: CLARIF

## 2017-01-01 RX ORDER — SODIUM CHLORIDE, SODIUM LACTATE, POTASSIUM CHLORIDE, CALCIUM CHLORIDE 600; 310; 30; 20 MG/100ML; MG/100ML; MG/100ML; MG/100ML
125 INJECTION, SOLUTION INTRAVENOUS CONTINUOUS
Status: DISPENSED | OUTPATIENT
Start: 2017-01-01 | End: 2017-01-01

## 2017-01-01 RX ORDER — LORAZEPAM 1 MG/1
1 TABLET ORAL
Status: DISCONTINUED | OUTPATIENT
Start: 2017-01-01 | End: 2017-01-01

## 2017-01-01 RX ORDER — POTASSIUM CHLORIDE 20 MEQ/1
40 TABLET, EXTENDED RELEASE ORAL
Status: COMPLETED | OUTPATIENT
Start: 2017-01-01 | End: 2017-01-01

## 2017-01-01 RX ORDER — PANTOPRAZOLE SODIUM 40 MG/1
40 TABLET, DELAYED RELEASE ORAL
Status: DISCONTINUED | OUTPATIENT
Start: 2017-01-01 | End: 2017-01-01 | Stop reason: HOSPADM

## 2017-01-01 RX ORDER — LIDOCAINE HYDROCHLORIDE 20 MG/ML
INJECTION, SOLUTION EPIDURAL; INFILTRATION; INTRACAUDAL; PERINEURAL AS NEEDED
Status: DISCONTINUED | OUTPATIENT
Start: 2017-01-01 | End: 2017-01-01 | Stop reason: HOSPADM

## 2017-01-01 RX ORDER — LEVETIRACETAM 1000 MG/1
1000 TABLET ORAL 2 TIMES DAILY
Qty: 60 TAB | Refills: 3 | Status: SHIPPED | OUTPATIENT
Start: 2017-01-01

## 2017-01-01 RX ORDER — NEOSTIGMINE METHYLSULFATE 1 MG/ML
INJECTION INTRAVENOUS AS NEEDED
Status: DISCONTINUED | OUTPATIENT
Start: 2017-01-01 | End: 2017-01-01 | Stop reason: HOSPADM

## 2017-01-01 RX ORDER — HYDROCODONE BITARTRATE AND ACETAMINOPHEN 5; 325 MG/1; MG/1
1 TABLET ORAL
Status: DISCONTINUED | OUTPATIENT
Start: 2017-01-01 | End: 2017-01-01

## 2017-01-01 RX ORDER — ZOLPIDEM TARTRATE 5 MG/1
5 TABLET ORAL
Status: DISCONTINUED | OUTPATIENT
Start: 2017-01-01 | End: 2017-01-01 | Stop reason: HOSPADM

## 2017-01-01 RX ORDER — POTASSIUM CHLORIDE 20 MEQ/1
40 TABLET, EXTENDED RELEASE ORAL 2 TIMES DAILY
Qty: 120 TAB | Refills: 0 | Status: SHIPPED | OUTPATIENT
Start: 2017-01-01 | End: 2017-01-01 | Stop reason: DRUGHIGH

## 2017-01-01 RX ORDER — HYDROMORPHONE HYDROCHLORIDE 2 MG/ML
INJECTION, SOLUTION INTRAMUSCULAR; INTRAVENOUS; SUBCUTANEOUS AS NEEDED
Status: DISCONTINUED | OUTPATIENT
Start: 2017-01-01 | End: 2017-01-01 | Stop reason: HOSPADM

## 2017-01-01 RX ORDER — POTASSIUM CHLORIDE 14.9 MG/ML
20 INJECTION INTRAVENOUS ONCE
Status: COMPLETED | OUTPATIENT
Start: 2017-01-01 | End: 2017-01-01

## 2017-01-01 RX ORDER — INSULIN LISPRO 100 [IU]/ML
INJECTION, SOLUTION INTRAVENOUS; SUBCUTANEOUS EVERY 4 HOURS
Status: DISCONTINUED | OUTPATIENT
Start: 2017-01-01 | End: 2017-01-01

## 2017-01-01 RX ORDER — HEPARIN 100 UNIT/ML
300-500 SYRINGE INTRAVENOUS AS NEEDED
Status: ACTIVE | OUTPATIENT
Start: 2017-01-01 | End: 2017-01-01

## 2017-01-01 RX ORDER — METOPROLOL TARTRATE 50 MG/1
50 TABLET ORAL EVERY 12 HOURS
Status: DISCONTINUED | OUTPATIENT
Start: 2017-01-01 | End: 2017-01-01 | Stop reason: HOSPADM

## 2017-01-01 RX ORDER — HYDROMORPHONE HYDROCHLORIDE 1 MG/ML
0.5 INJECTION, SOLUTION INTRAMUSCULAR; INTRAVENOUS; SUBCUTANEOUS
Status: COMPLETED | OUTPATIENT
Start: 2017-01-01 | End: 2017-01-01

## 2017-01-01 RX ORDER — LABETALOL HYDROCHLORIDE 5 MG/ML
INJECTION, SOLUTION INTRAVENOUS AS NEEDED
Status: DISCONTINUED | OUTPATIENT
Start: 2017-01-01 | End: 2017-01-01 | Stop reason: HOSPADM

## 2017-01-01 RX ORDER — GABAPENTIN 300 MG/1
600 CAPSULE ORAL ONCE
Status: COMPLETED | OUTPATIENT
Start: 2017-01-01 | End: 2017-01-01

## 2017-01-01 RX ORDER — FENTANYL 25 UG/1
1 PATCH TRANSDERMAL
Status: DISCONTINUED | OUTPATIENT
Start: 2017-01-01 | End: 2017-01-01

## 2017-01-01 RX ORDER — DEXTROSE, SODIUM CHLORIDE, AND POTASSIUM CHLORIDE 5; .45; .15 G/100ML; G/100ML; G/100ML
25 INJECTION INTRAVENOUS CONTINUOUS
Status: DISCONTINUED | OUTPATIENT
Start: 2017-01-01 | End: 2017-01-01

## 2017-01-01 RX ORDER — TRISODIUM CITRATE DIHYDRATE AND CITRIC ACID MONOHYDRATE 500; 334 MG/5ML; MG/5ML
30 SOLUTION ORAL
Status: COMPLETED | OUTPATIENT
Start: 2017-01-01 | End: 2017-01-01

## 2017-01-01 RX ORDER — DEXAMETHASONE SODIUM PHOSPHATE 4 MG/ML
INJECTION, SOLUTION INTRA-ARTICULAR; INTRALESIONAL; INTRAMUSCULAR; INTRAVENOUS; SOFT TISSUE AS NEEDED
Status: DISCONTINUED | OUTPATIENT
Start: 2017-01-01 | End: 2017-01-01 | Stop reason: HOSPADM

## 2017-01-01 RX ORDER — NALOXONE HYDROCHLORIDE 0.4 MG/ML
0.4 INJECTION, SOLUTION INTRAMUSCULAR; INTRAVENOUS; SUBCUTANEOUS AS NEEDED
Status: DISCONTINUED | OUTPATIENT
Start: 2017-01-01 | End: 2017-01-01 | Stop reason: HOSPADM

## 2017-01-01 RX ORDER — LEVOFLOXACIN 500 MG/1
500 TABLET, FILM COATED ORAL DAILY
Qty: 7 TAB | Refills: 0 | Status: SHIPPED | OUTPATIENT
Start: 2017-01-01 | End: 2017-01-01

## 2017-01-01 RX ORDER — SODIUM CHLORIDE 9 MG/ML
100 INJECTION, SOLUTION INTRAVENOUS AS NEEDED
Status: DISCONTINUED | OUTPATIENT
Start: 2017-01-01 | End: 2017-01-01 | Stop reason: HOSPADM

## 2017-01-01 RX ORDER — METOPROLOL TARTRATE 50 MG/1
50 TABLET ORAL EVERY 12 HOURS
Qty: 60 TAB | Refills: 0 | Status: SHIPPED | OUTPATIENT
Start: 2017-01-01 | End: 2017-01-01

## 2017-01-01 RX ORDER — VANCOMYCIN HYDROCHLORIDE
1250 EVERY 12 HOURS
Status: DISCONTINUED | OUTPATIENT
Start: 2017-01-01 | End: 2017-01-01 | Stop reason: HOSPADM

## 2017-01-01 RX ORDER — HEPARIN 100 UNIT/ML
300-500 SYRINGE INTRAVENOUS AS NEEDED
Status: DISPENSED | OUTPATIENT
Start: 2017-01-01 | End: 2017-01-01

## 2017-01-01 RX ORDER — METOPROLOL TARTRATE 25 MG/1
12.5 TABLET, FILM COATED ORAL EVERY 12 HOURS
Status: DISCONTINUED | OUTPATIENT
Start: 2017-01-01 | End: 2017-01-01

## 2017-01-01 RX ORDER — SODIUM CHLORIDE 0.9 % (FLUSH) 0.9 %
10 SYRINGE (ML) INJECTION
Status: DISCONTINUED | OUTPATIENT
Start: 2017-01-01 | End: 2017-01-01 | Stop reason: HOSPADM

## 2017-01-01 RX ORDER — CIPROFLOXACIN 500 MG/1
500 TABLET ORAL 2 TIMES DAILY
Qty: 8 TAB | Refills: 0 | Status: SHIPPED | OUTPATIENT
Start: 2017-01-01 | End: 2017-01-01

## 2017-01-01 RX ORDER — SODIUM CHLORIDE 0.9 % (FLUSH) 0.9 %
10-20 SYRINGE (ML) INJECTION AS NEEDED
Status: DISCONTINUED | OUTPATIENT
Start: 2017-01-01 | End: 2017-01-01 | Stop reason: HOSPADM

## 2017-01-01 RX ORDER — NALOXONE HYDROCHLORIDE 0.4 MG/ML
0.04 INJECTION, SOLUTION INTRAMUSCULAR; INTRAVENOUS; SUBCUTANEOUS AS NEEDED
Status: DISCONTINUED | OUTPATIENT
Start: 2017-01-01 | End: 2017-01-01 | Stop reason: HOSPADM

## 2017-01-01 RX ORDER — HYDROMORPHONE HCL IN 0.9% NACL 50 MG/50ML
1 PLASTIC BAG, INJECTION (ML) INJECTION
Status: DISCONTINUED | OUTPATIENT
Start: 2017-01-01 | End: 2017-01-01

## 2017-01-01 RX ORDER — MIRTAZAPINE 30 MG/1
30 TABLET, FILM COATED ORAL
Status: DISCONTINUED | OUTPATIENT
Start: 2017-01-01 | End: 2017-01-01 | Stop reason: HOSPADM

## 2017-01-01 RX ORDER — SODIUM CHLORIDE 9 MG/ML
999 INJECTION, SOLUTION INTRAVENOUS ONCE
Status: COMPLETED | OUTPATIENT
Start: 2017-01-01 | End: 2017-01-01

## 2017-01-01 RX ORDER — DOCUSATE SODIUM 100 MG/1
100 CAPSULE, LIQUID FILLED ORAL 2 TIMES DAILY
Qty: 60 CAP | Refills: 0 | Status: SHIPPED | OUTPATIENT
Start: 2017-01-01

## 2017-01-01 RX ORDER — SODIUM CHLORIDE, SODIUM LACTATE, POTASSIUM CHLORIDE, CALCIUM CHLORIDE 600; 310; 30; 20 MG/100ML; MG/100ML; MG/100ML; MG/100ML
75 INJECTION, SOLUTION INTRAVENOUS CONTINUOUS
Status: DISCONTINUED | OUTPATIENT
Start: 2017-01-01 | End: 2017-01-01

## 2017-01-01 RX ORDER — LORAZEPAM 2 MG/ML
1 INJECTION INTRAMUSCULAR ONCE
Status: COMPLETED | OUTPATIENT
Start: 2017-01-01 | End: 2017-01-01

## 2017-01-01 RX ORDER — ENOXAPARIN SODIUM 100 MG/ML
40 INJECTION SUBCUTANEOUS EVERY 24 HOURS
Qty: 8.4 ML | Refills: 0 | Status: SHIPPED | OUTPATIENT
Start: 2017-01-01 | End: 2017-01-01

## 2017-01-01 RX ORDER — MORPHINE SULFATE 2 MG/ML
1-2 INJECTION, SOLUTION INTRAMUSCULAR; INTRAVENOUS
Status: DISCONTINUED | OUTPATIENT
Start: 2017-01-01 | End: 2017-01-01 | Stop reason: SDUPTHER

## 2017-01-01 RX ORDER — OXYCODONE HCL 20 MG/1
20 TABLET, FILM COATED, EXTENDED RELEASE ORAL EVERY 12 HOURS
Qty: 30 TAB | Refills: 0 | Status: SHIPPED
Start: 2017-01-01 | End: 2017-01-01 | Stop reason: SDUPTHER

## 2017-01-01 RX ORDER — HEPARIN 100 UNIT/ML
300-500 SYRINGE INTRAVENOUS AS NEEDED
Status: DISCONTINUED | OUTPATIENT
Start: 2017-01-01 | End: 2017-01-01 | Stop reason: HOSPADM

## 2017-01-01 RX ORDER — ONDANSETRON 2 MG/ML
INJECTION INTRAMUSCULAR; INTRAVENOUS
Status: COMPLETED
Start: 2017-01-01 | End: 2017-01-01

## 2017-01-01 RX ORDER — MORPHINE SULFATE 4 MG/ML
1 INJECTION, SOLUTION INTRAMUSCULAR; INTRAVENOUS
Status: DISCONTINUED | OUTPATIENT
Start: 2017-01-01 | End: 2017-01-01 | Stop reason: HOSPADM

## 2017-01-01 RX ORDER — HEPARIN 100 UNIT/ML
300 SYRINGE INTRAVENOUS AS NEEDED
Status: DISPENSED | OUTPATIENT
Start: 2017-01-01 | End: 2017-01-01

## 2017-01-01 RX ORDER — PREDNISONE 10 MG/1
10 TABLET ORAL DAILY
Qty: 30 TAB | Refills: 0 | Status: SHIPPED | OUTPATIENT
Start: 2017-01-01 | End: 2017-01-01 | Stop reason: ALTCHOICE

## 2017-01-01 RX ORDER — DIPHENHYDRAMINE HYDROCHLORIDE 50 MG/ML
25 INJECTION, SOLUTION INTRAMUSCULAR; INTRAVENOUS
Status: COMPLETED | OUTPATIENT
Start: 2017-01-01 | End: 2017-01-01

## 2017-01-01 RX ORDER — FENTANYL CITRATE 50 UG/ML
INJECTION, SOLUTION INTRAMUSCULAR; INTRAVENOUS AS NEEDED
Status: DISCONTINUED | OUTPATIENT
Start: 2017-01-01 | End: 2017-01-01 | Stop reason: HOSPADM

## 2017-01-01 RX ORDER — LIDOCAINE AND PRILOCAINE 25; 25 MG/G; MG/G
CREAM TOPICAL AS NEEDED
Status: DISCONTINUED | OUTPATIENT
Start: 2017-01-01 | End: 2017-01-01 | Stop reason: HOSPADM

## 2017-01-01 RX ORDER — CHLORDIAZEPOXIDE HYDROCHLORIDE 5 MG/1
5 CAPSULE, GELATIN COATED ORAL 3 TIMES DAILY
Status: DISCONTINUED | OUTPATIENT
Start: 2017-01-01 | End: 2017-01-01

## 2017-01-01 RX ORDER — DRONABINOL 2.5 MG/1
5 CAPSULE ORAL
Status: DISCONTINUED | OUTPATIENT
Start: 2017-01-01 | End: 2017-01-01 | Stop reason: HOSPADM

## 2017-01-01 RX ORDER — DEXTROSE, SODIUM CHLORIDE, AND POTASSIUM CHLORIDE 5; .45; .15 G/100ML; G/100ML; G/100ML
50 INJECTION INTRAVENOUS CONTINUOUS
Status: DISCONTINUED | OUTPATIENT
Start: 2017-01-01 | End: 2017-01-01

## 2017-01-01 RX ORDER — DRONABINOL 5 MG/1
5 CAPSULE ORAL
Status: DISCONTINUED | OUTPATIENT
Start: 2017-01-01 | End: 2017-01-01 | Stop reason: HOSPADM

## 2017-01-01 RX ORDER — METOCLOPRAMIDE HYDROCHLORIDE 5 MG/ML
10 INJECTION INTRAMUSCULAR; INTRAVENOUS EVERY 6 HOURS
Status: DISPENSED | OUTPATIENT
Start: 2017-01-01 | End: 2017-01-01

## 2017-01-01 RX ORDER — DEXAMETHASONE 2 MG/1
4 TABLET ORAL EVERY 6 HOURS
Qty: 240 TAB | Refills: 2 | Status: SHIPPED | OUTPATIENT
Start: 2017-01-01

## 2017-01-01 RX ORDER — ALPRAZOLAM 0.5 MG/1
2 TABLET ORAL
Status: DISCONTINUED | OUTPATIENT
Start: 2017-01-01 | End: 2017-01-01 | Stop reason: HOSPADM

## 2017-01-01 RX ORDER — NALOXONE HYDROCHLORIDE 0.4 MG/ML
0.1 INJECTION, SOLUTION INTRAMUSCULAR; INTRAVENOUS; SUBCUTANEOUS
Status: DISCONTINUED | OUTPATIENT
Start: 2017-01-01 | End: 2017-01-01 | Stop reason: HOSPADM

## 2017-01-01 RX ORDER — HYDROMORPHONE HYDROCHLORIDE 1 MG/ML
2 INJECTION, SOLUTION INTRAMUSCULAR; INTRAVENOUS; SUBCUTANEOUS
Status: DISCONTINUED | OUTPATIENT
Start: 2017-01-01 | End: 2017-01-01

## 2017-01-01 RX ORDER — SODIUM CHLORIDE 0.9 % (FLUSH) 0.9 %
5-10 SYRINGE (ML) INJECTION AS NEEDED
Status: DISCONTINUED | OUTPATIENT
Start: 2017-01-01 | End: 2017-01-01 | Stop reason: SDUPTHER

## 2017-01-01 RX ORDER — HYDROMORPHONE HYDROCHLORIDE 1 MG/ML
1 INJECTION, SOLUTION INTRAMUSCULAR; INTRAVENOUS; SUBCUTANEOUS
Status: DISCONTINUED | OUTPATIENT
Start: 2017-01-01 | End: 2017-01-01

## 2017-01-01 RX ORDER — HEPARIN 100 UNIT/ML
300 SYRINGE INTRAVENOUS ONCE
Status: DISCONTINUED | OUTPATIENT
Start: 2017-01-01 | End: 2017-01-01 | Stop reason: HOSPADM

## 2017-01-01 RX ORDER — DOCUSATE SODIUM 100 MG/1
100 CAPSULE, LIQUID FILLED ORAL 2 TIMES DAILY
Status: DISCONTINUED | OUTPATIENT
Start: 2017-01-01 | End: 2017-01-01 | Stop reason: HOSPADM

## 2017-01-01 RX ORDER — TEMAZEPAM 15 MG/1
15 CAPSULE ORAL
Status: DISCONTINUED | OUTPATIENT
Start: 2017-01-01 | End: 2017-01-01

## 2017-01-01 RX ORDER — MORPHINE SULFATE 2 MG/ML
2 INJECTION, SOLUTION INTRAMUSCULAR; INTRAVENOUS
Status: DISCONTINUED | OUTPATIENT
Start: 2017-01-01 | End: 2017-01-01 | Stop reason: HOSPADM

## 2017-01-01 RX ORDER — PAROXETINE HYDROCHLORIDE 20 MG/1
40 TABLET, FILM COATED ORAL
Status: DISCONTINUED | OUTPATIENT
Start: 2017-01-01 | End: 2017-01-01 | Stop reason: HOSPADM

## 2017-01-01 RX ORDER — HYDROMORPHONE HYDROCHLORIDE 4 MG/1
4-6 TABLET ORAL
Status: DISCONTINUED | OUTPATIENT
Start: 2017-01-01 | End: 2017-01-01 | Stop reason: HOSPADM

## 2017-01-01 RX ORDER — FLUMAZENIL 0.1 MG/ML
0.2 INJECTION INTRAVENOUS AS NEEDED
Status: DISCONTINUED | OUTPATIENT
Start: 2017-01-01 | End: 2017-01-01 | Stop reason: HOSPADM

## 2017-01-01 RX ADMIN — DEXAMETHASONE SODIUM PHOSPHATE 10 MG: 10 INJECTION INTRAMUSCULAR; INTRAVENOUS at 10:23

## 2017-01-01 RX ADMIN — METOPROLOL TARTRATE 50 MG: 50 TABLET ORAL at 16:09

## 2017-01-01 RX ADMIN — ESMOLOL HYDROCHLORIDE 10 MG: 10 INJECTION INTRAVENOUS at 09:56

## 2017-01-01 RX ADMIN — POTASSIUM CHLORIDE 20 MEQ: 20 TABLET, EXTENDED RELEASE ORAL at 07:42

## 2017-01-01 RX ADMIN — IRON DEXTRAN 25 MG: 50 INJECTION INTRAMUSCULAR; INTRAVENOUS at 20:32

## 2017-01-01 RX ADMIN — LORAZEPAM 1 MG: 2 INJECTION INTRAMUSCULAR; INTRAVENOUS at 16:14

## 2017-01-01 RX ADMIN — Medication 10 ML: at 07:43

## 2017-01-01 RX ADMIN — HYDROMORPHONE HYDROCHLORIDE 6 MG: 4 TABLET ORAL at 22:09

## 2017-01-01 RX ADMIN — CYCLOBENZAPRINE HYDROCHLORIDE 10 MG: 10 TABLET, FILM COATED ORAL at 08:29

## 2017-01-01 RX ADMIN — PAROXETINE HYDROCHLORIDE 40 MG: 20 TABLET, FILM COATED ORAL at 08:14

## 2017-01-01 RX ADMIN — IOPAMIDOL 100 ML: 755 INJECTION, SOLUTION INTRAVENOUS at 10:38

## 2017-01-01 RX ADMIN — GABAPENTIN 600 MG: 300 CAPSULE ORAL at 07:33

## 2017-01-01 RX ADMIN — DEXTROSE MONOHYDRATE AND SODIUM CHLORIDE 75 ML/HR: 5; .9 INJECTION, SOLUTION INTRAVENOUS at 10:08

## 2017-01-01 RX ADMIN — DEXTROSE MONOHYDRATE AND SODIUM CHLORIDE 75 ML/HR: 5; .9 INJECTION, SOLUTION INTRAVENOUS at 06:35

## 2017-01-01 RX ADMIN — ONDANSETRON 4 MG: 2 INJECTION INTRAMUSCULAR; INTRAVENOUS at 10:28

## 2017-01-01 RX ADMIN — LORAZEPAM 1 MG: 1 TABLET ORAL at 21:35

## 2017-01-01 RX ADMIN — HYDROCODONE BITARTRATE AND ACETAMINOPHEN 1 TABLET: 5; 325 TABLET ORAL at 16:54

## 2017-01-01 RX ADMIN — DEXTROSE MONOHYDRATE 25 ML/HR: 5 INJECTION, SOLUTION INTRAVENOUS at 09:12

## 2017-01-01 RX ADMIN — HEPARIN SODIUM 5000 UNITS: 5000 INJECTION, SOLUTION INTRAVENOUS; SUBCUTANEOUS at 21:50

## 2017-01-01 RX ADMIN — OXYCODONE HYDROCHLORIDE AND ACETAMINOPHEN 1 TABLET: 10; 325 TABLET ORAL at 12:08

## 2017-01-01 RX ADMIN — SODIUM CHLORIDE, SODIUM LACTATE, POTASSIUM CHLORIDE, AND CALCIUM CHLORIDE 75 ML/HR: 600; 310; 30; 20 INJECTION, SOLUTION INTRAVENOUS at 16:27

## 2017-01-01 RX ADMIN — SODIUM CHLORIDE 1000 ML: 900 INJECTION, SOLUTION INTRAVENOUS at 19:15

## 2017-01-01 RX ADMIN — OXYCODONE HYDROCHLORIDE 10 MG: 10 TABLET, FILM COATED, EXTENDED RELEASE ORAL at 17:27

## 2017-01-01 RX ADMIN — Medication 2 MG: at 16:42

## 2017-01-01 RX ADMIN — OXYCODONE HYDROCHLORIDE AND ACETAMINOPHEN 1 TABLET: 10; 325 TABLET ORAL at 01:56

## 2017-01-01 RX ADMIN — VANCOMYCIN HYDROCHLORIDE 1250 MG: 10 INJECTION, POWDER, LYOPHILIZED, FOR SOLUTION INTRAVENOUS at 01:15

## 2017-01-01 RX ADMIN — SODIUM CHLORIDE, PRESERVATIVE FREE 500 UNITS: 5 INJECTION INTRAVENOUS at 13:49

## 2017-01-01 RX ADMIN — LEVOFLOXACIN 750 MG: 5 INJECTION, SOLUTION INTRAVENOUS at 17:51

## 2017-01-01 RX ADMIN — MAGNESIUM HYDROXIDE 30 ML: 400 SUSPENSION ORAL at 17:16

## 2017-01-01 RX ADMIN — DEXAMETHASONE SODIUM PHOSPHATE 4 MG: 4 INJECTION, SOLUTION INTRAMUSCULAR; INTRAVENOUS at 11:00

## 2017-01-01 RX ADMIN — PIPERACILLIN SODIUM,TAZOBACTAM SODIUM 4.5 G: 4; .5 INJECTION, POWDER, FOR SOLUTION INTRAVENOUS at 22:29

## 2017-01-01 RX ADMIN — DEXAMETHASONE SODIUM PHOSPHATE 4 MG: 4 INJECTION, SOLUTION INTRAMUSCULAR; INTRAVENOUS at 16:09

## 2017-01-01 RX ADMIN — MAGNESIUM HYDROXIDE 30 ML: 400 SUSPENSION ORAL at 17:51

## 2017-01-01 RX ADMIN — METOPROLOL TARTRATE 50 MG: 50 TABLET ORAL at 09:24

## 2017-01-01 RX ADMIN — FENTANYL CITRATE 100 MCG: 50 INJECTION, SOLUTION INTRAMUSCULAR; INTRAVENOUS at 07:56

## 2017-01-01 RX ADMIN — CYCLOBENZAPRINE HYDROCHLORIDE 10 MG: 10 TABLET, FILM COATED ORAL at 08:35

## 2017-01-01 RX ADMIN — BEVACIZUMAB 412 MG: 400 INJECTION, SOLUTION INTRAVENOUS at 11:15

## 2017-01-01 RX ADMIN — DEXTROSE MONOHYDRATE 25 ML/HR: 5 INJECTION, SOLUTION INTRAVENOUS at 12:05

## 2017-01-01 RX ADMIN — ONDANSETRON 4 MG: 2 INJECTION INTRAMUSCULAR; INTRAVENOUS at 14:15

## 2017-01-01 RX ADMIN — POTASSIUM CHLORIDE 20 MEQ: 400 INJECTION, SOLUTION INTRAVENOUS at 15:55

## 2017-01-01 RX ADMIN — POTASSIUM CHLORIDE 20 MEQ: 14.9 INJECTION, SOLUTION INTRAVENOUS at 09:24

## 2017-01-01 RX ADMIN — SODIUM CHLORIDE 500 ML: 900 INJECTION, SOLUTION INTRAVENOUS at 10:15

## 2017-01-01 RX ADMIN — DEXAMETHASONE SODIUM PHOSPHATE 4 MG: 4 INJECTION, SOLUTION INTRAMUSCULAR; INTRAVENOUS at 23:44

## 2017-01-01 RX ADMIN — SODIUM CHLORIDE 100 ML/HR: 900 INJECTION, SOLUTION INTRAVENOUS at 10:00

## 2017-01-01 RX ADMIN — OXYCODONE HYDROCHLORIDE 20 MG: 20 TABLET, FILM COATED, EXTENDED RELEASE ORAL at 16:08

## 2017-01-01 RX ADMIN — DEXAMETHASONE SODIUM PHOSPHATE 10 MG: 10 INJECTION INTRAMUSCULAR; INTRAVENOUS at 09:37

## 2017-01-01 RX ADMIN — Medication 10 ML: at 15:20

## 2017-01-01 RX ADMIN — PIPERACILLIN SODIUM,TAZOBACTAM SODIUM 4.5 G: 4; .5 INJECTION, POWDER, FOR SOLUTION INTRAVENOUS at 03:51

## 2017-01-01 RX ADMIN — HYDROMORPHONE HYDROCHLORIDE 0.2 MG: 2 INJECTION, SOLUTION INTRAMUSCULAR; INTRAVENOUS; SUBCUTANEOUS at 11:55

## 2017-01-01 RX ADMIN — HEPARIN SODIUM 5000 UNITS: 5000 INJECTION, SOLUTION INTRAVENOUS; SUBCUTANEOUS at 04:35

## 2017-01-01 RX ADMIN — SODIUM CHLORIDE 100 ML/HR: 900 INJECTION, SOLUTION INTRAVENOUS at 16:39

## 2017-01-01 RX ADMIN — Medication 300 UNITS: at 11:25

## 2017-01-01 RX ADMIN — METOPROLOL TARTRATE 50 MG: 50 TABLET ORAL at 21:52

## 2017-01-01 RX ADMIN — DEXTROSE MONOHYDRATE, SODIUM CHLORIDE, AND POTASSIUM CHLORIDE 50 ML/HR: 50; 4.5; 1.49 INJECTION, SOLUTION INTRAVENOUS at 11:16

## 2017-01-01 RX ADMIN — OXYCODONE HYDROCHLORIDE AND ACETAMINOPHEN 2 TABLET: 5; 325 TABLET ORAL at 10:57

## 2017-01-01 RX ADMIN — HYDROMORPHONE HYDROCHLORIDE 0.5 MG: 1 INJECTION, SOLUTION INTRAMUSCULAR; INTRAVENOUS; SUBCUTANEOUS at 07:20

## 2017-01-01 RX ADMIN — METOPROLOL TARTRATE 50 MG: 50 TABLET ORAL at 05:13

## 2017-01-01 RX ADMIN — HYDROMORPHONE HYDROCHLORIDE 2 MG: 1 INJECTION, SOLUTION INTRAMUSCULAR; INTRAVENOUS; SUBCUTANEOUS at 23:57

## 2017-01-01 RX ADMIN — HYDROMORPHONE HYDROCHLORIDE 1 MG: 1 INJECTION, SOLUTION INTRAMUSCULAR; INTRAVENOUS; SUBCUTANEOUS at 04:20

## 2017-01-01 RX ADMIN — FAMOTIDINE 20 MG: 10 INJECTION, SOLUTION INTRAVENOUS at 09:04

## 2017-01-01 RX ADMIN — HYDROCODONE BITARTRATE AND ACETAMINOPHEN 2 TABLET: 5; 325 TABLET ORAL at 08:25

## 2017-01-01 RX ADMIN — INSULIN LISPRO 2 UNITS: 100 INJECTION, SOLUTION INTRAVENOUS; SUBCUTANEOUS at 21:34

## 2017-01-01 RX ADMIN — PAROXETINE HYDROCHLORIDE 40 MG: 20 TABLET, FILM COATED ORAL at 08:35

## 2017-01-01 RX ADMIN — DRONABINOL 5 MG: 2.5 CAPSULE ORAL at 09:52

## 2017-01-01 RX ADMIN — HYDROMORPHONE HYDROCHLORIDE 1 MG: 1 INJECTION, SOLUTION INTRAMUSCULAR; INTRAVENOUS; SUBCUTANEOUS at 03:01

## 2017-01-01 RX ADMIN — PAROXETINE HYDROCHLORIDE 40 MG: 20 TABLET, FILM COATED ORAL at 09:52

## 2017-01-01 RX ADMIN — HYDROMORPHONE HYDROCHLORIDE: 2 INJECTION INTRAMUSCULAR; INTRAVENOUS; SUBCUTANEOUS at 07:12

## 2017-01-01 RX ADMIN — HYDROCODONE BITARTRATE AND ACETAMINOPHEN 1 TABLET: 5; 325 TABLET ORAL at 09:14

## 2017-01-01 RX ADMIN — HYDROMORPHONE HYDROCHLORIDE 1 MG: 1 INJECTION, SOLUTION INTRAMUSCULAR; INTRAVENOUS; SUBCUTANEOUS at 00:09

## 2017-01-01 RX ADMIN — PANTOPRAZOLE SODIUM 40 MG: 40 TABLET, DELAYED RELEASE ORAL at 08:17

## 2017-01-01 RX ADMIN — Medication 10 ML: at 05:31

## 2017-01-01 RX ADMIN — HYDROCODONE BITARTRATE AND ACETAMINOPHEN 2 TABLET: 5; 325 TABLET ORAL at 12:20

## 2017-01-01 RX ADMIN — SODIUM CHLORIDE, PRESERVATIVE FREE 500 UNITS: 5 INJECTION INTRAVENOUS at 14:15

## 2017-01-01 RX ADMIN — OXYCODONE HYDROCHLORIDE 20 MG: 20 TABLET, FILM COATED, EXTENDED RELEASE ORAL at 17:17

## 2017-01-01 RX ADMIN — SODIUM CHLORIDE, PRESERVATIVE FREE 500 UNITS: 5 INJECTION INTRAVENOUS at 16:06

## 2017-01-01 RX ADMIN — METOPROLOL TARTRATE 2.5 MG: 5 INJECTION INTRAVENOUS at 14:27

## 2017-01-01 RX ADMIN — CHLORDIAZEPOXIDE HYDROCHLORIDE 5 MG: 5 CAPSULE ORAL at 22:00

## 2017-01-01 RX ADMIN — SODIUM CHLORIDE 500 ML: 900 INJECTION, SOLUTION INTRAVENOUS at 10:51

## 2017-01-01 RX ADMIN — VANCOMYCIN HYDROCHLORIDE 1250 MG: 10 INJECTION, POWDER, LYOPHILIZED, FOR SOLUTION INTRAVENOUS at 16:11

## 2017-01-01 RX ADMIN — DOCUSATE SODIUM 100 MG: 100 CAPSULE, LIQUID FILLED ORAL at 08:07

## 2017-01-01 RX ADMIN — LEVOFLOXACIN 750 MG: 500 TABLET, FILM COATED ORAL at 18:00

## 2017-01-01 RX ADMIN — PIPERACILLIN SODIUM,TAZOBACTAM SODIUM 4.5 G: 4; .5 INJECTION, POWDER, FOR SOLUTION INTRAVENOUS at 20:20

## 2017-01-01 RX ADMIN — SODIUM CHLORIDE 500 ML: 900 INJECTION, SOLUTION INTRAVENOUS at 07:45

## 2017-01-01 RX ADMIN — OXYCODONE HYDROCHLORIDE AND ACETAMINOPHEN 1 TABLET: 10; 325 TABLET ORAL at 04:08

## 2017-01-01 RX ADMIN — SODIUM CHLORIDE, PRESERVATIVE FREE 500 UNITS: 5 INJECTION INTRAVENOUS at 15:50

## 2017-01-01 RX ADMIN — HYDROMORPHONE HYDROCHLORIDE 1 MG: 1 INJECTION, SOLUTION INTRAMUSCULAR; INTRAVENOUS; SUBCUTANEOUS at 18:20

## 2017-01-01 RX ADMIN — CYCLOBENZAPRINE HYDROCHLORIDE 10 MG: 10 TABLET, FILM COATED ORAL at 08:40

## 2017-01-01 RX ADMIN — PAROXETINE HYDROCHLORIDE 40 MG: 20 TABLET, FILM COATED ORAL at 09:16

## 2017-01-01 RX ADMIN — INSULIN LISPRO 2 UNITS: 100 INJECTION, SOLUTION INTRAVENOUS; SUBCUTANEOUS at 21:48

## 2017-01-01 RX ADMIN — DEXAMETHASONE SODIUM PHOSPHATE 4 MG: 4 INJECTION, SOLUTION INTRAMUSCULAR; INTRAVENOUS at 00:09

## 2017-01-01 RX ADMIN — BEVACIZUMAB 381 MG: 400 INJECTION, SOLUTION INTRAVENOUS at 08:49

## 2017-01-01 RX ADMIN — HYDROMORPHONE HYDROCHLORIDE: 2 INJECTION INTRAMUSCULAR; INTRAVENOUS; SUBCUTANEOUS at 16:09

## 2017-01-01 RX ADMIN — SODIUM CHLORIDE 2040 ML: 900 INJECTION, SOLUTION INTRAVENOUS at 18:00

## 2017-01-01 RX ADMIN — Medication 10 ML: at 13:09

## 2017-01-01 RX ADMIN — HYDROCODONE BITARTRATE AND ACETAMINOPHEN 1 TABLET: 5; 325 TABLET ORAL at 04:23

## 2017-01-01 RX ADMIN — HYDROCODONE BITARTRATE AND ACETAMINOPHEN 1 TABLET: 5; 325 TABLET ORAL at 03:07

## 2017-01-01 RX ADMIN — MAGNESIUM HYDROXIDE 30 ML: 400 SUSPENSION ORAL at 08:29

## 2017-01-01 RX ADMIN — PANTOPRAZOLE SODIUM 40 MG: 40 TABLET, DELAYED RELEASE ORAL at 06:38

## 2017-01-01 RX ADMIN — PIPERACILLIN SODIUM,TAZOBACTAM SODIUM 4.5 G: 4; .5 INJECTION, POWDER, FOR SOLUTION INTRAVENOUS at 05:58

## 2017-01-01 RX ADMIN — HYDROMORPHONE HYDROCHLORIDE 1 MG: 1 INJECTION, SOLUTION INTRAMUSCULAR; INTRAVENOUS; SUBCUTANEOUS at 23:48

## 2017-01-01 RX ADMIN — Medication 10 ML: at 07:28

## 2017-01-01 RX ADMIN — DEXAMETHASONE SODIUM PHOSPHATE 4 MG: 4 INJECTION, SOLUTION INTRAMUSCULAR; INTRAVENOUS at 22:58

## 2017-01-01 RX ADMIN — HEPARIN SODIUM 5000 UNITS: 5000 INJECTION, SOLUTION INTRAVENOUS; SUBCUTANEOUS at 12:38

## 2017-01-01 RX ADMIN — METOPROLOL TARTRATE 50 MG: 50 TABLET ORAL at 16:44

## 2017-01-01 RX ADMIN — OXYCODONE HYDROCHLORIDE AND ACETAMINOPHEN 1 TABLET: 10; 325 TABLET ORAL at 16:44

## 2017-01-01 RX ADMIN — HYDROMORPHONE HYDROCHLORIDE 2 MG: 1 INJECTION, SOLUTION INTRAMUSCULAR; INTRAVENOUS; SUBCUTANEOUS at 16:39

## 2017-01-01 RX ADMIN — DEXAMETHASONE SODIUM PHOSPHATE 4 MG: 4 INJECTION, SOLUTION INTRAMUSCULAR; INTRAVENOUS at 17:16

## 2017-01-01 RX ADMIN — HYDROMORPHONE HYDROCHLORIDE 1 MG: 1 INJECTION, SOLUTION INTRAMUSCULAR; INTRAVENOUS; SUBCUTANEOUS at 12:37

## 2017-01-01 RX ADMIN — PIPERACILLIN SODIUM,TAZOBACTAM SODIUM 4.5 G: 4; .5 INJECTION, POWDER, FOR SOLUTION INTRAVENOUS at 14:43

## 2017-01-01 RX ADMIN — OXYCODONE HYDROCHLORIDE AND ACETAMINOPHEN 1 TABLET: 10; 325 TABLET ORAL at 08:03

## 2017-01-01 RX ADMIN — POTASSIUM CHLORIDE 20 MEQ: 20 TABLET, EXTENDED RELEASE ORAL at 21:21

## 2017-01-01 RX ADMIN — Medication 10 ML: at 21:58

## 2017-01-01 RX ADMIN — OXALIPLATIN 136 MG: 5 INJECTION, SOLUTION, CONCENTRATE INTRAVENOUS at 12:00

## 2017-01-01 RX ADMIN — SODIUM CHLORIDE 500 ML: 900 INJECTION, SOLUTION INTRAVENOUS at 09:09

## 2017-01-01 RX ADMIN — MORPHINE SULFATE 4 MG: 4 INJECTION, SOLUTION INTRAMUSCULAR; INTRAVENOUS at 19:32

## 2017-01-01 RX ADMIN — SODIUM CITRATE AND CITRIC ACID MONOHYDRATE 30 ML: 500; 334 SOLUTION ORAL at 13:45

## 2017-01-01 RX ADMIN — SODIUM CHLORIDE 500 ML: 900 INJECTION, SOLUTION INTRAVENOUS at 08:30

## 2017-01-01 RX ADMIN — DOCUSATE SODIUM 100 MG: 100 CAPSULE, LIQUID FILLED ORAL at 17:32

## 2017-01-01 RX ADMIN — CHLORDIAZEPOXIDE HYDROCHLORIDE 5 MG: 5 CAPSULE ORAL at 08:07

## 2017-01-01 RX ADMIN — DIPHENHYDRAMINE HYDROCHLORIDE 25 MG: 50 INJECTION, SOLUTION INTRAMUSCULAR; INTRAVENOUS at 09:04

## 2017-01-01 RX ADMIN — LABETALOL HYDROCHLORIDE 2.5 MG: 5 INJECTION, SOLUTION INTRAVENOUS at 08:33

## 2017-01-01 RX ADMIN — ONDANSETRON 8 MG: 2 INJECTION INTRAMUSCULAR; INTRAVENOUS at 08:53

## 2017-01-01 RX ADMIN — PIPERACILLIN SODIUM,TAZOBACTAM SODIUM 4.5 G: 4; .5 INJECTION, POWDER, FOR SOLUTION INTRAVENOUS at 18:00

## 2017-01-01 RX ADMIN — DEXTROSE MONOHYDRATE 25 ML/HR: 5 INJECTION, SOLUTION INTRAVENOUS at 10:30

## 2017-01-01 RX ADMIN — GADOBENATE DIMEGLUMINE 13 ML: 529 INJECTION, SOLUTION INTRAVENOUS at 14:14

## 2017-01-01 RX ADMIN — Medication 10 ML: at 05:45

## 2017-01-01 RX ADMIN — OXYCODONE HYDROCHLORIDE AND ACETAMINOPHEN 2 TABLET: 5; 325 TABLET ORAL at 19:25

## 2017-01-01 RX ADMIN — SODIUM CHLORIDE 500 ML: 900 INJECTION, SOLUTION INTRAVENOUS at 07:28

## 2017-01-01 RX ADMIN — VANCOMYCIN HYDROCHLORIDE 1250 MG: 10 INJECTION, POWDER, LYOPHILIZED, FOR SOLUTION INTRAVENOUS at 16:34

## 2017-01-01 RX ADMIN — LEVETIRACETAM 1000 MG: 100 INJECTION, SOLUTION INTRAVENOUS at 22:46

## 2017-01-01 RX ADMIN — LIDOCAINE HYDROCHLORIDE 60 MG: 20 INJECTION, SOLUTION EPIDURAL; INFILTRATION; INTRACAUDAL; PERINEURAL at 07:56

## 2017-01-01 RX ADMIN — LORAZEPAM 1 MG: 2 INJECTION INTRAMUSCULAR; INTRAVENOUS at 21:52

## 2017-01-01 RX ADMIN — CHLORDIAZEPOXIDE HYDROCHLORIDE 5 MG: 5 CAPSULE ORAL at 09:58

## 2017-01-01 RX ADMIN — POTASSIUM CHLORIDE 20 MEQ: 14.9 INJECTION, SOLUTION INTRAVENOUS at 13:11

## 2017-01-01 RX ADMIN — HYDROMORPHONE HYDROCHLORIDE 0.5 MG: 1 INJECTION, SOLUTION INTRAMUSCULAR; INTRAVENOUS; SUBCUTANEOUS at 11:49

## 2017-01-01 RX ADMIN — HYDROCODONE BITARTRATE AND ACETAMINOPHEN 1 TABLET: 5; 325 TABLET ORAL at 15:24

## 2017-01-01 RX ADMIN — Medication 20 ML: at 15:24

## 2017-01-01 RX ADMIN — INSULIN LISPRO 2 UNITS: 100 INJECTION, SOLUTION INTRAVENOUS; SUBCUTANEOUS at 11:57

## 2017-01-01 RX ADMIN — Medication 10 ML: at 21:54

## 2017-01-01 RX ADMIN — ROCURONIUM BROMIDE 10 MG: 10 INJECTION, SOLUTION INTRAVENOUS at 10:37

## 2017-01-01 RX ADMIN — Medication 10 ML: at 11:49

## 2017-01-01 RX ADMIN — POTASSIUM CHLORIDE 40 MEQ: 29.8 INJECTION, SOLUTION INTRAVENOUS at 18:21

## 2017-01-01 RX ADMIN — Medication 10 ML: at 23:41

## 2017-01-01 RX ADMIN — LEVETIRACETAM 1000 MG: 100 INJECTION, SOLUTION INTRAVENOUS at 21:53

## 2017-01-01 RX ADMIN — DRONABINOL 5 MG: 5 CAPSULE ORAL at 08:38

## 2017-01-01 RX ADMIN — PIPERACILLIN SODIUM,TAZOBACTAM SODIUM 3.38 G: 3; .375 INJECTION, POWDER, FOR SOLUTION INTRAVENOUS at 17:02

## 2017-01-01 RX ADMIN — HYDROCODONE BITARTRATE AND ACETAMINOPHEN 1 TABLET: 5; 325 TABLET ORAL at 20:30

## 2017-01-01 RX ADMIN — PIPERACILLIN SODIUM,TAZOBACTAM SODIUM 4.5 G: 4; .5 INJECTION, POWDER, FOR SOLUTION INTRAVENOUS at 05:56

## 2017-01-01 RX ADMIN — DOCUSATE SODIUM 100 MG: 100 CAPSULE, LIQUID FILLED ORAL at 09:58

## 2017-01-01 RX ADMIN — LEVETIRACETAM 1000 MG: 100 INJECTION, SOLUTION INTRAVENOUS at 10:12

## 2017-01-01 RX ADMIN — SODIUM CHLORIDE 100 ML: 900 INJECTION, SOLUTION INTRAVENOUS at 16:26

## 2017-01-01 RX ADMIN — ONDANSETRON 4 MG: 2 INJECTION INTRAMUSCULAR; INTRAVENOUS at 15:34

## 2017-01-01 RX ADMIN — MAGNESIUM HYDROXIDE 30 ML: 400 SUSPENSION ORAL at 19:21

## 2017-01-01 RX ADMIN — PIPERACILLIN SODIUM,TAZOBACTAM SODIUM 4.5 G: 4; .5 INJECTION, POWDER, FOR SOLUTION INTRAVENOUS at 21:53

## 2017-01-01 RX ADMIN — FAMOTIDINE 20 MG: 10 INJECTION, SOLUTION INTRAVENOUS at 21:54

## 2017-01-01 RX ADMIN — DEXAMETHASONE SODIUM PHOSPHATE 10 MG: 10 INJECTION INTRAMUSCULAR; INTRAVENOUS at 09:02

## 2017-01-01 RX ADMIN — DOCUSATE SODIUM 100 MG: 100 CAPSULE, LIQUID FILLED ORAL at 16:34

## 2017-01-01 RX ADMIN — Medication 10 ML: at 05:34

## 2017-01-01 RX ADMIN — OXYCODONE HYDROCHLORIDE 20 MG: 20 TABLET, FILM COATED, EXTENDED RELEASE ORAL at 09:36

## 2017-01-01 RX ADMIN — DEXTROSE MONOHYDRATE, SODIUM CHLORIDE, AND POTASSIUM CHLORIDE 25 ML/HR: 50; 4.5; 1.49 INJECTION, SOLUTION INTRAVENOUS at 00:30

## 2017-01-01 RX ADMIN — SODIUM CHLORIDE 500 ML: 900 INJECTION, SOLUTION INTRAVENOUS at 08:10

## 2017-01-01 RX ADMIN — VANCOMYCIN HYDROCHLORIDE 1250 MG: 10 INJECTION, POWDER, LYOPHILIZED, FOR SOLUTION INTRAVENOUS at 12:46

## 2017-01-01 RX ADMIN — INSULIN LISPRO 2 UNITS: 100 INJECTION, SOLUTION INTRAVENOUS; SUBCUTANEOUS at 17:17

## 2017-01-01 RX ADMIN — HYDROMORPHONE HYDROCHLORIDE 0.5 MG: 1 INJECTION, SOLUTION INTRAMUSCULAR; INTRAVENOUS; SUBCUTANEOUS at 11:27

## 2017-01-01 RX ADMIN — SODIUM CHLORIDE 40 MG: 9 INJECTION INTRAMUSCULAR; INTRAVENOUS; SUBCUTANEOUS at 09:00

## 2017-01-01 RX ADMIN — LEVOFLOXACIN 750 MG: 500 TABLET, FILM COATED ORAL at 17:28

## 2017-01-01 RX ADMIN — Medication 10 ML: at 21:51

## 2017-01-01 RX ADMIN — ROCURONIUM BROMIDE 10 MG: 10 INJECTION, SOLUTION INTRAVENOUS at 09:34

## 2017-01-01 RX ADMIN — SODIUM CHLORIDE 1000 ML: 900 INJECTION, SOLUTION INTRAVENOUS at 12:20

## 2017-01-01 RX ADMIN — ACETAMINOPHEN 650 MG: 325 TABLET, FILM COATED ORAL at 07:37

## 2017-01-01 RX ADMIN — POTASSIUM CHLORIDE 20 MEQ: 14.9 INJECTION, SOLUTION INTRAVENOUS at 07:00

## 2017-01-01 RX ADMIN — Medication 10 ML: at 14:45

## 2017-01-01 RX ADMIN — SUCCINYLCHOLINE CHLORIDE 130 MG: 20 INJECTION INTRAMUSCULAR; INTRAVENOUS at 13:52

## 2017-01-01 RX ADMIN — CYCLOBENZAPRINE HYDROCHLORIDE 10 MG: 10 TABLET, FILM COATED ORAL at 09:16

## 2017-01-01 RX ADMIN — MORPHINE SULFATE 2 MG: 4 INJECTION, SOLUTION INTRAMUSCULAR; INTRAVENOUS at 04:58

## 2017-01-01 RX ADMIN — HYDROCODONE BITARTRATE AND ACETAMINOPHEN 1 TABLET: 5; 325 TABLET ORAL at 08:14

## 2017-01-01 RX ADMIN — PAROXETINE HYDROCHLORIDE 40 MG: 20 TABLET, FILM COATED ORAL at 07:35

## 2017-01-01 RX ADMIN — OXYCODONE HYDROCHLORIDE AND ACETAMINOPHEN 2 TABLET: 5; 325 TABLET ORAL at 12:16

## 2017-01-01 RX ADMIN — BEVACIZUMAB 412 MG: 400 INJECTION, SOLUTION INTRAVENOUS at 09:59

## 2017-01-01 RX ADMIN — VANCOMYCIN HYDROCHLORIDE 1250 MG: 10 INJECTION, POWDER, LYOPHILIZED, FOR SOLUTION INTRAVENOUS at 12:19

## 2017-01-01 RX ADMIN — SODIUM CHLORIDE, SODIUM LACTATE, POTASSIUM CHLORIDE, AND CALCIUM CHLORIDE: 600; 310; 30; 20 INJECTION, SOLUTION INTRAVENOUS at 09:43

## 2017-01-01 RX ADMIN — HEPARIN SODIUM 5000 UNITS: 5000 INJECTION, SOLUTION INTRAVENOUS; SUBCUTANEOUS at 06:39

## 2017-01-01 RX ADMIN — SODIUM CHLORIDE 100 ML/HR: 900 INJECTION, SOLUTION INTRAVENOUS at 03:34

## 2017-01-01 RX ADMIN — MORPHINE SULFATE 1 MG: 4 INJECTION, SOLUTION INTRAMUSCULAR; INTRAVENOUS at 08:13

## 2017-01-01 RX ADMIN — ACETAMINOPHEN 650 MG: 325 TABLET, FILM COATED ORAL at 09:27

## 2017-01-01 RX ADMIN — DIATRIZOATE MEGLUMINE AND DIATRIZOATE SODIUM 10 ML: 660; 100 LIQUID ORAL; RECTAL at 07:44

## 2017-01-01 RX ADMIN — OXYCODONE HYDROCHLORIDE 20 MG: 20 TABLET, FILM COATED, EXTENDED RELEASE ORAL at 16:45

## 2017-01-01 RX ADMIN — PAROXETINE HYDROCHLORIDE 40 MG: 20 TABLET, FILM COATED ORAL at 08:36

## 2017-01-01 RX ADMIN — ENOXAPARIN SODIUM 40 MG: 40 INJECTION SUBCUTANEOUS at 17:32

## 2017-01-01 RX ADMIN — OXYCODONE HYDROCHLORIDE 20 MG: 20 TABLET, FILM COATED, EXTENDED RELEASE ORAL at 05:14

## 2017-01-01 RX ADMIN — SODIUM CHLORIDE, PRESERVATIVE FREE 500 UNITS: 5 INJECTION INTRAVENOUS at 15:40

## 2017-01-01 RX ADMIN — PAROXETINE HYDROCHLORIDE 40 MG: 20 TABLET, FILM COATED ORAL at 07:42

## 2017-01-01 RX ADMIN — SODIUM CHLORIDE, SODIUM LACTATE, POTASSIUM CHLORIDE, AND CALCIUM CHLORIDE 75 ML/HR: 600; 310; 30; 20 INJECTION, SOLUTION INTRAVENOUS at 07:33

## 2017-01-01 RX ADMIN — HYDROMORPHONE HYDROCHLORIDE 4 MG: 4 TABLET ORAL at 21:21

## 2017-01-01 RX ADMIN — ACETAMINOPHEN 650 MG: 325 TABLET, FILM COATED ORAL at 08:44

## 2017-01-01 RX ADMIN — CHLORDIAZEPOXIDE HYDROCHLORIDE 5 MG: 5 CAPSULE ORAL at 16:11

## 2017-01-01 RX ADMIN — MORPHINE SULFATE 1 MG: 4 INJECTION, SOLUTION INTRAMUSCULAR; INTRAVENOUS at 16:27

## 2017-01-01 RX ADMIN — POTASSIUM CHLORIDE 20 MEQ: 14.9 INJECTION, SOLUTION INTRAVENOUS at 10:36

## 2017-01-01 RX ADMIN — HEPARIN SODIUM 5000 UNITS: 5000 INJECTION, SOLUTION INTRAVENOUS; SUBCUTANEOUS at 06:41

## 2017-01-01 RX ADMIN — SODIUM CHLORIDE, SODIUM LACTATE, POTASSIUM CHLORIDE, AND CALCIUM CHLORIDE 125 ML/HR: 600; 310; 30; 20 INJECTION, SOLUTION INTRAVENOUS at 17:05

## 2017-01-01 RX ADMIN — Medication 10 ML: at 10:39

## 2017-01-01 RX ADMIN — ONDANSETRON 8 MG: 2 INJECTION INTRAMUSCULAR; INTRAVENOUS at 08:18

## 2017-01-01 RX ADMIN — METOPROLOL TARTRATE 50 MG: 50 TABLET ORAL at 06:37

## 2017-01-01 RX ADMIN — Medication 10 ML: at 06:01

## 2017-01-01 RX ADMIN — HYDROMORPHONE HYDROCHLORIDE 1 MG: 1 INJECTION, SOLUTION INTRAMUSCULAR; INTRAVENOUS; SUBCUTANEOUS at 13:30

## 2017-01-01 RX ADMIN — CYCLOBENZAPRINE HYDROCHLORIDE 10 MG: 10 TABLET, FILM COATED ORAL at 11:47

## 2017-01-01 RX ADMIN — POTASSIUM CHLORIDE 40 MEQ: 29.8 INJECTION, SOLUTION INTRAVENOUS at 09:16

## 2017-01-01 RX ADMIN — LEVETIRACETAM 1000 MG: 100 INJECTION, SOLUTION INTRAVENOUS at 21:34

## 2017-01-01 RX ADMIN — OXALIPLATIN 165 MG: 5 INJECTION, SOLUTION, CONCENTRATE INTRAVENOUS at 10:51

## 2017-01-01 RX ADMIN — PIPERACILLIN SODIUM,TAZOBACTAM SODIUM 4.5 G: 4; .5 INJECTION, POWDER, FOR SOLUTION INTRAVENOUS at 22:48

## 2017-01-01 RX ADMIN — Medication 10 ML: at 13:35

## 2017-01-01 RX ADMIN — LIDOCAINE HYDROCHLORIDE 100 MG: 20 INJECTION, SOLUTION EPIDURAL; INFILTRATION; INTRACAUDAL; PERINEURAL at 13:52

## 2017-01-01 RX ADMIN — ENOXAPARIN SODIUM 40 MG: 40 INJECTION SUBCUTANEOUS at 18:43

## 2017-01-01 RX ADMIN — DIPHENHYDRAMINE HYDROCHLORIDE 25 MG: 50 INJECTION, SOLUTION INTRAMUSCULAR; INTRAVENOUS at 08:47

## 2017-01-01 RX ADMIN — LORAZEPAM 1 MG: 1 TABLET ORAL at 06:20

## 2017-01-01 RX ADMIN — ONDANSETRON 4 MG: 2 INJECTION INTRAMUSCULAR; INTRAVENOUS at 16:26

## 2017-01-01 RX ADMIN — LEVETIRACETAM 1000 MG: 100 INJECTION, SOLUTION INTRAVENOUS at 10:11

## 2017-01-01 RX ADMIN — OXALIPLATIN 136 MG: 5 INJECTION, SOLUTION, CONCENTRATE INTRAVENOUS at 12:20

## 2017-01-01 RX ADMIN — DIPHENHYDRAMINE HYDROCHLORIDE 25 MG: 50 INJECTION, SOLUTION INTRAMUSCULAR; INTRAVENOUS at 09:27

## 2017-01-01 RX ADMIN — PAROXETINE HYDROCHLORIDE 40 MG: 20 TABLET, FILM COATED ORAL at 09:35

## 2017-01-01 RX ADMIN — DEXAMETHASONE SODIUM PHOSPHATE 4 MG: 4 INJECTION, SOLUTION INTRA-ARTICULAR; INTRALESIONAL; INTRAMUSCULAR; INTRAVENOUS; SOFT TISSUE at 14:15

## 2017-01-01 RX ADMIN — PIPERACILLIN SODIUM,TAZOBACTAM SODIUM 4.5 G: 4; .5 INJECTION, POWDER, FOR SOLUTION INTRAVENOUS at 14:26

## 2017-01-01 RX ADMIN — ALPRAZOLAM 2 MG: 0.5 TABLET ORAL at 22:04

## 2017-01-01 RX ADMIN — OXYCODONE HYDROCHLORIDE AND ACETAMINOPHEN 1 TABLET: 10; 325 TABLET ORAL at 20:50

## 2017-01-01 RX ADMIN — ENOXAPARIN SODIUM 40 MG: 40 INJECTION SUBCUTANEOUS at 17:04

## 2017-01-01 RX ADMIN — LORAZEPAM 1 MG: 1 TABLET ORAL at 05:12

## 2017-01-01 RX ADMIN — OXYCODONE HYDROCHLORIDE 20 MG: 20 TABLET, FILM COATED, EXTENDED RELEASE ORAL at 05:53

## 2017-01-01 RX ADMIN — POTASSIUM CHLORIDE 20 MEQ: 20 TABLET, EXTENDED RELEASE ORAL at 08:57

## 2017-01-01 RX ADMIN — PIPERACILLIN SODIUM,TAZOBACTAM SODIUM 3.38 G: 3; .375 INJECTION, POWDER, FOR SOLUTION INTRAVENOUS at 09:15

## 2017-01-01 RX ADMIN — INSULIN LISPRO 2 UNITS: 100 INJECTION, SOLUTION INTRAVENOUS; SUBCUTANEOUS at 08:30

## 2017-01-01 RX ADMIN — OXYCODONE HYDROCHLORIDE AND ACETAMINOPHEN 2 TABLET: 5; 325 TABLET ORAL at 22:04

## 2017-01-01 RX ADMIN — CYCLOBENZAPRINE HYDROCHLORIDE 10 MG: 10 TABLET, FILM COATED ORAL at 16:30

## 2017-01-01 RX ADMIN — POTASSIUM CHLORIDE 20 MEQ: 20 TABLET, EXTENDED RELEASE ORAL at 17:59

## 2017-01-01 RX ADMIN — OXYCODONE HYDROCHLORIDE 20 MG: 20 TABLET, FILM COATED, EXTENDED RELEASE ORAL at 06:38

## 2017-01-01 RX ADMIN — PAROXETINE HYDROCHLORIDE 40 MG: 20 TABLET, FILM COATED ORAL at 08:40

## 2017-01-01 RX ADMIN — DEXAMETHASONE SODIUM PHOSPHATE 4 MG: 4 INJECTION, SOLUTION INTRAMUSCULAR; INTRAVENOUS at 05:25

## 2017-01-01 RX ADMIN — OXYCODONE HYDROCHLORIDE AND ACETAMINOPHEN 1 TABLET: 10; 325 TABLET ORAL at 09:23

## 2017-01-01 RX ADMIN — PIPERACILLIN SODIUM,TAZOBACTAM SODIUM 4.5 G: 4; .5 INJECTION, POWDER, FOR SOLUTION INTRAVENOUS at 20:04

## 2017-01-01 RX ADMIN — LEUCOVORIN CALCIUM 485 MG: 350 INJECTION, POWDER, LYOPHILIZED, FOR SOLUTION INTRAMUSCULAR; INTRAVENOUS at 10:50

## 2017-01-01 RX ADMIN — ALPRAZOLAM 0.25 MG: 0.5 TABLET ORAL at 00:01

## 2017-01-01 RX ADMIN — BEVACIZUMAB 412 MG: 400 INJECTION, SOLUTION INTRAVENOUS at 09:30

## 2017-01-01 RX ADMIN — Medication 10 ML: at 11:25

## 2017-01-01 RX ADMIN — Medication 10 ML: at 08:10

## 2017-01-01 RX ADMIN — SODIUM CHLORIDE 1000 ML: 900 INJECTION, SOLUTION INTRAVENOUS at 15:34

## 2017-01-01 RX ADMIN — DRONABINOL 5 MG: 5 CAPSULE ORAL at 09:15

## 2017-01-01 RX ADMIN — DIPHENHYDRAMINE HYDROCHLORIDE 25 MG: 50 INJECTION, SOLUTION INTRAMUSCULAR; INTRAVENOUS at 20:31

## 2017-01-01 RX ADMIN — METOPROLOL TARTRATE 50 MG: 50 TABLET ORAL at 09:59

## 2017-01-01 RX ADMIN — FLUOROURACIL 485 MG: 50 INJECTION, SOLUTION INTRAVENOUS at 14:00

## 2017-01-01 RX ADMIN — Medication 2 MG: at 10:08

## 2017-01-01 RX ADMIN — METOPROLOL TARTRATE 50 MG: 50 TABLET ORAL at 17:17

## 2017-01-01 RX ADMIN — Medication 10 ML: at 21:47

## 2017-01-01 RX ADMIN — DEXAMETHASONE SODIUM PHOSPHATE 4 MG: 4 INJECTION, SOLUTION INTRAMUSCULAR; INTRAVENOUS at 00:21

## 2017-01-01 RX ADMIN — Medication 10 ML: at 10:45

## 2017-01-01 RX ADMIN — MAGNESIUM HYDROXIDE 30 ML: 400 SUSPENSION ORAL at 09:58

## 2017-01-01 RX ADMIN — MAGNESIUM HYDROXIDE 30 ML: 400 SUSPENSION ORAL at 07:35

## 2017-01-01 RX ADMIN — DEXTROSE MONOHYDRATE AND SODIUM CHLORIDE 75 ML/HR: 5; .9 INJECTION, SOLUTION INTRAVENOUS at 11:00

## 2017-01-01 RX ADMIN — HEPARIN SODIUM 5000 UNITS: 5000 INJECTION, SOLUTION INTRAVENOUS; SUBCUTANEOUS at 05:16

## 2017-01-01 RX ADMIN — HEPARIN SODIUM 5000 UNITS: 5000 INJECTION, SOLUTION INTRAVENOUS; SUBCUTANEOUS at 21:17

## 2017-01-01 RX ADMIN — ONDANSETRON 8 MG: 2 INJECTION INTRAMUSCULAR; INTRAVENOUS at 11:54

## 2017-01-01 RX ADMIN — LEUCOVORIN CALCIUM 582 MG: 200 INJECTION, POWDER, LYOPHILIZED, FOR SOLUTION INTRAMUSCULAR; INTRAVENOUS at 10:51

## 2017-01-01 RX ADMIN — ESMOLOL HYDROCHLORIDE 10 MG: 10 INJECTION INTRAVENOUS at 11:14

## 2017-01-01 RX ADMIN — LEUCOVORIN CALCIUM 468 MG: 200 INJECTION, POWDER, LYOPHILIZED, FOR SOLUTION INTRAMUSCULAR; INTRAVENOUS at 09:25

## 2017-01-01 RX ADMIN — POTASSIUM CHLORIDE 20 MEQ: 14.9 INJECTION, SOLUTION INTRAVENOUS at 14:55

## 2017-01-01 RX ADMIN — HYDRALAZINE HYDROCHLORIDE 10 MG: 20 INJECTION INTRAMUSCULAR; INTRAVENOUS at 03:38

## 2017-01-01 RX ADMIN — FLUOROURACIL 467.5 MG: 50 INJECTION, SOLUTION INTRAVENOUS at 11:47

## 2017-01-01 RX ADMIN — Medication 10 ML: at 05:52

## 2017-01-01 RX ADMIN — POTASSIUM CHLORIDE 40 MEQ: 20 TABLET, EXTENDED RELEASE ORAL at 17:03

## 2017-01-01 RX ADMIN — Medication 10 ML: at 10:15

## 2017-01-01 RX ADMIN — CYCLOBENZAPRINE HYDROCHLORIDE 10 MG: 10 TABLET, FILM COATED ORAL at 09:52

## 2017-01-01 RX ADMIN — HEPARIN SODIUM 5000 UNITS: 5000 INJECTION, SOLUTION INTRAVENOUS; SUBCUTANEOUS at 21:33

## 2017-01-01 RX ADMIN — DEXTROSE MONOHYDRATE AND SODIUM CHLORIDE 75 ML/HR: 5; .9 INJECTION, SOLUTION INTRAVENOUS at 20:22

## 2017-01-01 RX ADMIN — POTASSIUM CHLORIDE 20 MEQ: 20 TABLET, EXTENDED RELEASE ORAL at 21:53

## 2017-01-01 RX ADMIN — MAGNESIUM HYDROXIDE 30 ML: 400 SUSPENSION ORAL at 17:32

## 2017-01-01 RX ADMIN — METOPROLOL TARTRATE 25 MG: 25 TABLET, FILM COATED ORAL at 08:13

## 2017-01-01 RX ADMIN — CYCLOBENZAPRINE HYDROCHLORIDE 10 MG: 10 TABLET, FILM COATED ORAL at 11:59

## 2017-01-01 RX ADMIN — PIPERACILLIN SODIUM,TAZOBACTAM SODIUM 3.38 G: 3; .375 INJECTION, POWDER, FOR SOLUTION INTRAVENOUS at 16:54

## 2017-01-01 RX ADMIN — Medication 10 ML: at 03:39

## 2017-01-01 RX ADMIN — HYDROMORPHONE HYDROCHLORIDE 1 MG: 1 INJECTION, SOLUTION INTRAMUSCULAR; INTRAVENOUS; SUBCUTANEOUS at 02:13

## 2017-01-01 RX ADMIN — ACETAMINOPHEN 650 MG: 325 TABLET, FILM COATED ORAL at 10:41

## 2017-01-01 RX ADMIN — Medication 10 ML: at 00:50

## 2017-01-01 RX ADMIN — MORPHINE SULFATE 2 MG: 4 INJECTION, SOLUTION INTRAMUSCULAR; INTRAVENOUS at 22:36

## 2017-01-01 RX ADMIN — FAMOTIDINE 20 MG: 10 INJECTION, SOLUTION INTRAVENOUS at 21:51

## 2017-01-01 RX ADMIN — ONDANSETRON 8 MG: 2 INJECTION INTRAMUSCULAR; INTRAVENOUS at 10:33

## 2017-01-01 RX ADMIN — ROCURONIUM BROMIDE 40 MG: 10 INJECTION, SOLUTION INTRAVENOUS at 07:57

## 2017-01-01 RX ADMIN — Medication 10 ML: at 21:35

## 2017-01-01 RX ADMIN — HEPARIN SODIUM 5000 UNITS: 5000 INJECTION, SOLUTION INTRAVENOUS; SUBCUTANEOUS at 07:12

## 2017-01-01 RX ADMIN — SODIUM CHLORIDE: 9 INJECTION, SOLUTION INTRAVENOUS at 08:22

## 2017-01-01 RX ADMIN — BEVACIZUMAB 412 MG: 400 INJECTION, SOLUTION INTRAVENOUS at 11:10

## 2017-01-01 RX ADMIN — ONDANSETRON 4 MG: 2 INJECTION INTRAMUSCULAR; INTRAVENOUS at 13:30

## 2017-01-01 RX ADMIN — LEUCOVORIN CALCIUM 468 MG: 500 INJECTION, POWDER, LYOPHILIZED, FOR SOLUTION INTRAMUSCULAR; INTRAVENOUS at 09:35

## 2017-01-01 RX ADMIN — DEXAMETHASONE SODIUM PHOSPHATE 4 MG: 4 INJECTION, SOLUTION INTRAMUSCULAR; INTRAVENOUS at 12:19

## 2017-01-01 RX ADMIN — OXYCODONE HYDROCHLORIDE 10 MG: 10 TABLET, FILM COATED, EXTENDED RELEASE ORAL at 08:07

## 2017-01-01 RX ADMIN — ROCURONIUM BROMIDE 20 MG: 10 INJECTION, SOLUTION INTRAVENOUS at 08:30

## 2017-01-01 RX ADMIN — MAGNESIUM HYDROXIDE 30 ML: 400 SUSPENSION ORAL at 17:31

## 2017-01-01 RX ADMIN — Medication 10 ML: at 05:25

## 2017-01-01 RX ADMIN — DEXAMETHASONE SODIUM PHOSPHATE 10 MG: 10 INJECTION INTRAMUSCULAR; INTRAVENOUS at 08:16

## 2017-01-01 RX ADMIN — HYDROCODONE BITARTRATE AND ACETAMINOPHEN 2 TABLET: 5; 325 TABLET ORAL at 16:39

## 2017-01-01 RX ADMIN — DEXAMETHASONE SODIUM PHOSPHATE 4 MG: 4 INJECTION, SOLUTION INTRAMUSCULAR; INTRAVENOUS at 17:33

## 2017-01-01 RX ADMIN — Medication 10 ML: at 16:06

## 2017-01-01 RX ADMIN — TUBERCULIN PURIFIED PROTEIN DERIVATIVE 5 UNITS: 5 INJECTION INTRADERMAL at 00:46

## 2017-01-01 RX ADMIN — CYCLOBENZAPRINE HYDROCHLORIDE 10 MG: 10 TABLET, FILM COATED ORAL at 08:00

## 2017-01-01 RX ADMIN — ZOLPIDEM TARTRATE 5 MG: 5 TABLET ORAL at 00:39

## 2017-01-01 RX ADMIN — SODIUM CHLORIDE, PRESERVATIVE FREE 300 UNITS: 5 INJECTION INTRAVENOUS at 18:05

## 2017-01-01 RX ADMIN — HYDROMORPHONE HYDROCHLORIDE 1 MG: 1 INJECTION, SOLUTION INTRAMUSCULAR; INTRAVENOUS; SUBCUTANEOUS at 06:15

## 2017-01-01 RX ADMIN — NEOSTIGMINE METHYLSULFATE 3 MG: 1 INJECTION INTRAVENOUS at 11:30

## 2017-01-01 RX ADMIN — FAMOTIDINE 20 MG: 10 INJECTION, SOLUTION INTRAVENOUS at 08:14

## 2017-01-01 RX ADMIN — HYDROMORPHONE HYDROCHLORIDE 1 MG: 1 INJECTION, SOLUTION INTRAMUSCULAR; INTRAVENOUS; SUBCUTANEOUS at 14:44

## 2017-01-01 RX ADMIN — LORAZEPAM 1 MG: 1 TABLET ORAL at 11:54

## 2017-01-01 RX ADMIN — MORPHINE SULFATE 1 MG: 4 INJECTION, SOLUTION INTRAMUSCULAR; INTRAVENOUS at 23:17

## 2017-01-01 RX ADMIN — CYCLOBENZAPRINE HYDROCHLORIDE 10 MG: 10 TABLET, FILM COATED ORAL at 08:17

## 2017-01-01 RX ADMIN — Medication 10 ML: at 19:35

## 2017-01-01 RX ADMIN — Medication 10 ML: at 13:36

## 2017-01-01 RX ADMIN — Medication 10 ML: at 08:15

## 2017-01-01 RX ADMIN — ENOXAPARIN SODIUM 40 MG: 40 INJECTION SUBCUTANEOUS at 17:51

## 2017-01-01 RX ADMIN — DEXAMETHASONE SODIUM PHOSPHATE 4 MG: 4 INJECTION, SOLUTION INTRAMUSCULAR; INTRAVENOUS at 05:19

## 2017-01-01 RX ADMIN — FLUOROURACIL 582 MG: 50 INJECTION, SOLUTION INTRAVENOUS at 12:55

## 2017-01-01 RX ADMIN — PREDNISONE 10 MG: 10 TABLET ORAL at 08:14

## 2017-01-01 RX ADMIN — PREDNISONE 10 MG: 10 TABLET ORAL at 10:31

## 2017-01-01 RX ADMIN — ENOXAPARIN SODIUM 40 MG: 40 INJECTION SUBCUTANEOUS at 17:27

## 2017-01-01 RX ADMIN — PIPERACILLIN SODIUM,TAZOBACTAM SODIUM 3.38 G: 3; .375 INJECTION, POWDER, FOR SOLUTION INTRAVENOUS at 02:08

## 2017-01-01 RX ADMIN — HYDROMORPHONE HYDROCHLORIDE 4 MG: 4 TABLET ORAL at 17:34

## 2017-01-01 RX ADMIN — PIPERACILLIN SODIUM,TAZOBACTAM SODIUM 4.5 G: 4; .5 INJECTION, POWDER, FOR SOLUTION INTRAVENOUS at 22:45

## 2017-01-01 RX ADMIN — POTASSIUM CHLORIDE 20 MEQ: 14.9 INJECTION, SOLUTION INTRAVENOUS at 12:00

## 2017-01-01 RX ADMIN — SODIUM CHLORIDE 100 ML/HR: 900 INJECTION, SOLUTION INTRAVENOUS at 16:12

## 2017-01-01 RX ADMIN — Medication 10 ML: at 12:50

## 2017-01-01 RX ADMIN — SODIUM CHLORIDE 40 MG: 9 INJECTION INTRAMUSCULAR; INTRAVENOUS; SUBCUTANEOUS at 08:46

## 2017-01-01 RX ADMIN — SODIUM CHLORIDE 500 ML: 900 INJECTION, SOLUTION INTRAVENOUS at 09:38

## 2017-01-01 RX ADMIN — DEXTROSE MONOHYDRATE 25 ML/HR: 5 INJECTION, SOLUTION INTRAVENOUS at 09:22

## 2017-01-01 RX ADMIN — Medication 10 ML: at 14:40

## 2017-01-01 RX ADMIN — Medication 2 MG: at 01:48

## 2017-01-01 RX ADMIN — OXYCODONE HYDROCHLORIDE AND ACETAMINOPHEN 1 TABLET: 10; 325 TABLET ORAL at 19:31

## 2017-01-01 RX ADMIN — ONDANSETRON 4 MG: 2 INJECTION INTRAMUSCULAR; INTRAVENOUS at 11:49

## 2017-01-01 RX ADMIN — ROCURONIUM BROMIDE 10 MG: 10 INJECTION, SOLUTION INTRAVENOUS at 09:32

## 2017-01-01 RX ADMIN — SODIUM CHLORIDE 1000 ML: 900 INJECTION, SOLUTION INTRAVENOUS at 15:35

## 2017-01-01 RX ADMIN — LEUCOVORIN CALCIUM 485 MG: 200 INJECTION, POWDER, LYOPHILIZED, FOR SOLUTION INTRAMUSCULAR; INTRAVENOUS at 12:20

## 2017-01-01 RX ADMIN — PAROXETINE HYDROCHLORIDE 40 MG: 20 TABLET, FILM COATED ORAL at 11:16

## 2017-01-01 RX ADMIN — METOPROLOL TARTRATE 25 MG: 25 TABLET, FILM COATED ORAL at 21:52

## 2017-01-01 RX ADMIN — OXYCODONE HYDROCHLORIDE AND ACETAMINOPHEN 1 TABLET: 10; 325 TABLET ORAL at 16:08

## 2017-01-01 RX ADMIN — DIPHENHYDRAMINE HYDROCHLORIDE 50 MG: 50 INJECTION, SOLUTION INTRAMUSCULAR; INTRAVENOUS at 07:41

## 2017-01-01 RX ADMIN — PAROXETINE HYDROCHLORIDE 20 MG: 20 TABLET, FILM COATED ORAL at 08:57

## 2017-01-01 RX ADMIN — Medication 2 MG: at 17:17

## 2017-01-01 RX ADMIN — MAGNESIUM HYDROXIDE 30 ML: 400 SUSPENSION ORAL at 16:09

## 2017-01-01 RX ADMIN — SODIUM CHLORIDE 40 MG: 9 INJECTION INTRAMUSCULAR; INTRAVENOUS; SUBCUTANEOUS at 17:04

## 2017-01-01 RX ADMIN — METOPROLOL TARTRATE 25 MG: 25 TABLET, FILM COATED ORAL at 08:48

## 2017-01-01 RX ADMIN — LEVETIRACETAM 1000 MG: 100 INJECTION, SOLUTION INTRAVENOUS at 10:42

## 2017-01-01 RX ADMIN — HYDROMORPHONE HYDROCHLORIDE: 2 INJECTION INTRAMUSCULAR; INTRAVENOUS; SUBCUTANEOUS at 13:06

## 2017-01-01 RX ADMIN — LORAZEPAM 1 MG: 1 TABLET ORAL at 17:32

## 2017-01-01 RX ADMIN — HEPARIN SODIUM 5000 UNITS: 5000 INJECTION, SOLUTION INTRAVENOUS; SUBCUTANEOUS at 05:53

## 2017-01-01 RX ADMIN — HEPARIN SODIUM 5000 UNITS: 5000 INJECTION, SOLUTION INTRAVENOUS; SUBCUTANEOUS at 13:22

## 2017-01-01 RX ADMIN — DEXAMETHASONE SODIUM PHOSPHATE 10 MG: 10 INJECTION INTRAMUSCULAR; INTRAVENOUS at 10:34

## 2017-01-01 RX ADMIN — DEXAMETHASONE SODIUM PHOSPHATE 4 MG: 4 INJECTION, SOLUTION INTRAMUSCULAR; INTRAVENOUS at 11:47

## 2017-01-01 RX ADMIN — PIPERACILLIN SODIUM,TAZOBACTAM SODIUM 4.5 G: 4; .5 INJECTION, POWDER, FOR SOLUTION INTRAVENOUS at 06:35

## 2017-01-01 RX ADMIN — DEXTROSE MONOHYDRATE 25 ML/HR: 5 INJECTION, SOLUTION INTRAVENOUS at 11:50

## 2017-01-01 RX ADMIN — FLUOROURACIL 467.5 MG: 50 INJECTION, SOLUTION INTRAVENOUS at 11:45

## 2017-01-01 RX ADMIN — INSULIN LISPRO 2 UNITS: 100 INJECTION, SOLUTION INTRAVENOUS; SUBCUTANEOUS at 21:47

## 2017-01-01 RX ADMIN — METOPROLOL TARTRATE 25 MG: 25 TABLET, FILM COATED ORAL at 09:00

## 2017-01-01 RX ADMIN — LORAZEPAM 1 MG: 1 TABLET ORAL at 02:32

## 2017-01-01 RX ADMIN — HEPARIN SODIUM 5000 UNITS: 5000 INJECTION, SOLUTION INTRAVENOUS; SUBCUTANEOUS at 21:46

## 2017-01-01 RX ADMIN — HYDROMORPHONE HYDROCHLORIDE 2 MG: 1 INJECTION, SOLUTION INTRAMUSCULAR; INTRAVENOUS; SUBCUTANEOUS at 08:41

## 2017-01-01 RX ADMIN — SODIUM CHLORIDE 100 ML/HR: 900 INJECTION, SOLUTION INTRAVENOUS at 02:00

## 2017-01-01 RX ADMIN — Medication 10 ML: at 15:50

## 2017-01-01 RX ADMIN — HYDROMORPHONE HYDROCHLORIDE 0.5 MG: 1 INJECTION, SOLUTION INTRAMUSCULAR; INTRAVENOUS; SUBCUTANEOUS at 21:16

## 2017-01-01 RX ADMIN — Medication 10 ML: at 15:00

## 2017-01-01 RX ADMIN — Medication 10 ML: at 08:50

## 2017-01-01 RX ADMIN — ONDANSETRON 8 MG: 2 INJECTION, SOLUTION INTRAMUSCULAR; INTRAVENOUS at 09:30

## 2017-01-01 RX ADMIN — DOCUSATE SODIUM 100 MG: 100 CAPSULE, LIQUID FILLED ORAL at 18:43

## 2017-01-01 RX ADMIN — DEXAMETHASONE SODIUM PHOSPHATE 8 MG: 4 INJECTION, SOLUTION INTRA-ARTICULAR; INTRALESIONAL; INTRAMUSCULAR; INTRAVENOUS; SOFT TISSUE at 09:44

## 2017-01-01 RX ADMIN — HYDROMORPHONE HYDROCHLORIDE 2 MG: 1 INJECTION, SOLUTION INTRAMUSCULAR; INTRAVENOUS; SUBCUTANEOUS at 18:14

## 2017-01-01 RX ADMIN — TEMAZEPAM 15 MG: 15 CAPSULE ORAL at 00:57

## 2017-01-01 RX ADMIN — METOPROLOL TARTRATE 50 MG: 50 TABLET ORAL at 05:52

## 2017-01-01 RX ADMIN — Medication 10 ML: at 09:20

## 2017-01-01 RX ADMIN — POTASSIUM CHLORIDE 20 MEQ: 20 TABLET, EXTENDED RELEASE ORAL at 18:00

## 2017-01-01 RX ADMIN — HYDROCODONE BITARTRATE AND ACETAMINOPHEN 1 TABLET: 5; 325 TABLET ORAL at 08:35

## 2017-01-01 RX ADMIN — Medication 10 ML: at 18:10

## 2017-01-01 RX ADMIN — DOCUSATE SODIUM 100 MG: 100 CAPSULE, LIQUID FILLED ORAL at 18:24

## 2017-01-01 RX ADMIN — DIATRIZOATE MEGLUMINE AND DIATRIZOATE SODIUM 15 ML: 660; 100 LIQUID ORAL; RECTAL at 10:38

## 2017-01-01 RX ADMIN — Medication 10 ML: at 05:07

## 2017-01-01 RX ADMIN — PROPOFOL 150 MG: 10 INJECTION, EMULSION INTRAVENOUS at 07:56

## 2017-01-01 RX ADMIN — ACETAMINOPHEN 650 MG: 325 TABLET ORAL at 14:26

## 2017-01-01 RX ADMIN — CYCLOBENZAPRINE HYDROCHLORIDE 10 MG: 10 TABLET, FILM COATED ORAL at 12:19

## 2017-01-01 RX ADMIN — HYDROMORPHONE HYDROCHLORIDE 0.5 MG: 1 INJECTION, SOLUTION INTRAMUSCULAR; INTRAVENOUS; SUBCUTANEOUS at 23:22

## 2017-01-01 RX ADMIN — LORAZEPAM 1 MG: 2 INJECTION INTRAMUSCULAR; INTRAVENOUS at 14:43

## 2017-01-01 RX ADMIN — Medication 10 ML: at 16:26

## 2017-01-01 RX ADMIN — Medication 5 ML: at 20:38

## 2017-01-01 RX ADMIN — FENTANYL CITRATE 100 MCG: 50 INJECTION, SOLUTION INTRAMUSCULAR; INTRAVENOUS at 08:44

## 2017-01-01 RX ADMIN — CHLORDIAZEPOXIDE HYDROCHLORIDE 5 MG: 5 CAPSULE ORAL at 21:52

## 2017-01-01 RX ADMIN — DEXAMETHASONE SODIUM PHOSPHATE 4 MG: 4 INJECTION, SOLUTION INTRAMUSCULAR; INTRAVENOUS at 12:08

## 2017-01-01 RX ADMIN — INSULIN LISPRO 2 UNITS: 100 INJECTION, SOLUTION INTRAVENOUS; SUBCUTANEOUS at 17:33

## 2017-01-01 RX ADMIN — ATROPINE SULFATE 0.4 MG: 0.4 INJECTION, SOLUTION INTRAMUSCULAR; INTRAVENOUS; SUBCUTANEOUS at 09:10

## 2017-01-01 RX ADMIN — MAGNESIUM SULFATE HEPTAHYDRATE 2 G: 40 INJECTION, SOLUTION INTRAVENOUS at 10:12

## 2017-01-01 RX ADMIN — BEVACIZUMAB 381 MG: 400 INJECTION, SOLUTION INTRAVENOUS at 08:18

## 2017-01-01 RX ADMIN — OXYCODONE HYDROCHLORIDE AND ACETAMINOPHEN 1 TABLET: 10; 325 TABLET ORAL at 10:59

## 2017-01-01 RX ADMIN — DIPHENHYDRAMINE HYDROCHLORIDE 50 MG: 50 INJECTION, SOLUTION INTRAMUSCULAR; INTRAVENOUS at 08:26

## 2017-01-01 RX ADMIN — Medication 10 ML: at 09:00

## 2017-01-01 RX ADMIN — LORAZEPAM 1 MG: 1 TABLET ORAL at 08:48

## 2017-01-01 RX ADMIN — OXYCODONE HYDROCHLORIDE AND ACETAMINOPHEN 1 TABLET: 10; 325 TABLET ORAL at 09:36

## 2017-01-01 RX ADMIN — HYDROCODONE BITARTRATE AND ACETAMINOPHEN 1 TABLET: 5; 325 TABLET ORAL at 03:58

## 2017-01-01 RX ADMIN — Medication 10 ML: at 22:11

## 2017-01-01 RX ADMIN — FAMOTIDINE 20 MG: 10 INJECTION, SOLUTION INTRAVENOUS at 22:04

## 2017-01-01 RX ADMIN — LORAZEPAM 1 MG: 1 TABLET ORAL at 20:20

## 2017-01-01 RX ADMIN — HEPARIN SODIUM 5000 UNITS: 5000 INJECTION, SOLUTION INTRAVENOUS; SUBCUTANEOUS at 05:19

## 2017-01-01 RX ADMIN — Medication 5 ML: at 14:39

## 2017-01-01 RX ADMIN — Medication 300 UNITS: at 11:36

## 2017-01-01 RX ADMIN — PIPERACILLIN SODIUM,TAZOBACTAM SODIUM 4.5 G: 4; .5 INJECTION, POWDER, FOR SOLUTION INTRAVENOUS at 10:55

## 2017-01-01 RX ADMIN — HYDROMORPHONE HYDROCHLORIDE 4 MG: 4 TABLET ORAL at 06:13

## 2017-01-01 RX ADMIN — POTASSIUM CHLORIDE 20 MEQ: 20 TABLET, EXTENDED RELEASE ORAL at 08:36

## 2017-01-01 RX ADMIN — Medication 2 MG: at 06:11

## 2017-01-01 RX ADMIN — HYDROMORPHONE HYDROCHLORIDE 2 MG: 1 INJECTION, SOLUTION INTRAMUSCULAR; INTRAVENOUS; SUBCUTANEOUS at 13:14

## 2017-01-01 RX ADMIN — OXYCODONE HYDROCHLORIDE AND ACETAMINOPHEN 2 TABLET: 5; 325 TABLET ORAL at 04:25

## 2017-01-01 RX ADMIN — POTASSIUM CHLORIDE 40 MEQ: 29.8 INJECTION, SOLUTION INTRAVENOUS at 09:57

## 2017-01-01 RX ADMIN — INSULIN LISPRO 2 UNITS: 100 INJECTION, SOLUTION INTRAVENOUS; SUBCUTANEOUS at 12:08

## 2017-01-01 RX ADMIN — ROCURONIUM BROMIDE 8 MG: 10 INJECTION, SOLUTION INTRAVENOUS at 13:52

## 2017-01-01 RX ADMIN — HYDROMORPHONE HYDROCHLORIDE 8 MG: 4 TABLET ORAL at 09:15

## 2017-01-01 RX ADMIN — PAROXETINE HYDROCHLORIDE 20 MG: 20 TABLET, FILM COATED ORAL at 08:14

## 2017-01-01 RX ADMIN — GLYCOPYRROLATE 0.4 MG: 0.2 INJECTION INTRAMUSCULAR; INTRAVENOUS at 11:30

## 2017-01-01 RX ADMIN — FLUOROURACIL 582 MG: 50 INJECTION, SOLUTION INTRAVENOUS at 11:30

## 2017-01-01 RX ADMIN — PIPERACILLIN SODIUM,TAZOBACTAM SODIUM 3.38 G: 3; .375 INJECTION, POWDER, FOR SOLUTION INTRAVENOUS at 03:33

## 2017-01-01 RX ADMIN — PANTOPRAZOLE SODIUM 40 MG: 40 TABLET, DELAYED RELEASE ORAL at 07:17

## 2017-01-01 RX ADMIN — PIPERACILLIN SODIUM,TAZOBACTAM SODIUM 4.5 G: 4; .5 INJECTION, POWDER, FOR SOLUTION INTRAVENOUS at 14:56

## 2017-01-01 RX ADMIN — Medication 10 ML: at 17:05

## 2017-01-01 RX ADMIN — ENOXAPARIN SODIUM 40 MG: 40 INJECTION SUBCUTANEOUS at 16:26

## 2017-01-01 RX ADMIN — METOPROLOL TARTRATE 50 MG: 50 TABLET ORAL at 21:54

## 2017-01-01 RX ADMIN — Medication 10 ML: at 17:18

## 2017-01-01 RX ADMIN — Medication 10 ML: at 14:14

## 2017-01-01 RX ADMIN — HYDROMORPHONE HYDROCHLORIDE 0.5 MG: 2 INJECTION, SOLUTION INTRAMUSCULAR; INTRAVENOUS; SUBCUTANEOUS at 10:44

## 2017-01-01 RX ADMIN — ONDANSETRON 8 MG: 2 INJECTION INTRAMUSCULAR; INTRAVENOUS at 09:36

## 2017-01-01 RX ADMIN — ONDANSETRON 8 MG: 2 INJECTION INTRAMUSCULAR; INTRAVENOUS at 09:00

## 2017-01-01 RX ADMIN — FENTANYL CITRATE 50 MCG: 50 INJECTION, SOLUTION INTRAMUSCULAR; INTRAVENOUS at 09:35

## 2017-01-01 RX ADMIN — DRONABINOL 5 MG: 5 CAPSULE ORAL at 16:30

## 2017-01-01 RX ADMIN — DIPHENHYDRAMINE HYDROCHLORIDE 25 MG: 50 INJECTION, SOLUTION INTRAMUSCULAR; INTRAVENOUS at 01:57

## 2017-01-01 RX ADMIN — POTASSIUM CHLORIDE 20 MEQ: 20 TABLET, EXTENDED RELEASE ORAL at 08:14

## 2017-01-01 RX ADMIN — INSULIN LISPRO 2 UNITS: 100 INJECTION, SOLUTION INTRAVENOUS; SUBCUTANEOUS at 08:36

## 2017-01-01 RX ADMIN — SODIUM CHLORIDE, PRESERVATIVE FREE 500 UNITS: 5 INJECTION INTRAVENOUS at 15:25

## 2017-01-01 RX ADMIN — IRINOTECAN HYDROCHLORIDE 206 MG: 100 INJECTION, SOLUTION INTRAVENOUS at 10:02

## 2017-01-01 RX ADMIN — ONDANSETRON 4 MG: 2 INJECTION INTRAMUSCULAR; INTRAVENOUS at 18:19

## 2017-01-01 RX ADMIN — PAROXETINE HYDROCHLORIDE 40 MG: 20 TABLET, FILM COATED ORAL at 08:07

## 2017-01-01 RX ADMIN — SODIUM CHLORIDE, SODIUM LACTATE, POTASSIUM CHLORIDE, AND CALCIUM CHLORIDE 500 ML: 600; 310; 30; 20 INJECTION, SOLUTION INTRAVENOUS at 23:41

## 2017-01-01 RX ADMIN — FLUOROURACIL 467.5 MG: 50 INJECTION, SOLUTION INTRAVENOUS at 11:40

## 2017-01-01 RX ADMIN — DIPHENHYDRAMINE HYDROCHLORIDE 25 MG: 50 INJECTION, SOLUTION INTRAMUSCULAR; INTRAVENOUS at 10:46

## 2017-01-01 RX ADMIN — HYDROCODONE BITARTRATE AND ACETAMINOPHEN 1 TABLET: 5; 325 TABLET ORAL at 16:12

## 2017-01-01 RX ADMIN — PAROXETINE HYDROCHLORIDE 40 MG: 20 TABLET, FILM COATED ORAL at 08:17

## 2017-01-01 RX ADMIN — METOPROLOL TARTRATE 50 MG: 50 TABLET ORAL at 09:36

## 2017-01-01 RX ADMIN — HYDROCODONE BITARTRATE AND ACETAMINOPHEN 2 TABLET: 5; 325 TABLET ORAL at 08:58

## 2017-01-01 RX ADMIN — HYDROMORPHONE HYDROCHLORIDE 0.5 MG: 1 INJECTION, SOLUTION INTRAMUSCULAR; INTRAVENOUS; SUBCUTANEOUS at 15:34

## 2017-01-01 RX ADMIN — Medication 10 ML: at 21:56

## 2017-01-01 RX ADMIN — ONDANSETRON 4 MG: 2 INJECTION INTRAMUSCULAR; INTRAVENOUS at 19:32

## 2017-01-01 RX ADMIN — SODIUM CHLORIDE, SODIUM LACTATE, POTASSIUM CHLORIDE, AND CALCIUM CHLORIDE 75 ML/HR: 600; 310; 30; 20 INJECTION, SOLUTION INTRAVENOUS at 00:56

## 2017-01-01 RX ADMIN — DOCUSATE SODIUM 100 MG: 100 CAPSULE, LIQUID FILLED ORAL at 17:51

## 2017-01-01 RX ADMIN — HYDROMORPHONE HYDROCHLORIDE 1 MG: 1 INJECTION, SOLUTION INTRAMUSCULAR; INTRAVENOUS; SUBCUTANEOUS at 11:53

## 2017-01-01 RX ADMIN — ACETAMINOPHEN 650 MG: 325 TABLET, FILM COATED ORAL at 10:32

## 2017-01-01 RX ADMIN — SODIUM CHLORIDE 999 ML/HR: 900 INJECTION, SOLUTION INTRAVENOUS at 14:45

## 2017-01-01 RX ADMIN — ACETAMINOPHEN 650 MG: 325 TABLET, FILM COATED ORAL at 08:25

## 2017-01-01 RX ADMIN — CYCLOBENZAPRINE HYDROCHLORIDE 10 MG: 10 TABLET, FILM COATED ORAL at 16:54

## 2017-01-01 RX ADMIN — IRON DEXTRAN 975 MG: 50 INJECTION INTRAMUSCULAR; INTRAVENOUS at 22:05

## 2017-01-01 RX ADMIN — DEXTROSE MONOHYDRATE AND SODIUM CHLORIDE 75 ML/HR: 5; .9 INJECTION, SOLUTION INTRAVENOUS at 16:55

## 2017-01-01 RX ADMIN — OXYCODONE HYDROCHLORIDE AND ACETAMINOPHEN 1 TABLET: 10; 325 TABLET ORAL at 21:46

## 2017-01-01 RX ADMIN — PROPOFOL 150 MG: 10 INJECTION, EMULSION INTRAVENOUS at 13:52

## 2017-01-01 RX ADMIN — HYDRALAZINE HYDROCHLORIDE 10 MG: 20 INJECTION INTRAMUSCULAR; INTRAVENOUS at 05:18

## 2017-01-01 RX ADMIN — HYDROMORPHONE HYDROCHLORIDE 2 MG: 1 INJECTION, SOLUTION INTRAMUSCULAR; INTRAVENOUS; SUBCUTANEOUS at 02:42

## 2017-01-01 RX ADMIN — ACETAMINOPHEN 650 MG: 325 TABLET ORAL at 20:31

## 2017-01-01 RX ADMIN — METOPROLOL TARTRATE 25 MG: 25 TABLET, FILM COATED ORAL at 22:06

## 2017-01-01 RX ADMIN — LEUCOVORIN CALCIUM 485 MG: 350 INJECTION, POWDER, LYOPHILIZED, FOR SOLUTION INTRAMUSCULAR; INTRAVENOUS at 12:00

## 2017-01-01 RX ADMIN — PIPERACILLIN SODIUM,TAZOBACTAM SODIUM 4.5 G: 4; .5 INJECTION, POWDER, FOR SOLUTION INTRAVENOUS at 04:40

## 2017-01-01 RX ADMIN — MAGNESIUM SULFATE HEPTAHYDRATE 2 G: 40 INJECTION, SOLUTION INTRAVENOUS at 11:16

## 2017-01-01 RX ADMIN — HEPARIN SODIUM 5000 UNITS: 5000 INJECTION, SOLUTION INTRAVENOUS; SUBCUTANEOUS at 14:38

## 2017-01-01 RX ADMIN — SODIUM CHLORIDE, PRESERVATIVE FREE 300 UNITS: 5 INJECTION INTRAVENOUS at 18:10

## 2017-01-01 RX ADMIN — ATROPINE SULFATE 0.4 MG: 0.4 INJECTION, SOLUTION INTRAMUSCULAR; INTRAVENOUS; SUBCUTANEOUS at 09:29

## 2017-01-01 RX ADMIN — DRONABINOL 5 MG: 5 CAPSULE ORAL at 07:42

## 2017-01-01 RX ADMIN — OXALIPLATIN 165 MG: 5 INJECTION, SOLUTION, CONCENTRATE INTRAVENOUS at 09:20

## 2017-01-01 RX ADMIN — PIPERACILLIN SODIUM,TAZOBACTAM SODIUM 4.5 G: 4; .5 INJECTION, POWDER, FOR SOLUTION INTRAVENOUS at 04:10

## 2017-01-01 RX ADMIN — MAGNESIUM HYDROXIDE 30 ML: 400 SUSPENSION ORAL at 16:45

## 2017-01-01 RX ADMIN — Medication 10 ML: at 08:47

## 2017-01-01 RX ADMIN — MORPHINE SULFATE 1 MG: 4 INJECTION, SOLUTION INTRAMUSCULAR; INTRAVENOUS at 01:22

## 2017-01-01 RX ADMIN — LORAZEPAM 1 MG: 2 INJECTION INTRAMUSCULAR; INTRAVENOUS at 14:40

## 2017-01-01 RX ADMIN — OXYCODONE HYDROCHLORIDE AND ACETAMINOPHEN 2 TABLET: 5; 325 TABLET ORAL at 04:17

## 2017-01-01 RX ADMIN — SODIUM CHLORIDE, SODIUM LACTATE, POTASSIUM CHLORIDE, AND CALCIUM CHLORIDE 125 ML/HR: 600; 310; 30; 20 INJECTION, SOLUTION INTRAVENOUS at 04:56

## 2017-01-01 RX ADMIN — Medication 10 ML: at 15:35

## 2017-01-01 RX ADMIN — DEXAMETHASONE SODIUM PHOSPHATE 4 MG: 4 INJECTION, SOLUTION INTRAMUSCULAR; INTRAVENOUS at 06:39

## 2017-01-01 RX ADMIN — ACETAMINOPHEN 650 MG: 325 TABLET, FILM COATED ORAL at 09:03

## 2017-01-01 RX ADMIN — DOCUSATE SODIUM 100 MG: 100 CAPSULE, LIQUID FILLED ORAL at 17:27

## 2017-01-01 RX ADMIN — SODIUM CHLORIDE 100 ML/HR: 900 INJECTION, SOLUTION INTRAVENOUS at 13:31

## 2017-01-01 RX ADMIN — LORAZEPAM 1 MG: 2 INJECTION INTRAMUSCULAR; INTRAVENOUS at 02:07

## 2017-01-01 RX ADMIN — POTASSIUM CHLORIDE 20 MEQ: 20 TABLET, EXTENDED RELEASE ORAL at 16:34

## 2017-01-01 RX ADMIN — LEVOFLOXACIN 750 MG: 5 INJECTION, SOLUTION INTRAVENOUS at 18:00

## 2017-01-01 RX ADMIN — OXYCODONE HYDROCHLORIDE AND ACETAMINOPHEN 2 TABLET: 5; 325 TABLET ORAL at 07:17

## 2017-01-01 RX ADMIN — HYDROMORPHONE HYDROCHLORIDE 1 MG: 1 INJECTION, SOLUTION INTRAMUSCULAR; INTRAVENOUS; SUBCUTANEOUS at 22:48

## 2017-01-01 RX ADMIN — POTASSIUM CHLORIDE 20 MEQ: 14.9 INJECTION, SOLUTION INTRAVENOUS at 16:14

## 2017-01-01 RX ADMIN — MIDAZOLAM HYDROCHLORIDE 2 MG: 1 INJECTION, SOLUTION INTRAMUSCULAR; INTRAVENOUS at 08:00

## 2017-01-01 RX ADMIN — CYCLOBENZAPRINE HYDROCHLORIDE 10 MG: 10 TABLET, FILM COATED ORAL at 16:44

## 2017-01-01 RX ADMIN — Medication 2 MG: at 22:05

## 2017-01-01 RX ADMIN — DEXAMETHASONE SODIUM PHOSPHATE 10 MG: 10 INJECTION INTRAMUSCULAR; INTRAVENOUS at 09:32

## 2017-01-01 RX ADMIN — CYCLOBENZAPRINE HYDROCHLORIDE 10 MG: 10 TABLET, FILM COATED ORAL at 12:38

## 2017-01-01 RX ADMIN — LEUCOVORIN CALCIUM 468 MG: 200 INJECTION, POWDER, LYOPHILIZED, FOR SOLUTION INTRAMUSCULAR; INTRAVENOUS at 09:32

## 2017-01-01 RX ADMIN — DOCUSATE SODIUM 100 MG: 100 CAPSULE, LIQUID FILLED ORAL at 09:24

## 2017-01-01 RX ADMIN — Medication 5 ML: at 23:46

## 2017-01-01 RX ADMIN — POTASSIUM CHLORIDE 20 MEQ: 14.9 INJECTION, SOLUTION INTRAVENOUS at 10:12

## 2017-01-01 RX ADMIN — PAROXETINE HYDROCHLORIDE 40 MG: 20 TABLET, FILM COATED ORAL at 09:58

## 2017-01-01 RX ADMIN — PROPOFOL 50 MG: 10 INJECTION, EMULSION INTRAVENOUS at 07:58

## 2017-01-01 RX ADMIN — CHLORDIAZEPOXIDE HYDROCHLORIDE 5 MG: 5 CAPSULE ORAL at 09:24

## 2017-01-01 RX ADMIN — IOPAMIDOL 100 ML: 755 INJECTION, SOLUTION INTRAVENOUS at 16:26

## 2017-01-01 RX ADMIN — MORPHINE SULFATE 4 MG: 4 INJECTION, SOLUTION INTRAMUSCULAR; INTRAVENOUS at 20:37

## 2017-01-01 RX ADMIN — PIPERACILLIN SODIUM,TAZOBACTAM SODIUM 4.5 G: 4; .5 INJECTION, POWDER, FOR SOLUTION INTRAVENOUS at 11:28

## 2017-01-01 RX ADMIN — DEXAMETHASONE SODIUM PHOSPHATE 10 MG: 10 INJECTION INTRAMUSCULAR; INTRAVENOUS at 11:56

## 2017-01-01 RX ADMIN — MAGNESIUM HYDROXIDE 30 ML: 400 SUSPENSION ORAL at 09:35

## 2017-01-01 RX ADMIN — POTASSIUM CHLORIDE 20 MEQ: 20 TABLET, EXTENDED RELEASE ORAL at 16:39

## 2017-01-01 RX ADMIN — HEPARIN SODIUM 5000 UNITS: 5000 INJECTION, SOLUTION INTRAVENOUS; SUBCUTANEOUS at 17:17

## 2017-01-01 RX ADMIN — DIPHENHYDRAMINE HYDROCHLORIDE 25 MG: 50 INJECTION, SOLUTION INTRAMUSCULAR; INTRAVENOUS at 14:54

## 2017-01-01 RX ADMIN — OXYCODONE HYDROCHLORIDE AND ACETAMINOPHEN 1 TABLET: 10; 325 TABLET ORAL at 14:11

## 2017-01-01 RX ADMIN — CYCLOBENZAPRINE HYDROCHLORIDE 10 MG: 10 TABLET, FILM COATED ORAL at 17:59

## 2017-01-01 RX ADMIN — SODIUM CHLORIDE, PRESERVATIVE FREE 500 UNITS: 5 INJECTION INTRAVENOUS at 15:20

## 2017-01-01 RX ADMIN — PIPERACILLIN SODIUM,TAZOBACTAM SODIUM 4.5 G: 4; .5 INJECTION, POWDER, FOR SOLUTION INTRAVENOUS at 11:49

## 2017-01-01 RX ADMIN — HEPARIN SODIUM 5000 UNITS: 5000 INJECTION, SOLUTION INTRAVENOUS; SUBCUTANEOUS at 23:41

## 2017-01-01 RX ADMIN — LEVOFLOXACIN 750 MG: 5 INJECTION, SOLUTION INTRAVENOUS at 17:04

## 2017-01-01 RX ADMIN — FLUOROURACIL 485 MG: 50 INJECTION, SOLUTION INTRAVENOUS at 13:30

## 2017-01-01 RX ADMIN — OXYCODONE HYDROCHLORIDE AND ACETAMINOPHEN 2 TABLET: 5; 325 TABLET ORAL at 14:19

## 2017-01-01 RX ADMIN — HYDROMORPHONE HYDROCHLORIDE 1 MG: 1 INJECTION, SOLUTION INTRAMUSCULAR; INTRAVENOUS; SUBCUTANEOUS at 11:16

## 2017-01-01 RX ADMIN — MORPHINE SULFATE 1 MG: 4 INJECTION, SOLUTION INTRAMUSCULAR; INTRAVENOUS at 02:50

## 2017-01-01 RX ADMIN — ATROPINE SULFATE 0.4 MG: 0.4 INJECTION, SOLUTION INTRAMUSCULAR; INTRAVENOUS; SUBCUTANEOUS at 09:31

## 2017-01-01 RX ADMIN — METOPROLOL TARTRATE 12.5 MG: 25 TABLET ORAL at 11:40

## 2017-01-01 RX ADMIN — MAGNESIUM HYDROXIDE 30 ML: 400 SUSPENSION ORAL at 09:23

## 2017-01-01 RX ADMIN — Medication 10 ML: at 05:20

## 2017-01-01 RX ADMIN — ROCURONIUM BROMIDE 5 MG: 10 INJECTION, SOLUTION INTRAVENOUS at 11:07

## 2017-01-01 RX ADMIN — INSULIN LISPRO 2 UNITS: 100 INJECTION, SOLUTION INTRAVENOUS; SUBCUTANEOUS at 16:00

## 2017-01-01 RX ADMIN — HYDROMORPHONE HYDROCHLORIDE 1 MG: 1 INJECTION, SOLUTION INTRAMUSCULAR; INTRAVENOUS; SUBCUTANEOUS at 11:54

## 2017-01-01 RX ADMIN — HYDROMORPHONE HYDROCHLORIDE 1 MG: 1 INJECTION, SOLUTION INTRAMUSCULAR; INTRAVENOUS; SUBCUTANEOUS at 18:09

## 2017-01-01 RX ADMIN — ACETAMINOPHEN 650 MG: 325 TABLET, FILM COATED ORAL at 08:18

## 2017-01-01 RX ADMIN — LEVOFLOXACIN 750 MG: 5 INJECTION, SOLUTION INTRAVENOUS at 18:02

## 2017-01-01 RX ADMIN — CYCLOBENZAPRINE HYDROCHLORIDE 10 MG: 10 TABLET, FILM COATED ORAL at 12:51

## 2017-01-01 RX ADMIN — POTASSIUM CHLORIDE 20 MEQ: 14.9 INJECTION, SOLUTION INTRAVENOUS at 14:40

## 2017-01-01 RX ADMIN — HYDROMORPHONE HYDROCHLORIDE: 2 INJECTION INTRAMUSCULAR; INTRAVENOUS; SUBCUTANEOUS at 18:43

## 2017-01-01 RX ADMIN — CHLORDIAZEPOXIDE HYDROCHLORIDE 5 MG: 5 CAPSULE ORAL at 17:51

## 2017-01-01 RX ADMIN — Medication 10 ML: at 15:19

## 2017-01-01 RX ADMIN — SODIUM CHLORIDE, PRESERVATIVE FREE 500 UNITS: 5 INJECTION INTRAVENOUS at 13:35

## 2017-01-01 RX ADMIN — CYCLOBENZAPRINE HYDROCHLORIDE 10 MG: 10 TABLET, FILM COATED ORAL at 12:09

## 2017-01-01 RX ADMIN — PAROXETINE HYDROCHLORIDE 40 MG: 20 TABLET, FILM COATED ORAL at 09:23

## 2017-01-01 RX ADMIN — Medication 2 MG: at 20:25

## 2017-01-01 RX ADMIN — HEPARIN SODIUM 5000 UNITS: 5000 INJECTION, SOLUTION INTRAVENOUS; SUBCUTANEOUS at 22:47

## 2017-01-01 RX ADMIN — VANCOMYCIN HYDROCHLORIDE 1250 MG: 10 INJECTION, POWDER, LYOPHILIZED, FOR SOLUTION INTRAVENOUS at 01:43

## 2017-01-01 RX ADMIN — POTASSIUM CHLORIDE 40 MEQ: 400 INJECTION, SOLUTION INTRAVENOUS at 08:35

## 2017-01-01 RX ADMIN — VANCOMYCIN HYDROCHLORIDE 1250 MG: 10 INJECTION, POWDER, LYOPHILIZED, FOR SOLUTION INTRAVENOUS at 00:51

## 2017-01-01 RX ADMIN — PAROXETINE HYDROCHLORIDE 40 MG: 20 TABLET, FILM COATED ORAL at 06:38

## 2017-01-01 RX ADMIN — DIATRIZOATE MEGLUMINE AND DIATRIZOATE SODIUM 10 ML: 660; 100 LIQUID ORAL; RECTAL at 07:43

## 2017-01-01 RX ADMIN — HYDROMORPHONE HYDROCHLORIDE 1 MG: 1 INJECTION, SOLUTION INTRAMUSCULAR; INTRAVENOUS; SUBCUTANEOUS at 23:14

## 2017-01-01 RX ADMIN — IRINOTECAN HYDROCHLORIDE 206 MG: 100 INJECTION, SOLUTION INTRAVENOUS at 09:50

## 2017-01-01 RX ADMIN — LEUCOVORIN CALCIUM 582 MG: 200 INJECTION, POWDER, LYOPHILIZED, FOR SOLUTION INTRAMUSCULAR; INTRAVENOUS at 09:20

## 2017-01-01 RX ADMIN — LORAZEPAM 1 MG: 1 TABLET ORAL at 23:15

## 2017-01-01 RX ADMIN — HYDROMORPHONE HYDROCHLORIDE 0.5 MG: 1 INJECTION, SOLUTION INTRAMUSCULAR; INTRAVENOUS; SUBCUTANEOUS at 03:33

## 2017-01-01 RX ADMIN — DOCUSATE SODIUM 100 MG: 100 CAPSULE, LIQUID FILLED ORAL at 08:48

## 2017-01-01 RX ADMIN — MAGNESIUM HYDROXIDE 30 ML: 400 SUSPENSION ORAL at 08:48

## 2017-01-01 RX ADMIN — HYDROMORPHONE HYDROCHLORIDE 0.1 MG: 2 INJECTION, SOLUTION INTRAMUSCULAR; INTRAVENOUS; SUBCUTANEOUS at 11:59

## 2017-01-01 RX ADMIN — DIPHENHYDRAMINE HYDROCHLORIDE 50 MG: 50 INJECTION, SOLUTION INTRAMUSCULAR; INTRAVENOUS at 08:20

## 2017-01-01 RX ADMIN — Medication 10 ML: at 13:49

## 2017-01-01 RX ADMIN — IRINOTECAN HYDROCHLORIDE 206 MG: 100 INJECTION, SOLUTION INTRAVENOUS at 10:10

## 2017-01-01 RX ADMIN — Medication 10 ML: at 14:39

## 2017-01-01 RX ADMIN — LEVETIRACETAM 1000 MG: 100 INJECTION, SOLUTION INTRAVENOUS at 21:47

## 2017-01-01 RX ADMIN — HYDROMORPHONE HYDROCHLORIDE 1 MG: 1 INJECTION, SOLUTION INTRAMUSCULAR; INTRAVENOUS; SUBCUTANEOUS at 16:08

## 2017-01-01 RX ADMIN — FLUOROURACIL 485 MG: 50 INJECTION, SOLUTION INTRAVENOUS at 14:35

## 2017-01-01 RX ADMIN — PIPERACILLIN SODIUM,TAZOBACTAM SODIUM 4.5 G: 4; .5 INJECTION, POWDER, FOR SOLUTION INTRAVENOUS at 14:42

## 2017-01-01 RX ADMIN — DEXTROSE MONOHYDRATE 25 ML/HR: 5 INJECTION, SOLUTION INTRAVENOUS at 08:55

## 2017-01-01 RX ADMIN — POTASSIUM CHLORIDE 20 MEQ: 20 TABLET, EXTENDED RELEASE ORAL at 08:41

## 2017-01-01 RX ADMIN — DEXAMETHASONE SODIUM PHOSPHATE 4 MG: 4 INJECTION, SOLUTION INTRAMUSCULAR; INTRAVENOUS at 05:52

## 2017-01-01 RX ADMIN — OXALIPLATIN 136 MG: 5 INJECTION, SOLUTION, CONCENTRATE INTRAVENOUS at 10:51

## 2017-01-01 RX ADMIN — TEMAZEPAM 15 MG: 15 CAPSULE ORAL at 19:26

## 2017-01-01 RX ADMIN — HYDROMORPHONE HYDROCHLORIDE 0.5 MG: 2 INJECTION, SOLUTION INTRAMUSCULAR; INTRAVENOUS; SUBCUTANEOUS at 12:51

## 2017-01-01 RX ADMIN — DEXTROSE MONOHYDRATE 25 ML/HR: 5 INJECTION, SOLUTION INTRAVENOUS at 10:51

## 2017-01-01 RX ADMIN — DRONABINOL 5 MG: 5 CAPSULE ORAL at 08:35

## 2017-01-01 RX ADMIN — PANTOPRAZOLE SODIUM 40 MG: 40 TABLET, DELAYED RELEASE ORAL at 09:35

## 2017-01-01 RX ADMIN — POTASSIUM CHLORIDE 20 MEQ: 20 TABLET, EXTENDED RELEASE ORAL at 09:16

## 2017-01-01 RX ADMIN — METOPROLOL TARTRATE 12.5 MG: 25 TABLET ORAL at 22:26

## 2017-01-01 RX ADMIN — Medication 10 ML: at 06:38

## 2017-01-01 RX ADMIN — DEXTROSE MONOHYDRATE AND SODIUM CHLORIDE 50 ML/HR: 5; .45 INJECTION, SOLUTION INTRAVENOUS at 09:00

## 2017-01-01 RX ADMIN — Medication 10 ML: at 13:31

## 2017-01-01 RX ADMIN — Medication 10 ML: at 14:55

## 2017-01-01 RX ADMIN — SODIUM CHLORIDE 1000 ML: 900 INJECTION, SOLUTION INTRAVENOUS at 14:44

## 2017-01-01 RX ADMIN — DOCUSATE SODIUM 100 MG: 100 CAPSULE, LIQUID FILLED ORAL at 09:00

## 2017-01-01 RX ADMIN — TEMAZEPAM 15 MG: 15 CAPSULE ORAL at 00:09

## 2017-01-01 RX ADMIN — PIPERACILLIN SODIUM,TAZOBACTAM SODIUM 4.5 G: 4; .5 INJECTION, POWDER, FOR SOLUTION INTRAVENOUS at 22:09

## 2017-01-01 RX ADMIN — Medication 10 ML: at 14:09

## 2017-01-01 RX ADMIN — ALPRAZOLAM 0.25 MG: 0.5 TABLET ORAL at 23:55

## 2017-01-01 RX ADMIN — PIPERACILLIN SODIUM,TAZOBACTAM SODIUM 3.38 G: 3; .375 INJECTION, POWDER, FOR SOLUTION INTRAVENOUS at 01:37

## 2017-01-01 RX ADMIN — OXYCODONE HYDROCHLORIDE AND ACETAMINOPHEN 2 TABLET: 5; 325 TABLET ORAL at 19:55

## 2017-01-01 RX ADMIN — LEVOFLOXACIN 750 MG: 500 TABLET, FILM COATED ORAL at 17:27

## 2017-01-01 RX ADMIN — POTASSIUM CHLORIDE 20 MEQ: 14.9 INJECTION, SOLUTION INTRAVENOUS at 12:16

## 2017-01-01 RX ADMIN — GADOBENATE DIMEGLUMINE 15 ML: 529 INJECTION, SOLUTION INTRAVENOUS at 19:35

## 2017-01-01 RX ADMIN — Medication 10 ML: at 22:31

## 2017-01-01 RX ADMIN — DIPHENHYDRAMINE HYDROCHLORIDE 25 MG: 50 INJECTION, SOLUTION INTRAMUSCULAR; INTRAVENOUS at 10:38

## 2017-01-01 RX ADMIN — SODIUM CHLORIDE 1000 ML: 900 INJECTION, SOLUTION INTRAVENOUS at 11:49

## 2017-01-01 RX ADMIN — Medication 10 ML: at 22:00

## 2017-01-01 RX ADMIN — HYDROMORPHONE HYDROCHLORIDE 1 MG: 1 INJECTION, SOLUTION INTRAMUSCULAR; INTRAVENOUS; SUBCUTANEOUS at 05:25

## 2017-01-01 RX ADMIN — Medication 10 ML: at 07:45

## 2017-01-01 RX ADMIN — POTASSIUM CHLORIDE 20 MEQ: 14.9 INJECTION, SOLUTION INTRAVENOUS at 09:20

## 2017-01-01 RX ADMIN — Medication 300 UNITS: at 13:28

## 2017-01-01 RX ADMIN — CYCLOBENZAPRINE HYDROCHLORIDE 10 MG: 10 TABLET, FILM COATED ORAL at 08:36

## 2017-01-01 RX ADMIN — ENOXAPARIN SODIUM 40 MG: 40 INJECTION SUBCUTANEOUS at 16:14

## 2017-01-01 RX ADMIN — BEVACIZUMAB 381 MG: 400 INJECTION, SOLUTION INTRAVENOUS at 08:55

## 2017-01-01 RX ADMIN — CYCLOBENZAPRINE HYDROCHLORIDE 10 MG: 10 TABLET, FILM COATED ORAL at 07:35

## 2017-01-01 RX ADMIN — DIPHENHYDRAMINE HYDROCHLORIDE 25 MG: 25 CAPSULE ORAL at 14:26

## 2017-01-01 RX ADMIN — CYCLOBENZAPRINE HYDROCHLORIDE 10 MG: 10 TABLET, FILM COATED ORAL at 16:09

## 2017-01-01 RX ADMIN — ONDANSETRON 8 MG: 2 INJECTION INTRAMUSCULAR; INTRAVENOUS at 10:22

## 2017-01-01 RX ADMIN — MAGNESIUM HYDROXIDE 30 ML: 400 SUSPENSION ORAL at 08:08

## 2017-01-01 RX ADMIN — PANTOPRAZOLE SODIUM 40 MG: 40 TABLET, DELAYED RELEASE ORAL at 07:35

## 2017-01-01 RX ADMIN — FENTANYL CITRATE 50 MCG: 50 INJECTION, SOLUTION INTRAMUSCULAR; INTRAVENOUS at 09:42

## 2017-01-01 RX ADMIN — HYDROMORPHONE HYDROCHLORIDE 1 MG: 1 INJECTION, SOLUTION INTRAMUSCULAR; INTRAVENOUS; SUBCUTANEOUS at 06:41

## 2017-01-01 RX ADMIN — SODIUM CHLORIDE 100 ML/HR: 900 INJECTION, SOLUTION INTRAVENOUS at 17:59

## 2017-01-01 RX ADMIN — ALPRAZOLAM 0.25 MG: 0.5 TABLET ORAL at 12:38

## 2017-01-01 RX ADMIN — PIPERACILLIN SODIUM,TAZOBACTAM SODIUM 3.38 G: 3; .375 INJECTION, POWDER, FOR SOLUTION INTRAVENOUS at 09:57

## 2017-01-01 RX ADMIN — DEXTROSE MONOHYDRATE, SODIUM CHLORIDE, AND POTASSIUM CHLORIDE 100 ML/HR: 50; 4.5; 1.49 INJECTION, SOLUTION INTRAVENOUS at 03:56

## 2017-01-01 RX ADMIN — POTASSIUM CHLORIDE 20 MEQ: 14.9 INJECTION, SOLUTION INTRAVENOUS at 11:24

## 2017-01-01 RX ADMIN — HYDROMORPHONE HYDROCHLORIDE: 2 INJECTION INTRAMUSCULAR; INTRAVENOUS; SUBCUTANEOUS at 16:07

## 2017-01-01 RX ADMIN — OXYCODONE HYDROCHLORIDE AND ACETAMINOPHEN 1 TABLET: 10; 325 TABLET ORAL at 11:47

## 2017-01-01 RX ADMIN — Medication 10 ML: at 05:39

## 2017-01-01 RX ADMIN — FAMOTIDINE 20 MG: 10 INJECTION, SOLUTION INTRAVENOUS at 09:24

## 2017-01-01 RX ADMIN — SODIUM CHLORIDE 100 ML/HR: 900 INJECTION, SOLUTION INTRAVENOUS at 00:14

## 2017-01-01 RX ADMIN — METOPROLOL TARTRATE 50 MG: 50 TABLET ORAL at 22:00

## 2017-01-01 RX ADMIN — DEXAMETHASONE SODIUM PHOSPHATE 10 MG: 10 INJECTION INTRAMUSCULAR; INTRAVENOUS at 08:51

## 2017-01-01 RX ADMIN — POTASSIUM CHLORIDE 20 MEQ: 20 TABLET, EXTENDED RELEASE ORAL at 17:16

## 2017-01-01 RX ADMIN — MAGNESIUM HYDROXIDE 30 ML: 400 SUSPENSION ORAL at 08:17

## 2017-01-01 RX ADMIN — LEVOFLOXACIN 750 MG: 5 INJECTION, SOLUTION INTRAVENOUS at 22:47

## 2017-01-01 RX ADMIN — Medication 10 ML: at 05:33

## 2017-01-01 RX ADMIN — MORPHINE SULFATE 1 MG: 4 INJECTION, SOLUTION INTRAMUSCULAR; INTRAVENOUS at 05:17

## 2017-01-01 RX ADMIN — SODIUM CHLORIDE 100 ML: 900 INJECTION, SOLUTION INTRAVENOUS at 10:39

## 2017-01-01 RX ADMIN — PIPERACILLIN SODIUM,TAZOBACTAM SODIUM 4.5 G: 4; .5 INJECTION, POWDER, FOR SOLUTION INTRAVENOUS at 06:11

## 2017-01-01 RX ADMIN — DRONABINOL 5 MG: 2.5 CAPSULE ORAL at 17:03

## 2017-01-01 RX ADMIN — METOPROLOL TARTRATE 50 MG: 50 TABLET ORAL at 08:07

## 2017-01-01 RX ADMIN — SODIUM CHLORIDE 100 ML/HR: 900 INJECTION, SOLUTION INTRAVENOUS at 21:28

## 2017-01-01 RX ADMIN — PANTOPRAZOLE SODIUM 40 MG: 40 TABLET, DELAYED RELEASE ORAL at 05:35

## 2017-01-01 RX ADMIN — OXYCODONE HYDROCHLORIDE AND ACETAMINOPHEN 1 TABLET: 10; 325 TABLET ORAL at 06:38

## 2017-01-01 RX ADMIN — Medication 10 ML: at 14:15

## 2017-01-01 RX ADMIN — DOCUSATE SODIUM 100 MG: 100 CAPSULE, LIQUID FILLED ORAL at 08:14

## 2017-01-18 NOTE — PROGRESS NOTES
Pt arrived ambulatory today at 0710, to receive IV chemotherapy. Avastin held due to elevated urine protein, per Dr. Anisa Power, pharmacist.  Pt tolerated without difficulty. Patient discharged via ambulatory accompanied by daughter. Instructed to notify physician of any problems, questions or concerns. Allowed opportunity for patient/family to ask questions. Verbalized understanding. Next appointment is Jan 20 at 1600 with Alyson Rush.

## 2017-01-18 NOTE — PROGRESS NOTES
Problem: Chemotherapy Treatment  Goal: *Chemotherapy regimen followed  Outcome: Progressing Towards Goal  Pt verbalizes/demonstrates understanding of purpose/procedure/potential side effects of oxaliplatin/leucovorin/avastin/fluorouracil.

## 2017-01-20 NOTE — PROGRESS NOTES
Arrived to the [facility]. Assessment completed. Patient tolerated IV fluids well today. Patient's fluorouracil pump was disconnected from her port today, without difficulty. Any issues or concerns during appointment:  Noted orthostatic hypotension today, when patient arrived. The one liter of normal saline was given, due to this. She states that she also has been experiencing some nausea, but takes the anti-nausea medications at home with good relief. Patient aware of next infusion appointment on 1/24/17 (date) at 1500 (time) with IV infusion center. Discharged via [address], with spouse. Patient instructed to call Dr. Loera Gadsden office immediately for any problems or concerns. She verbalizes understanding.

## 2017-02-01 NOTE — PROGRESS NOTES
Arrived to the [facility]. Avastin and FOLFOX completed.  Adrucil pump infusing at discharge Patient tolerated   Without problems   Any issues or concerns during appointment: no  Patient aware of next infusion appointment on 2/3/17 at 1600  Discharged ambulatory with her spouse

## 2017-02-03 NOTE — PROGRESS NOTES
Problem: Knowledge Deficit  Goal: *Verbalizes understanding of procedures and medications  Outcome: Progressing Towards Goal  Review plan of care  Review patient education  Monitor for reactions

## 2017-02-03 NOTE — PROGRESS NOTES
Arrived to the [facility]. Chemo pump completed. Patient tolerated well. Patient arrived with chemo pump from home. Pump infused prior to arrival.  Pump disconnected using chemo precautions and cassette discarded into chemogator. Any issues or concerns during appointment: None  Patient aware of next infusion appointment on 2/15 (date) at [address] [place] [address] (time). Discharged ambulatory instable condition.

## 2017-02-18 NOTE — PROGRESS NOTES
Pt arrived to infusion center ambulatory. Pt added on by Chris Pierre NP for IVF/ labs. Pt reports fever last night, but afebrile today. 1 liter NS infused. Chris Pierre NP notified of lab results. Pt instructed to increase oral KDUR to 40 meq BID x 3 days, then resume current dose. Levaquin called to patients pharmacy. Pt and  verbalize understanding of med changes and when to call provider. Pt and  aware of next infusion appt 2/21 @ 0740 labs 0800 infusion. Discharged to home ambulatory. No distress noted.

## 2017-02-21 PROBLEM — R50.9 FEVER AND CHILLS: Status: ACTIVE | Noted: 2017-01-01

## 2017-02-21 PROBLEM — R52 PAIN: Status: ACTIVE | Noted: 2017-01-01

## 2017-02-21 PROBLEM — R69 SICK: Status: ACTIVE | Noted: 2017-01-01

## 2017-02-21 NOTE — ED NOTES
TRANSFER - OUT REPORT:    Verbal report given to [address] on [place]  being transferred to 5th floor for routine progression of care       Report consisted of patients Situation, Background, Assessment and   Recommendations(SBAR). Information from the following report(s) SBAR, ED Summary and MAR was reviewed with the receiving nurse. Lines:   Venous Access Device left single lumen port 09/22/16 Upper chest (subclavicular area), left (Active)        Opportunity for questions and clarification was provided.       Patient transported with:   O2 @ 2-5 liters
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
## 2017-02-21 NOTE — IP AVS SNAPSHOT
Maggy Abdul 
 
 
 [address] [facility] 
[phone] Patient: Philippa Eisenmenger MRN: [id number] :1960 You are allergic to the following No active allergies Recent Documentation Height  
  
  
  
  
  
 1.651 m Emergency Contacts Name Discharge Info Relation Home Work Mobile LeeJyoti DISCHARGE CAREGIVER [3] Daughter [21] [id number] About your hospitalization You were admitted on:  2017 You last received care in the:  [address] You were discharged on:  2017 Unit phone number:  [phone] Why you were hospitalized Your primary diagnosis was:  Fever And Chills Your diagnoses also included:  Sick, Pain Providers Seen During Your Hospitalizations Provider Role Specialty Primary office phone Charles March MD Attending Provider Emergency Medicine [phone] Luke Vickers MD Attending Provider Hematology [phone] Your Primary Care Physician (PCP) Primary Care Physician Office Phone Office Fax Nay Carr [phone] Follow-up Information Follow up With Details Comments Contact Info Minnie Aldridge, DO   [address] 
[address] [facility] [address] [phone] Luke Vickers MD On 3/9/2017 LABS 11:15 AM AND 11:45 AM .PER LENO . [address] [facility] [address] [facility] [address] 
[phone] Your Appointments 2017 12:15 PM EST  
RADIATION ONCOLOGY with [address] ([facility]) [address] [address] [facility] [address] [phone] 2017  4:15 PM EST  
MR BRAIN W WO CON with [facility] MRI UNIT 1 [facility] ([address] [facility] [address]  
[phone] IMPLANTS - Bring information (ID card) for any medically implanted devices. - Patients with a history of metal in eyes will require orbit x-rays for clearance prior to MRI  PATIENT ARRIVAL - Please arrive 30 minutes early to check in. Tuesday February 28, 2017  9:30 AM EST  
RADIATION ONCOLOGY with [address] ([address]) [address] [facility] [address] [facility] [address]  
[phone] Wednesday March 01, 2017 10:15 AM EST  
RADIATION ONCOLOGY with [address] ([address]) [address] [facility] [address] [facility] [address]  
[phone] Thursday March 02, 2017 12:15 PM EST  
RADIATION ONCOLOGY with [address] ([address]) [address] [facility] [address] [facility] [address]  
[phone] Friday March 03, 2017  9:15 AM EST  
RADIATION ONCOLOGY with [address] ([address]) [address] [facility] [address] [facility] [address]  
[phone] Monday March 06, 2017 12:30 PM EST  
RADIATION ONCOLOGY with [address] ([address]) [address] [facility] [address] [facility] [address]  
[phone] Tuesday March 07, 2017  9:00 AM EST  
LAB with [address]  
9018 [facility] OUTREACH INSURANCE [address]) Ray Fritz [address] [place] [address]  
[phone] Tuesday March 07, 2017  9:30 AM EST PreChemo Follow Up with [facility] NP1 [facility] Hematology and Oncology [facility]) LELE/ Leland Munoz [address] [facility] [address]  
[phone] Tuesday March 07, 2017 10:00 AM EST Chemo with [facility] [address] ([address]) [address] Barrie Balderas [phone] [facility] [address]  
[phone] [address] Tuesday March 07, 2017 12:30 PM EST  
RADIATION ONCOLOGY with [address] ([address]) [address] [employer] [address] [facility] [address]  
[phone] Wednesday March 08, 2017 12:15 PM EST  
RADIATION ONCOLOGY with [address] ([address]) [address] [employer] [address] [facility] [address]  
[phone] Thursday March 09, 2017  3:00 PM EST Chemo with [place] [address]) [address] Barrie Balderas [phone] [facility] [address]  
[phone] [address] Tuesday March 21, 2017  8:15 AM EDT  
LAB with [address]  
1808 [facility] OUTREACH INSURANCE [address]) Ray Fritz [address] [place] [address]  
[phone] Tuesday March 21, 2017  8:45 AM EDT PreChemo Follow Up with Lyndol Pike, MD Romayne Duster Hematology and Oncology [facility] C/ Leland Munoz 33 [facility] [address]  
[phone] Tuesday March 21, 2017  9:15 AM EDT Chemo with NUS10  
ST. [address] ([address]) [address] Barrie Balderas [phone] [facility] [address]  
[phone] [address] Thursday March 23, 2017  1:15 PM EDT Chemo with Chrystie Sicks. [address] ([address]) [address] Barrie Balderas [phone] [facility] [address]  
[phone] [address] Current Discharge Medication List  
 START taking these medications Dose & Instructions Dispensing Information Comments Morning Noon Evening Bedtime  
 predniSONE 10 mg tablet Commonly known as:  Annalisa Vick Your next dose is: Today, Tomorrow Other:  _________ Dose:  10 mg Take 1 Tab by mouth daily. Quantity:  30 Tab Refills:  0 CONTINUE these medications which have CHANGED Dose & Instructions Dispensing Information Comments Morning Noon Evening Bedtime HYDROcodone-acetaminophen 5-325 mg per tablet Commonly known as:  Darius Bustamante What changed:   
- how much to take 
- reasons to take this Your next dose is: Today, Tomorrow Other:  _________ Dose:  2 Tab Take 2 Tabs by mouth every six (6) hours as needed. Max Daily Amount: 8 Tabs. Quantity:  240 Tab Refills:  0 PARoxetine 40 mg tablet Commonly known as:  PAXIL What changed:   
- medication strength 
- how much to take Your next dose is: Today, Tomorrow Other:  _________ Dose:  40 mg Take 1 Tab by mouth daily. Indications: GENERALIZED ANXIETY DISORDER, PANIC DISORDER Quantity:  30 Tab Refills:  2  
     
   
   
   
  
 potassium chloride SA 10 mEq capsule Commonly known as:  Ana Lilia Solitario What changed:  Another medication with the same name was removed. Continue taking this medication, and follow the directions you see here. Your next dose is: Today, Tomorrow Other:  _________ Dose:  20 mEq Take 2 Caps by mouth two (2) times a day. Quantity:  120 Cap Refills:  1 CONTINUE these medications which have NOT CHANGED Dose & Instructions Dispensing Information Comments Morning Noon Evening Bedtime ALPRAZolam 0.25 mg tablet Commonly known as:  Koby Beecham Your next dose is: Today, Tomorrow Other:  _________  Dose:  0.25 mg  
 Take 1 Tab by mouth nightly as needed for Anxiety. Max Daily Amount: 0.25 mg.  
 Quantity:  20 Tab Refills:  0  
     
   
   
   
  
 cyclobenzaprine 5 mg tablet Commonly known as:  FLEXERIL Your next dose is: Today, Tomorrow Other:  _________ Dose:  10 mg Take 10 mg by mouth three (3) times daily as needed for Muscle Spasm(s). Refills:  0  
     
   
   
   
  
 lidocaine-prilocaine topical cream  
Commonly known as:  EMLA Your next dose is: Today, Tomorrow Other:  _________ Apply  to affected area as needed. Apply 1/2 inch cream to port site 90 minutes prior to access Quantity:  30 g Refills:  0 LORazepam 1 mg tablet Commonly known as:  ATIVAN Your next dose is: Today, Tomorrow Other:  _________ Dose:  1 mg Take 1 Tab by mouth every four (4) hours as needed for Anxiety. Max Daily Amount: 6 mg. Quantity:  120 Tab Refills:  1  
     
   
   
   
  
 magic mouthwash Susp Commonly known as:  [place] Your next dose is: Today, Tomorrow Other:  _________ Dose:  10 mL Take 10 mL by mouth every four (4) hours. Quantity:  500 mL Refills:  2  
     
   
   
   
  
 ondansetron 4 mg disintegrating tablet Commonly known as:  ZOFRAN ODT Your next dose is: Today, Tomorrow Other:  _________ Dose:  4 mg Take 1 Tab by mouth every eight (8) hours as needed for Nausea. Quantity:  8 Tab Refills:  2  
     
   
   
   
  
 ondansetron hcl 8 mg tablet Commonly known as:  Hoa Bene Your next dose is: Today, Tomorrow Other:  _________ Dose:  8 mg Take 1 Tab by mouth every eight (8) hours as needed for Nausea. Quantity:  90 Tab Refills:  3  
     
   
   
   
  
 prochlorperazine 10 mg tablet Commonly known as:  COMPAZINE Your next dose is: Today, Tomorrow Other:  _________  Dose:  10 mg  
 Take 1 Tab by mouth every six (6) hours as needed. Quantity:  120 Tab Refills:  3 STOP taking these medications   
 levoFLOXacin 500 mg tablet Commonly known as:  Cristi Back Where to Get Your Medications These medications were sent to [address] AT [address], [address] [facility] [address] [address] Phone:  [phone] PARoxetine 40 mg tablet  
 predniSONE 10 mg tablet Information on where to get these meds will be given to you by the nurse or doctor. ! Ask your nurse or doctor about these medications HYDROcodone-acetaminophen 5-325 mg per tablet Discharge Instructions DISCHARGE SUMMARY from Nurse The following personal items are in your possession at time of discharge: 
 
Dental Appliances: None Visual Aid: None Home Medications: None Jewelry: Kaushik Cardoso Clothing: Footwear, Pants, Shirt, Socks, Undergarments, With patient Other Valuables: Harvey PATIENT INSTRUCTIONS: 
 
 
F-face looks uneven A-arms unable to move or move unevenly S-speech slurred or non-existent T-time-call 911 as soon as signs and symptoms begin-DO NOT go Back to bed or wait to see if you get better-TIME IS BRAIN. Warning Signs of HEART ATTACK Call 911 if you have these symptoms: 
? Chest discomfort. Most heart attacks involve discomfort in the center of the chest that lasts more than a few minutes, or that goes away and comes back. It can feel like uncomfortable pressure, squeezing, fullness, or pain. ? Discomfort in other areas of the upper body. Symptoms can include pain or discomfort in one or both arms, the back, neck, jaw, or stomach. ? Shortness of breath with or without chest discomfort. ? Other signs may include breaking out in a cold sweat, nausea, or lightheadedness. Don't wait more than five minutes to call [address]! Fast action can save your life. Calling 911 is almost always the fastest way to get lifesaving treatment. Emergency Medical Services staff can begin treatment when they arrive  up to an hour sooner than if someone gets to the hospital by car. The discharge information has been reviewed with the patient. The patient verbalized understanding. Discharge medications reviewed with the patient and appropriate educational materials and side effects teaching were provided. Discharge Orders None Introducing \[facility] [place] [facility]\"" & [facility]! Dear Svitlana Gaviria: 
Thank you for requesting a If You Can account. Our records indicate that you already have an active If You Can account. You can access your account anytime at [email or web address]. [email or web address] Did you know that you can access your hospital and ER discharge instructions at any time in If You Can? You can also review all of your test results from your hospital stay or ER visit. Additional Information If you have questions, please visit the Frequently Asked Questions section of the If You Can website at [email or web address] Remember, If You Can is NOT to be used for urgent needs. For medical emergencies, dial 911. Now available from your iPhone and Android! General Information Please provide this summary of care documentation to your next provider. Patient Signature:  ____________________________________________________________ Date:  ____________________________________________________________  
  
Hunter Ybarra  Provider Signature: ____________________________________________________________ Date:  ____________________________________________________________

## 2017-02-21 NOTE — ED TRIAGE NOTES
Pt states ongoing weakness and fever. Pt has hx of colon ca. Pt reports lower back pain and right lower leg pain.

## 2017-02-21 NOTE — PROGRESS NOTES
Pharmacokinetic Consult to Pharmacist    Kaylene Olivera is a 64 y.o. female being treated for Fever of unknown origin with Vancomycin and Zosyn. The patient has known metastatic colon cancer and is s/p cycle 8 of FOLFOX/Avastin. The patient has had continued fevers despite PO levaquin X 3 days prior to admission. The patient is not currently neutropenic. Height: 5' 5\" (165.1 cm)  Weight: 77.1 kg (170 lb)  Lab Results   Component Value Date/Time    BUN 13 02/21/2017 11:34 AM    Creatinine 0.86 02/21/2017 11:34 AM    WBC 5.6 02/21/2017 11:34 AM      Estimated Creatinine Clearance: 75 mL/min (based on Cr of 0.86). CULTURES:  2/21   Influenza screen  Negative      Day 1 of vancomycin. Goal trough is 15-20. Vancomycin dose initiated at 1250 mg Q12H. Will plan to check vancomycin trough level prior to the 4th dose. Will continue to follow patient.       Thank you,  Serene Boyec, PharmD  Clinical Pharmacist  [phone]

## 2017-02-21 NOTE — IP AVS SNAPSHOT
Current Discharge Medication List  
  
Take these medications at their scheduled times Dose & Instructions Dispensing Information Comments Morning Noon Evening Bedtime  
 magic mouthwash Susp Commonly known as:  Makeda Darkalyani Your next dose is: Today, Tomorrow Other:  ____________ Dose:  10 mL Take 10 mL by mouth every four (4) hours. Quantity:  500 mL Refills:  2 PARoxetine 40 mg tablet Commonly known as:  PAXIL Your next dose is: Today, Tomorrow Other:  ____________ Dose:  40 mg Take 1 Tab by mouth daily. Indications: GENERALIZED ANXIETY DISORDER, PANIC DISORDER Quantity:  30 Tab Refills:  2  
     
   
   
   
  
 potassium chloride SA 10 mEq capsule Commonly known as:  Garrett Caitlyn Your next dose is: Today, Tomorrow Other:  ____________ Dose:  20 mEq Take 2 Caps by mouth two (2) times a day. Quantity:  120 Cap Refills:  1  
     
   
   
   
  
 predniSONE 10 mg tablet Commonly known as:  Mag Esters Your next dose is: Today, Tomorrow Other:  ____________ Dose:  10 mg Take 1 Tab by mouth daily. Quantity:  30 Tab Refills:  0 Take these medications as needed Dose & Instructions Dispensing Information Comments Morning Noon Evening Bedtime ALPRAZolam 0.25 mg tablet Commonly known as:  Ashlie Nayla Your next dose is: Today, Tomorrow Other:  ____________ Dose:  0.25 mg Take 1 Tab by mouth nightly as needed for Anxiety. Max Daily Amount: 0.25 mg.  
 Quantity:  20 Tab Refills:  0  
     
   
   
   
  
 cyclobenzaprine 5 mg tablet Commonly known as:  FLEXERIL Your next dose is: Today, Tomorrow Other:  ____________ Dose:  10 mg Take 10 mg by mouth three (3) times daily as needed for Muscle Spasm(s). Refills:  0 HYDROcodone-acetaminophen 5-325 mg per tablet Commonly known as:  Benetta Mohit Your next dose is: Today, Tomorrow Other:  ____________ Dose:  2 Tab Take 2 Tabs by mouth every six (6) hours as needed. Max Daily Amount: 8 Tabs. Quantity:  240 Tab Refills:  0  
     
   
   
   
  
 lidocaine-prilocaine topical cream  
Commonly known as:  EMLA Your next dose is: Today, Tomorrow Other:  ____________ Apply  to affected area as needed. Apply 1/2 inch cream to port site 90 minutes prior to access Quantity:  30 g Refills:  0 LORazepam 1 mg tablet Commonly known as:  ATIVAN Your next dose is: Today, Tomorrow Other:  ____________ Dose:  1 mg Take 1 Tab by mouth every four (4) hours as needed for Anxiety. Max Daily Amount: 6 mg. Quantity:  120 Tab Refills:  1  
     
   
   
   
  
 ondansetron 4 mg disintegrating tablet Commonly known as:  ZOFRAN ODT Your next dose is: Today, Tomorrow Other:  ____________ Dose:  4 mg Take 1 Tab by mouth every eight (8) hours as needed for Nausea. Quantity:  8 Tab Refills:  2  
     
   
   
   
  
 ondansetron hcl 8 mg tablet Commonly known as:  Pablo Carpio Your next dose is: Today, Tomorrow Other:  ____________ Dose:  8 mg Take 1 Tab by mouth every eight (8) hours as needed for Nausea. Quantity:  90 Tab Refills:  3  
     
   
   
   
  
 prochlorperazine 10 mg tablet Commonly known as:  COMPAZINE Your next dose is: Today, Tomorrow Other:  ____________ Dose:  10 mg Take 1 Tab by mouth every six (6) hours as needed. Quantity:  120 Tab Refills:  3 Where to Get Your Medications These medications were sent to [address] AT [address], [address] [place] [address] Phone:  [phone] PARoxetine 40 mg tablet  
 predniSONE 10 mg tablet Information about where to get these medications is not yet available ! Ask your nurse or doctor about these medications HYDROcodone-acetaminophen 5-325 mg per tablet

## 2017-02-22 NOTE — PROGRESS NOTES
Inpatient Hematology / Oncology Progress Note      Admission Date: 2017 11:06 AM  Reason for Admission/Hospital Course: Sick  Fever and chills  Pain      24 Hour Events:  Feeling better than yesterday  Afebrile, VSS  Back pain continues  MRI brain-clear  MRI LS- shows ? metastatic disease at L5 and left ilium      ROS:  Constitutional: Positive for weakness and fatigue; negative for fever, chills. CV: Negative for chest pain, palpitations, edema. Respiratory: Negative for dyspnea, cough, wheezing. GI: Negative for nausea, abdominal pain, diarrhea. 10 point review of systems is otherwise negative with the exception of the elements mentioned above in the HPI. No Known Allergies    OBJECTIVE:  Patient Vitals for the past 8 hrs:   BP Temp Pulse Resp SpO2   17 0720 121/69 98.3 °F (36.8 °C) (!) 119 18 96 %     Temp (24hrs), Av °F (36.7 °C), Min:97.3 °F (36.3 °C), Max:98.5 °F (36.9 °C)     07 -  1900  In: 240 [P.O.:240]  Out: -     Physical Exam:  Constitutional: Well developed, well nourished female in no acute distress, sitting comfortably in the hospital bed. HEENT: Normocephalic and atraumatic. Oropharynx is clear, mucous membranes are moist.  Sclerae anicteric. Neck supple without JVD. No thyromegaly present. Lymph node   Deferred. Skin Warm and dry. No bruising and no rash noted. No erythema. No pallor. Respiratory Lungs are clear to auscultation bilaterally without wheezes, rales or rhonchi, normal air exchange without accessory muscle use. CVS Normal rate, regular rhythm and normal S1 and S2. No murmurs, gallops, or rubs. Abdomen Soft, nontender and nondistended, normoactive bowel sounds. No palpable mass. No hepatosplenomegaly. Neuro Grossly nonfocal with no obvious sensory or motor deficits. MSK Normal range of motion in general.  No edema and no tenderness. Psych Appropriate mood and affect.         Labs:    Recent Labs      17   0350 02/21/17   1134   WBC  4.4  5.6   RBC  3.27*  3.55*   HGB  7.5*  8.2*   HCT  26.1*  27.8*   MCV  79.8  78.3*   MCH  22.9*  23.1*   MCHC  28.7*  29.5*   RDW  19.5*  19.4*   PLT  238  235   GRANS  64  73   LYMPH  19  20   MONOS  12  5   EOS  3  1   BASOS  1  0   IG  0.7  0.7   DF  AUTOMATED  AUTOMATED   ANEU  2.8  4.1   ABL  0.8  1.1   ABM  0.5  0.3   SUN  0.1  0.1   ABB  0.0  0.0   AIG  0.0  0.0      Recent Labs      02/22/17   0350  02/21/17   1134   NA  144  138   K  4.1  4.1   CL  107  103   CO2  28  26   AGAP  9  9   GLU  80  90   BUN  13  13   CREA  0.85  0.86   GFRAA  >60  >60   GFRNA  >60  >60   CA  9.4  9.5   SGOT  24  18   AP  100  107   TP  6.7  7.4   ALB  2.4*  2.6*   GLOB  4.3*  4.8*   AGRAT  0.6*  0.5*   MG  2.3   --          Imaging:  MRI LUMBAR SPINE WITHOUT AND WITH CONTRAST 2/21/2017     HISTORY: Fever headache and low back pain. Metastatic colon cancer. Leg  weakness.     TECHNIQUE: Sagittal T2-weighted, T1-weighted, STIR and postcontrast fat sat  T1-weighted images were obtained from T11 through S3. Axial T2-weighted,  T1-weighted and postcontrast fat sat T1-weighted images were obtained from  T12-L1 through L5-S1. 15 mL MultiHance gadolinium was used.     COMPARISON: CT chest abdomen and pelvis 1/5/2017     FINDINGS: There is abnormal marrow signal within the L5 vertebral body. Abnormal  marrow signal is also present within the left ilium. Findings suggest osseous  metastatic disease.      The lumbar vertebrae are normal in height. The conus terminates at L1-L2 disc  level.  There is no abnormal leptomeningeal enhancement.     Axial images: Extensive retroperitoneal adenopathy is present compatible with  scarlett metastatic disease.     T12-L1: No significant spinal stenosis or foraminal stenosis.     L1-L2: No significant spinal stenosis or foraminal stenosis.     L2-L3: No significant spinal stenosis or foraminal stenosis.     L3-L4: No significant spinal stenosis or foraminal stenosis.     L4-L5: Facet degenerative change. No significant spinal stenosis. Mild right  foraminal stenosis.     L5-S1: A small amount of soft tissue is present in the ventral epidural space. This is likely epidural extension of tumor. No significant spinal stenosis or  foraminal stenosis.       IMPRESSION  IMPRESSION:     1. Osseous metastatic disease at L5 with small amount of tumor in the ventral  epidural fat.     2. Additional osseous metastatic disease in the left ilium.     3. Facet degenerative change with mild right foraminal stenosis at L5-S1.     4. Retroperitoneal adenopathy. ASSESSMENT:    Problem List  Date Reviewed: 2/14/2017          Codes Class Noted    Sick ICD-10-CM: R69  ICD-9-CM: 799.9  2/21/2017        * (Principal)Fever and chills ICD-10-CM: R50.9  ICD-9-CM: 780.60  2/21/2017        Pain ICD-10-CM: R52  ICD-9-CM: 780.96  2/21/2017        Tachycardia ICD-10-CM: R00.0  ICD-9-CM: 785.0  11/15/2016        Hypokalemia ICD-10-CM: E87.6  ICD-9-CM: 276.8  11/15/2016        Anemia associated with chemotherapy ICD-10-CM: D64.81, T45.1X5A  ICD-9-CM: 285.3, E933.1  11/15/2016        Malignant neoplasm of colon [facility]) ICD-10-CM: C18.9  ICD-9-CM: 153.9  9/26/2016        Lymphadenopathy ICD-10-CM: R59.1  ICD-9-CM: 785.6  9/9/2016                PLAN:  Metastatic Colon Cancer  -s/p C8 FOLFOX/Avastin     Fever (non neutropenic) s/p PO levaquin x 3 days  -IVF, Vanc/Zosyn, monitor fever curve  -2/22 Afebrile, continue antibiotics     Headaches/Lower back pain (Bone Mets)  -MRI brain and LS, continue home pain med doses, Morphine IV prn   -2/22 low back pain continues, 7/10 pain, consult radiation oncology      Supportive Care  -Follow [place] SOPs  -consult PT    Disposition:  Mrs Rahat Whatley continues to have lower back pain and now has MRI evidence of disease at L5 and left ilium. She was sent over to [facility] today for consultation for palliative treatment. She admits to not eating and drinking well.   The importance of good nutrition and hydration was discussed in relation to her weakness. She was also encouraged to work aggressively with PT while here to increase strength.   She needs to get up out of the bed.                  Maricarmen Siddiqui NP   [address]  Office : [phone]  Fax : [phone]

## 2017-02-22 NOTE — PROGRESS NOTES
END OF SHIFT NOTE:    Intake/Output      Voiding: YES  Catheter: NO  Drain:              Stool:  0 occurrences. Emesis:  0 occurrences. VITAL SIGNS  Patient Vitals for the past 12 hrs:   Temp Pulse Resp BP SpO2   02/22/17 0402 98.5 °F (36.9 °C) (!) 105 18 117/74 92 %   02/22/17 0143 97.3 °F (36.3 °C) - - - -   02/21/17 2330 98.2 °F (36.8 °C) (!) 110 18 124/76 94 %   02/21/17 2025 97.4 °F (36.3 °C) 91 16 138/78 94 %   02/21/17 1911 - - - 145/61 -       Pain Assessment  Pain 1  Pain Scale 1: Visual (02/22/17 0230)  Pain Intensity 1: 0 (pt sleeping) (02/22/17 0230)  Patient Stated Pain Goal: 0 (02/21/17 1100)  Pain Reassessment 1: Yes (02/22/17 0230)  Pain Onset 1: today (02/22/17 0148)  Pain Location 1: Back (02/22/17 0148)  Pain Orientation 1: Lower (02/22/17 0148)  Pain Description 1: Aching (02/22/17 0148)  Pain Intervention(s) 1: Medication (see MAR) (02/22/17 0148)    Ambulating  Yes    Additional Information:     Shift report given to oncoming nurse at the bedside.     Loly Ramos

## 2017-02-22 NOTE — PROGRESS NOTES
END OF SHIFT NOTE:    Intake/Output  02/22 0701 - 02/22 1900  In: 0 [P.O.:240; I.V.:350]  Out: -    Voiding: YES  Catheter: NO  Drain:              Stool:  0 occurrences. Emesis:  0 occurrences. VITAL SIGNS  Patient Vitals for the past 12 hrs:   Temp Pulse Resp BP SpO2   02/22/17 1530 97.6 °F (36.4 °C) 92 18 105/65 96 %   02/22/17 0720 98.3 °F (36.8 °C) (!) 119 18 121/69 96 %       Pain Assessment  Pain 1  Pain Scale 1: Numeric (0 - 10) (02/22/17 1642)  Pain Intensity 1: 8 (02/22/17 1642)  Patient Stated Pain Goal: 0 (02/21/17 1100)  Pain Reassessment 1: Yes (02/22/17 1635)  Pain Onset 1: today (02/22/17 0148)  Pain Location 1: Back (02/22/17 1642)  Pain Orientation 1: Lower (02/22/17 1008)  Pain Description 1: Aching (02/22/17 1642)  Pain Intervention(s) 1: Medication (see MAR) (02/22/17 1642)    Ambulating  Yes    Additional Information: report given to Tiffani Rojas RN    Shift report given to oncoming nurse at the bedside.     Anthony Murrieta RN
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
## 2017-02-23 PROBLEM — R45.89 DIFFICULTY COPING WITH DISEASE: Status: ACTIVE | Noted: 2017-01-01

## 2017-02-23 NOTE — PROGRESS NOTES
Problem: Nutrition Deficit  Goal: *Optimize nutritional status  Nutrition  Reason for assessment: Referral received for general nutrition management and supplementation  Assessment:   Diet order(s): regular  Food/Nutrition Patient History:  Patient with metastatic colon cancer to bone s/p C8 FOLFOX/Avastin. Patient admitted with weakness and fever, and report of eating poorly. Upon RD assessment, patient was eating 100% of [employer] cheeseburger. She reports that she has had no appetite since September and will be intermittently nauseated throughout chemotherapy treatments. Patient reports eating maybe one meal per day with ~2 Muscle Milk per day. RD encouraged supplementation while appetite is poor. Anthropometrics:Height: 5' 5\" (165.1 cm),  Weight: 79 kg (174 lb 3.2 oz), Weight Source: Standing scale (comment), Body mass index is 28.99 kg/(m^2). BMI class of overweight. Macronutrient needs:  EER:  1302-1643 kcal /day (25-28 kcal/kg listed BW)  EPR:  68-85 grams protein/day (1.2-1.5 grams/kg IBW)  Intake/Comparative Standards: Per RD meal rounds: 100% of [employer] cheeseburger. Average intake for past 2 day(s)/1 recorded meal(s): 50%. This potentially meets ~58% of kcal and ~71% of protein needs     Nutrition Diagnosis: Inadequate oral intake r/t decreased ability to consume sufficient oral intake as evidenced by patient with decreased appetite and a reported poor overall intake since initiation of chemotherapy treatment. Intervention:  Meals and snacks: Continue current diet. Nutrition Supplement Therapy: ensure high protein TID  RD provided patient with an ensure and menu.       Markus Ivey [address], [address] [place] [address], [phone]

## 2017-02-23 NOTE — ASSESSMENT & PLAN NOTE
Assessed patient and provided individual session. Will plan to see patient in outpatient psychotherapy group to continue monitoring and treating her difficulty in coping. Recommend continuing paxil. Patient agrees with assessment and plan. See non-sensitive psychology progress note for further details. Electronically Signed By:  Jose Cano Psy.D.   [address] [place] [address]

## 2017-02-23 NOTE — PROGRESS NOTES
NON-SENSITIVE PSYCHOLOGY PROGRESS NOTE      Name:  Sade Mcdonald    Date of Service: 2/23/2017  Location of Service:   512/01  Type of Service: Individual Psychotherapy  Duration:  60 minutes  Primary Diagnosis:   1. Acute febrile illness    2. Near syncope        Summary of Service:  Provided individual psychotherapy services to pt after receipt of consult request.  Patient has h/o panic disorder and generalized anxiety disorder, which occurred after death of her 1st . She has no other prior history of psychiatric complaints or treatments. Patient is not and has never been suicidal. During the session today, the patient expressed a desire for an emotional connection with her medical caregivers, most importantly to establish trust. She is experiencing a fair degree of denial that her colon cancer has metastasized, and her principle reason for this denial is because is not now experiencing pain. I provided gentle psychoeducation about variations in symptom expression to prepare patient for possible changes in the future. Recommend continuing Paxil. She reports that ativan and xanax have been ineffective in the past. No other psych med recommendations at this time. Patient presently meets criteria for Difficulty Coping with illness. Today's session targeted patient's fatalistic cognitions and encouraged use of rational reframing. Her native defense mechanism to stress is use of humor and caring for others, most notably her children and . It will be important for Ms. Rahat Whatley to continue these native defense mechanisms, as well as have an outlet to verbalize her own internal emotional response. Assisted patient in appropriate emotional expression, and joined in her narrative to help her gain a sense of control over her current health process. As patient's present length of stay appears to be short at this time, I have invited Ms. Rahat Whatley as well as her  to attend our outpatient Adjustment Group at the [facility]. I have provided her with a card along with contact information for my . She expressed intent to attend PRN. Will plan to see patient in group post-discharge to treat Difficulty Coping with illness. Patient agrees with plan. Jeannie Dodd Psy.D.   Psychologist

## 2017-02-23 NOTE — PROGRESS NOTES
Inpatient Hematology / Oncology Progress Note      Admission Date: 2017 11:06 AM  Reason for Admission/Hospital Course: Sick  Fever and chills  Pain      24 Hour Events:  Met with RadOnc yesterday and will start XRT today for 10 fractions  Tmax 99.1, VSS  Back pain continues        ROS:  Constitutional: Positive for weakness and fatigue; negative for fever, chills. CV: Negative for chest pain, palpitations, edema. Respiratory: Negative for dyspnea, cough, wheezing. GI: Negative for nausea, abdominal pain, diarrhea. 10 point review of systems is otherwise negative with the exception of the elements mentioned above in the HPI. No Known Allergies    OBJECTIVE:  Patient Vitals for the past 8 hrs:   BP Temp Pulse Resp SpO2   17 0720 127/84 99.4 °F (37.4 °C) (!) 114 18 91 %     Temp (24hrs), Av.7 °F (37.1 °C), Min:97.6 °F (36.4 °C), Max:99.4 °F (37.4 °C)     07 -  1900  In: 240 [P.O.:240]  Out: -     Physical Exam:  Constitutional: Well developed, well nourished female in no acute distress, sitting comfortably in the hospital bed. HEENT: Normocephalic and atraumatic. Oropharynx is clear, mucous membranes are moist.  Sclerae anicteric. Neck supple without JVD. No thyromegaly present. Lymph node   Deferred. Skin Warm and dry. No bruising and no rash noted. No erythema. No pallor. Respiratory Lungs are clear to auscultation bilaterally without wheezes, rales or rhonchi, normal air exchange without accessory muscle use. CVS Normal rate, regular rhythm and normal S1 and S2. No murmurs, gallops, or rubs. Abdomen Soft, nontender and nondistended, normoactive bowel sounds. No palpable mass. No hepatosplenomegaly. Neuro Grossly nonfocal with no obvious sensory or motor deficits. MSK Normal range of motion in general.  No edema and no tenderness. Psych Depressed mood and affect. Tearful at times.        Labs:      Recent Labs      17   0415  17 0350  02/21/17   1134   WBC  6.3  4.4  5.6   RBC  3.27*  3.27*  3.55*   HGB  7.6*  7.5*  8.2*   HCT  25.7*  26.1*  27.8*   MCV  78.6*  79.8  78.3*   MCH  23.2*  22.9*  23.1*   MCHC  29.6*  28.7*  29.5*   RDW  19.5*  19.5*  19.4*   PLT  248  238  235   GRANS  71  64  73   LYMPH  13  19  20   MONOS  13*  12  5   EOS  2  3  1   BASOS  0  1  0   IG  1.0  0.7  0.7   DF  AUTOMATED  AUTOMATED  AUTOMATED   ANEU  4.4  2.8  4.1   ABL  0.8  0.8  1.1   ABM  0.8  0.5  0.3   SUN  0.1  0.1  0.1   ABB  0.0  0.0  0.0   AIG  0.1  0.0  0.0        Recent Labs      02/23/17   0415  02/22/17   0350  02/21/17   1134   NA  142  144  138   K  3.8  4.1  4.1   CL  105  107  103   CO2  28  28  26   AGAP  9  9  9   GLU  90  80  90   BUN  9  13  13   CREA  0.84  0.85  0.86   GFRAA  >60  >60  >60   GFRNA  >60  >60  >60   CA  9.2  9.4  9.5   SGOT  48*  24  18   AP  120  100  107   TP  6.8  6.7  7.4   ALB  2.4*  2.4*  2.6*   GLOB  4.4*  4.3*  4.8*   AGRAT  0.5*  0.6*  0.5*   MG   --   2.3   --          Imaging:  MRI LUMBAR SPINE WITHOUT AND WITH CONTRAST 2/21/2017     HISTORY: Fever headache and low back pain. Metastatic colon cancer. Leg  weakness.     TECHNIQUE: Sagittal T2-weighted, T1-weighted, STIR and postcontrast fat sat  T1-weighted images were obtained from T11 through S3. Axial T2-weighted,  T1-weighted and postcontrast fat sat T1-weighted images were obtained from  T12-L1 through L5-S1. 15 mL MultiHance gadolinium was used.     COMPARISON: CT chest abdomen and pelvis 1/5/2017     FINDINGS: There is abnormal marrow signal within the L5 vertebral body. Abnormal  marrow signal is also present within the left ilium. Findings suggest osseous  metastatic disease.      The lumbar vertebrae are normal in height. The conus terminates at L1-L2 disc  level.  There is no abnormal leptomeningeal enhancement.     Axial images: Extensive retroperitoneal adenopathy is present compatible with  scarlett metastatic disease.     T12-L1: No significant spinal stenosis or foraminal stenosis.     L1-L2: No significant spinal stenosis or foraminal stenosis.     L2-L3: No significant spinal stenosis or foraminal stenosis.     L3-L4: No significant spinal stenosis or foraminal stenosis.     L4-L5: Facet degenerative change. No significant spinal stenosis. Mild right  foraminal stenosis.     L5-S1: A small amount of soft tissue is present in the ventral epidural space. This is likely epidural extension of tumor. No significant spinal stenosis or  foraminal stenosis.       IMPRESSION  IMPRESSION:     1. Osseous metastatic disease at L5 with small amount of tumor in the ventral  epidural fat.     2. Additional osseous metastatic disease in the left ilium.     3. Facet degenerative change with mild right foraminal stenosis at L5-S1.     4. Retroperitoneal adenopathy.     ASSESSMENT:    Problem List  Date Reviewed: 2/14/2017          Codes Class Noted    Sick ICD-10-CM: R69  ICD-9-CM: 799.9  2/21/2017        * (Principal)Fever and chills ICD-10-CM: R50.9  ICD-9-CM: 780.60  2/21/2017        Pain ICD-10-CM: R52  ICD-9-CM: 780.96  2/21/2017        Tachycardia ICD-10-CM: R00.0  ICD-9-CM: 785.0  11/15/2016        Hypokalemia ICD-10-CM: E87.6  ICD-9-CM: 276.8  11/15/2016        Anemia associated with chemotherapy ICD-10-CM: D64.81, T45.1X5A  ICD-9-CM: 285.3, E933.1  11/15/2016        Malignant neoplasm of colon ([facility] [address] ICD-10-CM: C18.9  ICD-9-CM: 153.9  9/26/2016        Lymphadenopathy ICD-10-CM: R59.1  ICD-9-CM: 785.6  9/9/2016                PLAN:  Metastatic Colon Cancer  -s/p C8 FOLFOX/Avastin  -2/23 CEA 41, which is the lowest she has been since diagnosis     Fever (non neutropenic) s/p PO levaquin x 3 days  -IVF, Vanc/Zosyn, monitor fever curve  -2/22 Afebrile, continue antibiotics  -2/23 Tmax 99.1, continue Vanc/Zosyn     Headaches/Lower back pain (Bone Mets)  -MRI brain and LS, continue home pain med doses, Morphine IV prn   -2/22 low back pain continues, 7/10 pain, consult radiation oncology   -2/23 XRT to start today for 10 fractions     Supportive Care  -Follow Jenelle SOPs  -continue aggressive PT  -consult nutrition    Disposition:  Mrs De Santiago  continues to have lower back pain and now has MRI evidence of disease at L5 and left ilium. She will begin XRT for 10 fractions today. She continues to not eat and drink well. The importance of good nutrition and hydration was discussed once again today in relation to her weakness. This may be related to her obvious depression. Her Paxil was increased to 40 mg today and Dr David Coyle was consulted.  She was also encouraged to work aggressively with PT while here to increase strength.                   Golden Sánchez NP   [address]  Office : [phone]  Fax : [phone]

## 2017-02-23 NOTE — PROGRESS NOTES
Pharmacokinetic Consult to Pharmacist    Chrystal Albarran is a 64 y.o. female being treated for Fever of unknown origin with Vancomycin and Zosyn. The patient has known metastatic colon cancer and is s/p cycle 8 of FOLFOX/Avastin.         Height: 5' 5\" (165.1 cm)  Weight: 79 kg (174 lb 3.2 oz)  Lab Results   Component Value Date/Time    BUN 9 02/23/2017 04:15 AM    Creatinine 0.84 02/23/2017 04:15 AM    WBC 6.3 02/23/2017 04:15 AM      Estimated Creatinine Clearance: 77.7 mL/min (based on Cr of 0.84). Lab Results   Component Value Date/Time    Vancomycin,trough 19.0 02/22/2017 11:40 PM       Day 3 of vancomycin. Goal trough is 15-20. Trough = 19.0. Dose before level hung ~3 hours late, extrapolated trough ~15. No changes, continue 1.25g q12h. Will continue to follow patient. Thank you,  Jamia Garvin, Pharm. D.   Clinical Pharmacist  [phone]

## 2017-02-23 NOTE — PROGRESS NOTES
END OF SHIFT NOTE:    Intake/Output  02/23 0701 - 02/23 1900  In: 1867 [P.O.:240; I.V.:1550]  Out: -    Voiding: YES  Catheter: NO  Drain:              Stool:  0 occurrences. Emesis:  0 occurrences. VITAL SIGNS  Patient Vitals for the past 12 hrs:   Temp Pulse Resp BP SpO2   02/23/17 1638 98.1 °F (36.7 °C) 96 18 145/82 91 %   02/23/17 0720 99.4 °F (37.4 °C) (!) 114 18 127/84 91 %       Pain Assessment  Pain 1  Pain Scale 1: Numeric (0 - 10) (02/23/17 1707)  Pain Intensity 1: 3 (02/23/17 1707)  Patient Stated Pain Goal: 0 (02/21/17 1100)  Pain Reassessment 1: Yes (02/23/17 1707)  Pain Onset 1: pta (02/23/17 0611)  Pain Location 1: Back (02/23/17 1639)  Pain Orientation 1: Lower (02/23/17 2164)  Pain Description 1: Aching (02/23/17 1639)  Pain Intervention(s) 1: Medication (see MAR) (02/23/17 1639)    Ambulating  Yes    Additional Information: report given to Ani Mclean RN    Shift report given to oncoming nurse at the bedside.     Dannielle Monk RN
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
## 2017-02-23 NOTE — PROGRESS NOTES
END OF SHIFT NOTE:    Intake/Output  02/22 1901 - 02/23 0700  In: 2580 [P.O.:240; I.V.:1066]  Out: -    Voiding: YES  Catheter: NO  Drain:              Stool:  0 occurrences. Emesis:  0 occurrences. VITAL SIGNS  Patient Vitals for the past 12 hrs:   Temp Pulse Resp BP SpO2   02/23/17 0326 99 °F (37.2 °C) 98 18 129/76 91 %   02/22/17 2348 99.1 °F (37.3 °C) (!) 120 18 153/76 90 %   02/22/17 1946 98.4 °F (36.9 °C) (!) 127 18 156/85 95 %       Pain Assessment  Pain 1  Pain Scale 1: Numeric (0 - 10) (02/23/17 0611)  Pain Intensity 1: 8 (02/23/17 0611)  Patient Stated Pain Goal: 0 (02/21/17 1100)  Pain Reassessment 1: Yes (02/22/17 2103)  Pain Onset 1: pta (02/23/17 0611)  Pain Location 1: Back (02/23/17 1514)  Pain Orientation 1: Lower (02/23/17 0614)  Pain Description 1: Aching (02/23/17 0827)  Pain Intervention(s) 1: Medication (see MAR) (02/23/17 9463)    Ambulating  Yes    Additional Information:     Shift report given to oncoming nurse at the bedside.     Carmella Pierre

## 2017-02-24 NOTE — PROGRESS NOTES
END OF SHIFT NOTE:    Intake/Output      Voiding: YES  Catheter: NO  Drain:              Stool:  0 occurrences. Emesis:  0 occurrences. VITAL SIGNS  Patient Vitals for the past 12 hrs:   Temp Pulse Resp BP SpO2   02/24/17 0345 97.8 °F (36.6 °C) 95 18 171/85 93 %   02/23/17 2328 97.5 °F (36.4 °C) 85 18 (!) 155/91 96 %   02/23/17 1859 98 °F (36.7 °C) - 18 118/74 94 %       Pain Assessment  Pain 1  Pain Scale 1: Visual (02/24/17 0510)  Pain Intensity 1: 0 (pt sleeping ) (02/24/17 0510)  Patient Stated Pain Goal: 0 (02/21/17 1100)  Pain Reassessment 1: Yes (02/24/17 0510)  Pain Onset 1: pta (02/24/17 0423)  Pain Location 1: Back (02/24/17 0423)  Pain Orientation 1: Lower (02/24/17 0423)  Pain Description 1: Aching (02/24/17 0423)  Pain Intervention(s) 1: Medication (see MAR) (02/24/17 0423)    Ambulating  Yes    Additional Information:     Shift report given to oncoming nurse at the bedside.     Nargis Coles

## 2017-02-24 NOTE — PROGRESS NOTES
[address]  Face to Face Encounter    Patients Name: Rupesh Baltazar    YOB: 1960    Ordering Physician: Dr Leanna Collins    Primary Diagnosis: Sick  Fever and chills  Pain    Date of Face to Face:   2/24/2017                                  Face to Face Encounter findings are related to primary reason for home care:   yes. 1. I certify that the patient needs intermittent care as follows: skilled nursing care:  skilled observation/assessment, patient education and administration of medications  physical therapy: strengthening and gait/stair training  occupational therapy:  ADL safety (ie. cooking, bathing, dressing) and pt/caregiver education    2. I certify that this patient is homebound, that is: 1) patient requires the use of a walker device, special transportation, or assistance of another to leave the home; or 2) patient's condition makes leaving the home medically contraindicated; and 3) patient has a normal inability to leave the home and leaving the home requires considerable and taxing effort. Patient may leave the home for infrequent and short duration for medical reasons, and occasional absences for non-medical reasons. Homebound status is due to the following functional limitations: Patient with strength deficits limiting the performance of all ADL's without caregiver assistance or the use of an assistive device. Patient with poor safety awareness and is at risk for falls without assistance of another person and the use of an assistive device. Patient with poor ambulation endurance limiting their safe ability to ascend/descend the required number of steps to leave the home. 3. I certify that this patient is under my care and that I, or a nurse practitioner or [facility], or clinical nurse specialist, or certified nurse midwife, working with me, had a Face-to-Face Encounter that meets the physician Face-to-Face Encounter requirements.   The following are the clinical findings from the [facility] encounter that support the need for skilled services and is a summary of the encounter: see hospital notes    See attached progess note      Ane Shells  2/24/2017      THE FOLLOWING TO BE COMPLETED BY THE COMMUNITY PHYSICIAN:    I concur with the findings described above from the F2F encounter that this patient is homebound and in need of a skilled service.     Certifying Physician: _____________________________________      Printed Certifying Physician Name: _____________________________________    Date: _________________

## 2017-02-24 NOTE — ADT AUTH CERT NOTES
Care Day: 2 Care Date: 2/22/2017 Level of Care: Inpatient Floor        Guideline Day 2        Level Of Care       (X) Floor              Clinical Status       (X) * No ICU or intermediate care needs              Interventions       (X) Inpatient interventions continue                                   * Milestone              Additional Notes       CONT STAY REVIEW FOR 2/22/17       Inpatient Hematology / Oncology Progress Note        Reason for Admission/Hospital Course:        Fever and chills       Pain        24 Hour Events:       Feeling better than yesterday       Afebrile, VSS       Back pain continues       MRI brain-clear       MRI LS- shows ? metastatic disease at L5 and left ilium       PLAN:       Metastatic Colon Cancer       -s/p C8 FOLFOX/Avastin                Fever (non neutropenic) s/p PO levaquin x 3 days       -IVF, Vanc/Zosyn, monitor fever curve       -2/22 Afebrile, continue antibiotics         Headaches/Lower back pain (Bone Mets)       -MRI brain and LS, continue home pain med doses, Morphine IV prn        -2/22 low back pain continues, 7/10 pain, consult radiation oncology         Supportive Care       -Follow Jenelle SOPs       -consult PT        Disposition: Mrs Destiny Cole continues to have lower back pain and now has MRI evidence of disease at L5 and left ilium. She was sent over to [facility] today for consultation for palliative treatment. She admits to not eating and drinking well. The importance of good nutrition and hydration was discussed in relation to her weakness. She was also encouraged to work aggressively with PT while here to increase strength.  She needs to get up out of the bed.        WBC: 4.4       RBC: 3.27 (L)       HGB: 7.5 (L)       HCT: 26.1 (L)        CEA: 41.0 (H)       HR 90s-120s, PAIN 8/10, REG DIET, SCDS, I&O, WT QD, PAXIL,NORCO, MS IV, ZOSYN IV Q 8, K DUR , DELTASONE PO, VANCO IV Q 12,

## 2017-02-24 NOTE — DISCHARGE INSTRUCTIONS
DISCHARGE SUMMARY from Nurse    The following personal items are in your possession at time of discharge:    Dental Appliances: None  Visual Aid: None     Home Medications: None  Jewelry: Necklace  Clothing: Footwear, Pants, Shirt, Socks, Undergarments, With patient  Other Valuables: Purse             PATIENT INSTRUCTIONS:    After general anesthesia or intravenous sedation, for 24 hours or while taking prescription Narcotics:  · Limit your activities  · Do not drive and operate hazardous machinery  · Do not make important personal or business decisions  · Do  not drink alcoholic beverages  · If you have not urinated within 8 hours after discharge, please contact your surgeon on call. Report the following to your surgeon:  · Excessive pain, swelling, redness or odor of or around the surgical area  · Temperature over 100.5  · Nausea and vomiting lasting longer than 4 hours or if unable to take medications  · Any signs of decreased circulation or nerve impairment to extremity: change in color, persistent  numbness, tingling, coldness or increase pain  · Any questions        What to do at Home:  Recommended activity: Activity as tolerated. If you experience any of the following symptoms nausea, vomiting, diarrhea, uncontrolled pain, fever of 100.5 or greater, shortness of breath, anything abnormal, please follow up with Dr. Corey Canela. *  Please give a list of your current medications to your Primary Care Provider. *  Please update this list whenever your medications are discontinued, doses are      changed, or new medications (including over-the-counter products) are added. *  Please carry medication information at all times in case of emergency situations. These are general instructions for a healthy lifestyle:    No smoking/ No tobacco products/ Avoid exposure to second hand smoke    Surgeon General's Warning:  Quitting smoking now greatly reduces serious risk to your health.     Obesity, smoking, and sedentary lifestyle greatly increases your risk for illness    A healthy diet, regular physical exercise & weight monitoring are important for maintaining a healthy lifestyle    You may be retaining fluid if you have a history of heart failure or if you experience any of the following symptoms:  Weight gain of 3 pounds or more overnight or 5 pounds in a week, increased swelling in our hands or feet or shortness of breath while lying flat in bed. Please call your doctor as soon as you notice any of these symptoms; do not wait until your next office visit. Recognize signs and symptoms of STROKE:    F-face looks uneven    A-arms unable to move or move unevenly    S-speech slurred or non-existent    T-time-call 911 as soon as signs and symptoms begin-DO NOT go       Back to bed or wait to see if you get better-TIME IS BRAIN. Warning Signs of HEART ATTACK     Call 911 if you have these symptoms:   Chest discomfort. Most heart attacks involve discomfort in the center of the chest that lasts more than a few minutes, or that goes away and comes back. It can feel like uncomfortable pressure, squeezing, fullness, or pain.  Discomfort in other areas of the upper body. Symptoms can include pain or discomfort in one or both arms, the back, neck, jaw, or stomach.  Shortness of breath with or without chest discomfort.  Other signs may include breaking out in a cold sweat, nausea, or lightheadedness. Don't wait more than five minutes to call 911 - MINUTES MATTER! Fast action can save your life. Calling 911 is almost always the fastest way to get lifesaving treatment. Emergency Medical Services staff can begin treatment when they arrive -- up to an hour sooner than if someone gets to the hospital by car. The discharge information has been reviewed with the patient. The patient verbalized understanding.     Discharge medications reviewed with the patient and appropriate educational materials and side effects teaching were provided.

## 2017-02-24 NOTE — PROGRESS NOTES
Care Management Interventions  PCP Verified by CM: Yes  Palliative Care Consult (Criteria: CHF and RRAT>21): Yes  Mode of Transport at Discharge:  Other (see comment)  Transition of Care Consult (CM Consult): [address]: Yes  MyChart Signup: No  Discharge Durable Medical Equipment: No  Health Maintenance Reviewed: Yes  Physical Therapy Consult: Yes  Occupational Therapy Consult: Yes  Speech Therapy Consult: No  Current Support Network: Lives with Spouse, Other  Confirm Follow Up Transport: Family  Plan discussed with Pt/Family/Caregiver: Yes  Freedom of Choice Offered: Yes  Discharge Location  Discharge Placement: Home with home health     Patient dc'd home today with a referral to [facility] home health and a 2WRW ordered from [facility]

## 2017-02-24 NOTE — DISCHARGE SUMMARY
[facility]: Inpatient Hematology / Oncology Discharge Summary Note    Patient ID:  Darcie Owusu  [id number]  64 y.o.  1960    Admit Date: 2/21/2017    Discharge Date: 2/24/2017    Admission Diagnoses: Sick  Fever and chills  Pain    Discharge Diagnoses:  Principal Diagnosis: Fever and chills  Principal Problem:    Fever and chills (2/21/2017)    Active Problems:    Sick (2/21/2017)      Pain (2/21/2017)      Difficulty coping with disease (2/23/2017)        Hospital Course:  Ms. Tosin Torres is a 64 y.o. female admitted on 2/21/2017 with a primary diagnosis of persistent fever, headache and lower back pain. She is a known patient of Dr Anastacia Coppola for metastatic colon cancer s/p cycle 8 FOLFOX/Avastin. She was delayed a week for cycle 8 due to neutropenia. At her most recent OV with Dr Duncan Loyd on 2/11/17 she stated having daily headaches despite stopping her zofran. He wanted to obtain a MRI of her brain at that time, but it was not completed last week. On 2/17/17 she began having weakness and a fever of 100.7, she was brought in to the infusion center for labs and fluids. She was not neutropenic at that time and was given RX for levaquin. She presented to the ED on 2/21/17 with complaints of continued fevers despite Levaquin and increased weakness, especially in her legs. She states she was in the shower the morning of 2/21/17 when she collapsed due to her leg weakness. She also stated having new intermittent lower back pain along with the continued headaches. She denied syncope or injury after her fall. During her admission she received both Vancomycin and Zosyn and fevers resolved. PT worked with her and she will resume PT at home to improve strength. MRI of her LS was completed and showed metastatic disease to L5 and Left Ilium. [facility] and [facility] and completed 2/10 fractions and will continue as outpatient.   Ironically, her CEA is the lowest since her diagnosis at 41.  Her nutrition and emotional status were of concern. She was obviously depressed and having difficulty coping with her current situation. Her Paxil was increased to 40 mg daily and she was referred to see Dr Camilo Horner and agreed to attend group sessions as OP. On the day of discharge she stated she was eating much better. This is most likely due to the addition of prednisone that she will remain on until seen for follow up. She will continue with XRT and then follow up with Dr Maksim Rose to discuss continuation of chemotherapy in 1-2 weeks. She will take Ceftin for 1 week. Consults:  IP CONSULT TO RADIATION ONCOLOGY  IP CONSULT TO PSYCHOLOGY    Pertinent Diagnostic Studies:   Labs:    Recent Labs      02/24/17   0415  02/23/17   0415  02/22/17   0350   WBC  4.9  6.3  4.4   HGB  7.7*  7.6*  7.5*   PLT  218  248  238   ANEU  3.6  4.4  2.8    Recent Labs      02/24/17   0415  02/23/17   0415  02/22/17   0350   NA  143  142  144   K  3.7  3.8  4.1   CL  108*  105  107   CO2  30  28  28   GLU  94  90  80   BUN  9  9  13   CREA  0.61  0.84  0.85   CA  9.6  9.2  9.4   SGOT  44*  48*  24   AP  138*  120  100   TP  6.8  6.8  6.7   ALB  2.4*  2.4*  2.4*   MG   --    --   2.3       Imaging:  Study Result      MRI LUMBAR SPINE WITHOUT AND WITH CONTRAST 2/21/2017     HISTORY: Fever headache and low back pain. Metastatic colon cancer. Leg  weakness.     TECHNIQUE: Sagittal T2-weighted, T1-weighted, STIR and postcontrast fat sat  T1-weighted images were obtained from T11 through S3. Axial T2-weighted,  T1-weighted and postcontrast fat sat T1-weighted images were obtained from  T12-L1 through L5-S1. 15 mL MultiHance gadolinium was used.     COMPARISON: CT chest abdomen and pelvis 1/5/2017     FINDINGS: There is abnormal marrow signal within the L5 vertebral body. Abnormal  marrow signal is also present within the left ilium. Findings suggest osseous  metastatic disease.      The lumbar vertebrae are normal in height.  The conus terminates at L1-L2 disc  level. There is no abnormal leptomeningeal enhancement.     Axial images: Extensive retroperitoneal adenopathy is present compatible with  scarlett metastatic disease.     T12-L1: No significant spinal stenosis or foraminal stenosis.     L1-L2: No significant spinal stenosis or foraminal stenosis.     L2-L3: No significant spinal stenosis or foraminal stenosis.     L3-L4: No significant spinal stenosis or foraminal stenosis.     L4-L5: Facet degenerative change. No significant spinal stenosis. Mild right  foraminal stenosis.     L5-S1: A small amount of soft tissue is present in the ventral epidural space. This is likely epidural extension of tumor. No significant spinal stenosis or  foraminal stenosis.       IMPRESSION  IMPRESSION:     1. Osseous metastatic disease at L5 with small amount of tumor in the ventral  epidural fat.     2. Additional osseous metastatic disease in the left ilium.     3. Facet degenerative change with mild right foraminal stenosis at L5-S1.     4. Retroperitoneal adenopathy. Discharge Medication List as of 2/24/2017 11:12 AM      START taking these medications    Details   predniSONE (DELTASONE) 10 mg tablet Take 1 Tab by mouth daily. , Normal, Disp-30 Tab, R-0         CONTINUE these medications which have CHANGED    Details   HYDROcodone-acetaminophen (NORCO) 5-325 mg per tablet Take 2 Tabs by mouth every six (6) hours as needed. Max Daily Amount: 8 Tabs., Print, Disp-240 Tab, R-0      PARoxetine (PAXIL) 40 mg tablet Take 1 Tab by mouth daily. Indications: GENERALIZED ANXIETY DISORDER, PANIC DISORDER, Normal, Disp-30 Tab, R-2         CONTINUE these medications which have NOT CHANGED    Details   cyclobenzaprine (FLEXERIL) 5 mg tablet Take 10 mg by mouth three (3) times daily as needed for Muscle Spasm(s). , Historical Med      LORazepam (ATIVAN) 1 mg tablet Take 1 Tab by mouth every four (4) hours as needed for Anxiety.  Max Daily Amount: 6 mg., Phone In, Disp-120 Tab, R-1      potassium chloride SR (MICRO-K) 10 mEq capsule Take 2 Caps by mouth two (2) times a day., Normal, Disp-120 Cap, R-1      magic mouthwash (JUSTO) susp Take 10 mL by mouth every four (4) hours. , Phone In, Disp-500 mL, R-2      ondansetron hcl (ZOFRAN) 8 mg tablet Take 1 Tab by mouth every eight (8) hours as needed for Nausea., Normal, Disp-90 Tab, R-3      lidocaine-prilocaine (EMLA) topical cream Apply  to affected area as needed. Apply 1/2 inch cream to port site 90 minutes prior to access, Normal, Disp-30 g, R-0      prochlorperazine (COMPAZINE) 10 mg tablet Take 1 Tab by mouth every six (6) hours as needed., Normal, Disp-120 Tab, R-3      ALPRAZolam (XANAX) 0.25 mg tablet Take 1 Tab by mouth nightly as needed for Anxiety. Max Daily Amount: 0.25 mg., Print, Disp-20 Tab, R-0      ondansetron (ZOFRAN ODT) 4 mg disintegrating tablet Take 1 Tab by mouth every eight (8) hours as needed for Nausea. , Print, Disp-8 Tab, R-2         STOP taking these medications       levoFLOXacin (LEVAQUIN) 500 mg tablet Comments:   Reason for Stopping:         levoFLOXacin (LEVAQUIN) 500 mg tablet Comments:   Reason for Stopping:         potassium chloride (K-DUR, KLOR-CON) 20 mEq tablet Comments:   Reason for Stopping:         mirtazapine (REMERON) 15 mg tablet Comments:   Reason for Stopping:                 OBJECTIVE:  Patient Vitals for the past 8 hrs:   BP Temp Pulse Resp SpO2   17 0820 (!) 150/94 97.8 °F (36.6 °C) (!) 114 16 95 %     Temp (24hrs), Av.8 °F (36.6 °C), Min:97.5 °F (36.4 °C), Max:98.1 °F (36.7 °C)         Physical Exam:  Constitutional: Well developed, well nourished female in no acute distress, sitting comfortably in the bedside chair. HEENT: Normocephalic and atraumatic. Oropharynx is clear, mucous membranes are moist.  Sclerae anicteric. Neck supple without JVD. No thyromegaly present. Skin Warm and dry. No bruising and no rash noted. No pallor.  Abrasion with erythema to left mid back, no bleeding. Respiratory Lungs are clear to auscultation bilaterally without wheezes, rales or rhonchi, normal air exchange without accessory muscle use. CVS Normal rate, regular rhythm and normal S1 and S2. No murmurs, gallops, or rubs. Abdomen Soft, nontender and nondistended, normoactive bowel sounds. No palpable mass. Neuro Grossly nonfocal with no obvious sensory or motor deficits. MSK Normal range of motion in general.  No edema and no tenderness. Psych Appropriate mood and affect. ASSESSMENT:    Principal Problem:    Fever and chills (2/21/2017)    Active Problems:    Sick (2/21/2017)      Pain (2/21/2017)      Difficulty coping with disease (2/23/2017)        DISPOSITION:  Follow-up Appointments   Procedures    FOLLOW UP VISIT Appointment in: Other (Specify) Please schedule F/u with Dr Ranjit Parikh with labs (CBC, CMP) in 2 weeks. Please schedule F/u with Dr Ranjit Parikh with labs (CBC, CMP) in 2 weeks. Standing Status:   Standing     Number of Occurrences:   1     Order Specific Question:   Appointment in     Answer: Other (Specify)         Over 30 minutes was spent in discharge planning and coordination of care.             Lydia Sadler NP  [facility]  [address] [address]  Office : [phone]  Fax : [phone]

## 2017-03-01 NOTE — PROGRESS NOTES
Patient: Anthony Hayes MRN: [id number]  SSN: [id number]    YOB: 1960  Age: 64 y.o. 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 SUBJECTIVE:  Anthony Hayes is a 64 y.o. female being treated for Metastatic colon cancer with painful bone metastases. She is doing well without complaints. Her pain was 9/10 prior to initiation of radiation therapy, now 3/10 without the need for any pain medication. OBJECTIVE:  No findings    There were no vitals taken for this visit. Lab Results   Component Value Date/Time    Sodium 143 02/24/2017 04:15 AM    Potassium 3.7 02/24/2017 04:15 AM    Chloride 108 02/24/2017 04:15 AM    CO2 30 02/24/2017 04:15 AM    Anion gap 5 02/24/2017 04:15 AM    Glucose 94 02/24/2017 04:15 AM    BUN 9 02/24/2017 04:15 AM    Creatinine 0.61 02/24/2017 04:15 AM    GFR est AA >60 02/24/2017 04:15 AM    GFR est non-AA >60 02/24/2017 04:15 AM    Calcium 9.6 02/24/2017 04:15 AM    Magnesium 2.3 02/22/2017 03:50 AM    Albumin 2.4 02/24/2017 04:15 AM    Protein, total 6.8 02/24/2017 04:15 AM    Globulin 4.4 02/24/2017 04:15 AM    A-G Ratio 0.5 02/24/2017 04:15 AM    AST (SGOT) 44 02/24/2017 04:15 AM    ALT (SGPT) 32 02/24/2017 04:15 AM     Lab Results   Component Value Date/Time    WBC 4.9 02/24/2017 04:15 AM    HGB 7.7 02/24/2017 04:15 AM    HCT 25.7 02/24/2017 04:15 AM    PLATELET 650 78/37/2454 04:15 AM       ASSESSMENT and PLAN:  Anthony Hayes is tolerating radiation as anticipated for the current dose and fraction. We will continue on as planned with another treatment visit anticipated next week.         Jessie Viveros MD   March 1, 2017

## 2017-03-07 NOTE — PROGRESS NOTES
Patient: Kelsey Askew MRN: [id number]  SSN: [id number]    YOB: 1960  Age: 64 y.o. Sex: female      DIAGNOSIS:  Metastatic colon cancer with painful bone metastases    SITE TREATED AND DOSE DELIVERED:  L4 - S1 and involved left ilium have received 27 Gy of 30 Gy in 9/10 fractions. SUBJECTIVE:  Kelsey Askew is a 64 y.o. female being treated for Metastatic colon cancer with painful bone metastases. She is doing well without complaints. Her pain was 9/10 prior to initiation of radiation therapy, now 3/10 without the need for any pain medication. OBJECTIVE:  No findings    There were no vitals taken for this visit. Lab Results   Component Value Date/Time    Sodium 143 02/24/2017 04:15 AM    Potassium 3.7 02/24/2017 04:15 AM    Chloride 108 02/24/2017 04:15 AM    CO2 30 02/24/2017 04:15 AM    Anion gap 5 02/24/2017 04:15 AM    Glucose 94 02/24/2017 04:15 AM    BUN 9 02/24/2017 04:15 AM    Creatinine 0.61 02/24/2017 04:15 AM    GFR est AA >60 02/24/2017 04:15 AM    GFR est non-AA >60 02/24/2017 04:15 AM    Calcium 9.6 02/24/2017 04:15 AM    Magnesium 2.3 02/22/2017 03:50 AM    Albumin 2.4 02/24/2017 04:15 AM    Protein, total 6.8 02/24/2017 04:15 AM    Globulin 4.4 02/24/2017 04:15 AM    A-G Ratio 0.5 02/24/2017 04:15 AM    AST (SGOT) 44 02/24/2017 04:15 AM    ALT (SGPT) 32 02/24/2017 04:15 AM     Lab Results   Component Value Date/Time    WBC 4.9 02/24/2017 04:15 AM    HGB 7.7 02/24/2017 04:15 AM    HCT 25.7 02/24/2017 04:15 AM    PLATELET 735 96/64/5545 04:15 AM       ASSESSMENT and PLAN:  Kelsey Askew is tolerating radiation as anticipated for the current dose and fraction. She will complete treatment tomorrow and then return for followup in 1 month.       Haley Delgado MD   March 7, 2017

## 2017-03-15 NOTE — PROGRESS NOTES
Patient tolerated chemotherapy well. No reactions. Fluorouracil pump connected by RN. Initial education and pump instructions given by [facility] RN. Pump function reviewed with patient prior to discharge. Patient/family instructed to notify [facility] of any problems, questions or concerns with pump. Patient/family reminded to use  chemotherapy precautions at home. Allowed opportunity for patient/family to ask questions. Patient discharged via ambulatory accompanied by spouse. Instructed to notify physician of any problems, questions or concerns after discharge. Next appointment is 03/15/17 at 1500 with Alyson Rush.

## 2017-03-17 NOTE — PROGRESS NOTES
Arrived to the [facility]. Assessment completed. Patient's fluorouracil pump was disconnected from her port today, without difficulty. The port was then flushed per protocol. Any issues or concerns during appointment: none. Patient aware of next infusion appointment on 3/29/17  (date) at 56 (time) with IV infusion center. Discharged ambulatory, with spouse. Patient was instructed to call Dr. Jose Enrique Sullivan office immediately for any problems or concerns. She verbalizes understanding.

## 2017-03-29 NOTE — PROGRESS NOTES
Arrived to the [facility]. Chemotherapy completed. Patient tolerated well. Fluorouracil pump infusing on discharge  Any issues or concerns during appointment: none. Patient aware of next infusion appointment on 03/31  at 1400 . Discharged ambulatory.

## 2017-03-31 NOTE — PROGRESS NOTES
Arrived to the [facility]. Pump DC completed. Patient tolerated well. Any issues or concerns during appointment: Patient noted to be orthostatic. Fluids offered, patient refused. She states she drinks 2 L of water a day at home and will continue to push fluids. Patient aware of next appointment on 4/13/17 at 0915 in the office. Discharged ambulatory with family member.

## 2017-04-04 NOTE — PROGRESS NOTES
Arrived to the [facility].  Labs drawn and reviewed-1 liter of NS given IV  Any issues or concerns during appointment:K+=2.8-20 mEqKCl given IV over 2 hours as ordered  Patient has no future appointments in OPI @ this time  Discharged ambulatory with

## 2017-04-19 NOTE — PROGRESS NOTES
Orders clarified with Dr. Philip Mckeon. Pt was scheduled for Folfiri. Orders for Folfox in treatment plan. There is no change in treatment. Will proceed with Folfox. Pt verbalized understanding.

## 2017-04-19 NOTE — PROGRESS NOTES
Patient tolerated chemotherapy well. No reactions. Fluorouracil pump connected by RN. Initial education and pump instructions given by [facility] RN. Pump function reviewed with patient prior to discharge. Patient/family instructed to notify [facility] of any problems, questions or concerns with pump. Patient/family reminded to use  chemotherapy precautions at home. Allowed opportunity for patient/family to ask questions. Patient discharged via ambulatory accompanied by family. Instructed to notify physician of any problems, questions or concerns after discharge. Next appointment is 04/21/17 at 1500 with Alyson Rush.

## 2017-04-21 NOTE — PROGRESS NOTES
Arrived to the [facility]. Fluorouracil pump completed. Patient tolerated well. Any issues or concerns during appointment: Tachycardia on admission. Vital signs stable after rest. States she becomes anxious when coming in. Patient aware of next infusion appointment on 05/02  at 1350 . Discharged ambulatory.
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
## 2017-05-03 NOTE — PROGRESS NOTES
Pt arrived ambulatory today at 0730, to receive IV chemotherapy. Pt tolerated without difficulty. Patient discharged via ambulatory accompanied by daughter. Instructed to notify physician of any problems, questions or concerns. Allowed opportunity for patient/family to ask questions. Verbalized understanding. Next appointment is May 5 at  with Alyson 0726.

## 2017-05-03 NOTE — PROGRESS NOTES
Problem: Chemotherapy Treatment  Goal: *Chemotherapy regimen followed  Outcome: Progressing Towards Goal  Pt verbalizes/demonstrates understanding of purpose/procedure/potential side effects of irinotecan/leucovorin/avastin/fluorouracil.

## 2017-05-05 NOTE — PROGRESS NOTES
Arrived for continuous chemotherapy pump removal.  8.9 ml left in pump. Pt. States that pump went \"off\" and \"on\" all night. Spoke with Dr. Philip Mckeon and he states to proceed with removing pump. Pump removed per protocol.

## 2017-05-17 NOTE — PROGRESS NOTES
[facility] Assessment completed. Patient was seen in [facility] [address] Yohan Preston yesterday. Labs reviewed. Patient tolerated chemotherapy well today. Any issues or concerns during appointment: complains of constipation today. She states that she talked with Dr. Philip Mckeon about it yesterday at the office visit. Checked with Julianna Valentine RN, re:  Checking to see if it is okay that patient takes magnesium citrate if she does not get relief from the Miralax. She states that this would be fine. Instructed patient of this and she verbalizes understanding. Patient aware of next infusion appointment on 5/19/17 (date) at 1400 (time) with IV infusion center. Discharged ambulatory, with spouse. Patient instructed to call Dr. Angel Mask office immediately for any problems or concerns. She verbalizes understanding.

## 2017-05-19 NOTE — PROGRESS NOTES
Arrived to the [facility]. Assessment completed. Patient's fluorouracil pump was disconnected from her port today, and then the port was flushed per protocol. Any issues or concerns during appointment: noted slight tachycardia today, but patient denies wanting any IV fluids. She states that she is \"drinking a lot of water\". Blood pressure stable. Encouraged her to continue drinking plenty of po fluids. She verbalizes understanding. Patient aware of next infusion appointment on 5/31/17 (date) at 200 (time) with IV infusion center. Discharged ambulatory, with spouse. Patient instructed to call Dr. Matthew Newberry office immediately for any problems or concerns. She verbalizes understanding.

## 2017-06-28 NOTE — PROGRESS NOTES
Arrived to the [facility]. Folfiri/Avastin completed. Patient tolerated well. Any issues or concerns during appointment: none. Patient aware of next infusion appointment on 6/30/17 at 1400. Discharged in [facility].

## 2017-06-30 NOTE — PROGRESS NOTES
Arrived to the [facility]. 5 FU completed. Patient tolerated well  Any issues or concerns during appointment: No  No further appointments in OPI @ this time  Discharged home ambulatory

## 2017-07-15 PROBLEM — N39.0 UTI (URINARY TRACT INFECTION): Status: ACTIVE | Noted: 2017-01-01

## 2017-07-15 NOTE — ED TRIAGE NOTES
Patient arrives to the ER via POV. Patient states she is recieivng stage four colon cancer. Patient was diagnosed with a UTI on Thursday. Patient is taking bactrim. Patient is on chemotherapy.  Patient states n/v.

## 2017-07-15 NOTE — IP AVS SNAPSHOT
[address] 
[phone] Patient: Earl Napoles MRN: [id number] :1960 You are allergic to the following No active allergies Recent Documentation Height Weight Breastfeeding? BMI OB Status Smoking Status 1.651 m 67.4 kg No 24.73 kg/m2 Menopause Former Smoker Unresulted Labs Order Current Status CULTURE, BLOOD Preliminary result CULTURE, BLOOD Preliminary result TYPE & CROSSMATCH Preliminary result Emergency Contacts Name Discharge Info Relation Home Work Mobile Jyoti Howard DISCHARGE CAREGIVER [3] Daughter [21] [id number] About your hospitalization You were admitted on:  July 15, 2017 You last received care in the:  [facility] 6 MED SURG You were discharged on:  2017 Unit phone number:  [phone] Why you were hospitalized Your primary diagnosis was:  Uti (Urinary Tract Infection) Your diagnoses also included: Tachycardia, Malignant Neoplasm Of Colon (Hcc) Providers Seen During Your Hospitalizations Provider Role Specialty Primary office phone Marylou Myers MD Attending Provider Emergency Medicine [phone] Mayda Miles MD Attending Provider Internal Medicine [phone] Your Primary Care Physician (PCP) Primary Care Physician Office Phone Office Fax Romeo Guo [phone] Follow-up Information Follow up With Details Comments Contact Info Jenny Headings, DO  message left with office for follow up appointment in 1 week. office will call you. [address]  
[address] [facility] [address] 
[phone] Ana Lilia Whitney MD  message left with  to contact you with appointment date or time. [address] [facility] [address] 
[phone] Current Discharge Medication List  
  
START taking these medications Dose & Instructions Dispensing Information Comments Morning Noon Evening Bedtime  
 ciprofloxacin HCl 500 mg tablet Commonly known as:  CIPRO Your next dose is: This evening Dose:  500 mg Take 1 Tab by mouth two (2) times a day for 4 days. Quantity:  8 Tab Refills:  0  
     
  
   
   
  
   
  
 dronabinol 5 mg capsule Commonly known as:  Cameron Hodgson Your next dose is: This evening Dose:  5 mg Take 1 Cap by mouth Before breakfast and dinner. Max Daily Amount: 10 mg. Indications: CANCER CHEMOTHERAPY-INDUCED NAUSEA AND VOMITING Quantity:  30 Cap Refills:  0 CONTINUE these medications which have NOT CHANGED Dose & Instructions Dispensing Information Comments Morning Noon Evening Bedtime ALPRAZolam 0.25 mg tablet Commonly known as:  Rosalensebas Gar Your next dose is: Take on as needed schedule Dose:  0.25 mg Take 1 Tab by mouth nightly as needed for Anxiety. Max Daily Amount: 0.25 mg.  
 Quantity:  20 Tab Refills:  0  
     
   
   
   
  
 cyclobenzaprine 10 mg tablet Commonly known as:  FLEXERIL Your next dose is: With meals Dose:  10 mg Take 1 Tab by mouth three (3) times daily (with meals). Quantity:  30 Tab Refills:  0 HYDROcodone-acetaminophen 5-325 mg per tablet Commonly known as:  [place] Your next dose is: Take on as needed schedule Dose:  1 Tab Take 1 Tab by mouth every six (6) hours as needed. Max Daily Amount: 4 Tabs. Quantity:  120 Tab Refills:  0  
     
   
   
   
  
 lidocaine-prilocaine topical cream  
Commonly known as:  EMLA Apply  to affected area as needed. Apply 1/2 inch cream to port site 90 minutes prior to access Quantity:  30 g Refills:  0 LORazepam 1 mg tablet Commonly known as:  ATIVAN Dose:  1 mg Take 1 Tab by mouth every four (4) hours as needed for Anxiety. Max Daily Amount: 6 mg. Quantity:  120 Tab Refills:  0  
     
   
   
   
  
 ondansetron 4 mg disintegrating tablet Commonly known as:  ZOFRAN ODT Dose:  4 mg Take 1 Tab by mouth every eight (8) hours as needed for Nausea. Quantity:  8 Tab Refills:  2  
     
   
   
   
  
 ondansetron hcl 8 mg tablet Commonly known as:  Robyne Childs Dose:  8 mg Take 1 Tab by mouth every eight (8) hours as needed for Nausea. Quantity:  90 Tab Refills:  3 PARoxetine 40 mg tablet Commonly known as:  PAXIL Dose:  40 mg Take 1 Tab by mouth daily. Indications: GENERALIZED ANXIETY DISORDER, PANIC DISORDER Quantity:  30 Tab Refills:  2  
     
   
   
   
  
 potassium chloride SA 10 mEq capsule Commonly known as:  Delphia Prachi Dose:  20 mEq Take 2 Caps by mouth two (2) times a day. Quantity:  120 Cap Refills:  1  
     
   
   
   
  
 prochlorperazine 10 mg tablet Commonly known as:  COMPAZINE Dose:  10 mg Take 1 Tab by mouth every six (6) hours as needed. Quantity:  120 Tab Refills:  3 STOP taking these medications   
 naproxen sodium 550 mg tablet Commonly known as:  Ana HOLT Where to Get Your Medications Information on where to get these meds will be given to you by the nurse or doctor. ! Ask your nurse or doctor about these medications  
  ciprofloxacin HCl 500 mg tablet  
 dronabinol 5 mg capsule Discharge Instructions DISCHARGE SUMMARY from Nurse The following personal items are in your possession at time of discharge: 
 
Dental Appliances: None Visual Aid: None Home Medications: Sent home Jewelry: [email or web address] Clothing: Pajamas Other Valuables: Cell Phone PATIENT INSTRUCTIONS: 
 
 
F-face looks uneven A-arms unable to move or move unevenly S-speech slurred or non-existent T-time-call 911 as soon as signs and symptoms begin-DO NOT go Back to bed or wait to see if you get better-TIME IS BRAIN. Warning Signs of HEART ATTACK Call 911 if you have these symptoms: 
? Chest discomfort. Most heart attacks involve discomfort in the center of the chest that lasts more than a few minutes, or that goes away and comes back. It can feel like uncomfortable pressure, squeezing, fullness, or pain. ? Discomfort in other areas of the upper body. Symptoms can include pain or discomfort in one or both arms, the back, neck, jaw, or stomach. ? Shortness of breath with or without chest discomfort. ? Other signs may include breaking out in a cold sweat, nausea, or lightheadedness. Don't wait more than five minutes to call [address]! Fast action can save your life. Calling 911 is almost always the fastest way to get lifesaving treatment. Emergency Medical Services staff can begin treatment when they arrive  up to an hour sooner than if someone gets to the hospital by car. The discharge information has been reviewed with the patient. The patient verbalized understanding. Discharge medications reviewed with the patient and appropriate educational materials and side effects teaching were provided. Discharge Instructions Attachments/References UTI (URINARY TRACT INFECTION): FEMALE (ENGLISH) Discharge Orders None [employer] Announcement We are excited to announce that we are making your provider's discharge notes available to you in 1calendar.   You will see these notes when they are completed and signed by the physician that discharged you from your recent hospital stay. If you have any questions or concerns about any information you see in [employer], please call the Health Information Department where you were seen or reach out to your Primary Care Provider for more information about your plan of care. Introducing [facility]! Dear Tequila Farfan: 
Thank you for requesting a [employer] account. Our records indicate that you already have an active [employer] account. You can access your account anytime at [email or web address] Did you know that you can access your hospital and ER discharge instructions at any time in [employer]? You can also review all of your test results from your hospital stay or ER visit. Additional Information If you have questions, please visit the Frequently Asked Questions section of the [employer] website at [email or web address] Remember, [employer] is NOT to be used for urgent needs. For medical emergencies, dial 911. Now available from your iPhone and Android! General Information Please provide this summary of care documentation to your next provider. Patient Signature:  ____________________________________________________________ Date:  ____________________________________________________________  
  
Nasrin Oxford Provider Signature:  ____________________________________________________________ Date:  ____________________________________________________________ More Information Urinary Tract Infection in Women: Care Instructions Your Care Instructions A urinary tract infection, or UTI, is a general term for an infection anywhere between the kidneys and the urethra (where urine comes out). Most UTIs are bladder infections. They often cause pain or burning when you urinate. UTIs are caused by bacteria and can be cured with antibiotics.  Be sure to complete your treatment so that the infection goes away. Follow-up care is a key part of your treatment and safety. Be sure to make and go to all appointments, and call your doctor if you are having problems. It's also a good idea to know your test results and keep a list of the medicines you take. How can you care for yourself at home? · Take your antibiotics as directed. Do not stop taking them just because you feel better. You need to take the full course of antibiotics. · Drink extra water and other fluids for the next day or two. This may help wash out the bacteria that are causing the infection. (If you have kidney, heart, or liver disease and have to limit fluids, talk with your doctor before you increase your fluid intake.) · Avoid drinks that are carbonated or have caffeine. They can irritate the bladder. · Urinate often. Try to empty your bladder each time. · To relieve pain, take a hot bath or lay a heating pad set on low over your lower belly or genital area. Never go to sleep with a heating pad in place. To prevent UTIs · Drink plenty of water each day. This helps you urinate often, which clears bacteria from your system. (If you have kidney, heart, or liver disease and have to limit fluids, talk with your doctor before you increase your fluid intake.) · Urinate when you need to. · Urinate right after you have sex. · Change sanitary pads often. · Avoid douches, bubble baths, feminine hygiene sprays, and other feminine hygiene products that have deodorants. · After going to the bathroom, wipe from front to back. When should you call for help? Call your doctor now or seek immediate medical care if: · Symptoms such as fever, chills, nausea, or vomiting get worse or appear for the first time. · You have new pain in your back just below your rib cage. This is called flank pain. · There is new blood or pus in your urine. · You have any problems with your antibiotic medicine. Watch closely for changes in your health, and be sure to contact your doctor if: 
· You are not getting better after taking an antibiotic for 2 days. · Your symptoms go away but then come back. Where can you learn more? Go to http://jose-lynn.info/. Enter K268 in the search box to learn more about \"Urinary Tract Infection in Women: Care Instructions. \" Current as of: November 28, 2016 Content Version: 11.3 © 9111-1169 [employer]. Care instructions adapted under license by [employer] (which disclaims liability or warranty for this information). If you have questions about a medical condition or this instruction, always ask your healthcare professional. [address] any warranty or liability for your use of this information.
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
## 2017-07-15 NOTE — PROGRESS NOTES
TRANSFER - IN REPORT:    Verbal report received from [facility] on [place]  being received from ER for routine progression of care      Report consisted of patients Situation, Background, Assessment and   Recommendations(SBAR). Information from the following report(s) SBAR, ED Summary, [facility] and Recent Results was reviewed with the receiving nurse. Opportunity for questions and clarification was provided. Assessment completed upon patients arrival to unit and care assumed.

## 2017-07-15 NOTE — ED NOTES
TRANSFER - OUT REPORT:    Verbal report given to Gracie Squires RN on [facility]  being transferred to 6th floor for routine progression of care       Report consisted of patients Situation, Background, Assessment and   Recommendations(SBAR). Information from the following report(s) SBAR, Kardex, ED Summary, MAR and Accordion was reviewed with the receiving nurse. Lines:         Peripheral IV 07/15/17 Right Antecubital (Active)   Site Assessment Clean, dry, & intact 7/15/2017  7:21 PM   Phlebitis Assessment 0 7/15/2017  7:21 PM   Infiltration Assessment 0 7/15/2017  7:21 PM   Dressing Status Clean, dry, & intact 7/15/2017  7:21 PM        Opportunity for questions and clarification was provided.

## 2017-07-15 NOTE — PROGRESS NOTES
Pharmacokinetic Consult to Pharmacist    Aliza Sae is a 64 y.o. female being treated for sepsis with vancomycin and zosyn. Height: 5' 5\" (165.1 cm)  Weight: 68 kg (150 lb)  Lab Results   Component Value Date/Time    BUN 10 07/15/2017 02:50 PM    Creatinine 1.21 07/15/2017 02:50 PM    WBC 7.7 07/15/2017 02:50 PM    Lactic acid 1.5 11/16/2016 04:26 PM      Estimated Creatinine Clearance: 46.7 mL/min (based on Cr of 1.21). CULTURES:  7/15: BCx -in process    Day 1 of vancomycin. Goal trough is 15-20. Vancomycin dose initiated at 1.25g q24h. Check trough prior to 4th dose. Will continue to follow patient.       Thank you,  Dejuan Patel, PharmD, BCPS  Clinical Pharmacist  [phone]
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
## 2017-07-16 NOTE — PROGRESS NOTES
Hospitalist Progress Note    2017  Admit Date: 7/15/2017  2:54 PM   NAME: Yasmine Diamond   :  1960   MRN:  [id number]   Attending: Aide Candelario MD  PCP:  Harish Hawk DO    SUBJECTIVE:   Patient with a hx of stage IV colon cancer, anxiety disorder who presents with lower abdominal pain radiating to left side. Pt recently started on bactrim for UTI but has not improved. UA with concerns for UTI. CT abd with colovesical fistula. Review of Systems negative with exception of pertinent positives noted above  PHYSICAL EXAM     Visit Vitals    /78 (BP 1 Location: Left arm, BP Patient Position: At rest)    Pulse 98    Temp 97.7 °F (36.5 °C)    Resp 18    Ht 5' 5\" (1.651 m)    Wt 68 kg (150 lb)    SpO2 94%    Breastfeeding No    BMI 24.96 kg/m2      Temp (24hrs), Av.1 °F (36.7 °C), Min:97.7 °F (36.5 °C), Max:98.6 °F (37 °C)    Oxygen Therapy  O2 Sat (%): 94 % (17 1057)  Pulse via Oximetry: 95 beats per minute (07/15/17 1845)  O2 Device: Room air (07/15/17 1915)    Intake/Output Summary (Last 24 hours) at 17 1346  Last data filed at 07/15/17 2357   Gross per 24 hour   Intake            254.5 ml   Output                0 ml   Net            254.5 ml      General: No acute distress    Lungs:  CTA Bilaterally. Heart:  Regular rate and rhythm,  No murmur, rub, or gallop  Abdomen: Soft, Non distended, Non tender, Positive bowel sounds  Extremities: No cyanosis, clubbing or edema  Neurologic:  No focal deficits    ASSESSMENT      Active Hospital Problems    Diagnosis Date Noted    UTI (urinary tract infection) 07/15/2017    Tachycardia 11/15/2016    Malignant neoplasm of colon ([facility] [address] 2016     Plan:  · Continue zosyn. Monitor cx and adjust as needed  · VS remain stable  · No sx options for fistula  · Oncology consulted for colon cancer.       DVT Prophylaxis: heparin    Signed By: Aide Candelario MD     2017

## 2017-07-16 NOTE — PROGRESS NOTES
Dual skin assessment completed with Angela Light RN. Skin c,d,i, no skin breakdown noted. Tattoo to right wrist. Patient and family oriented to room, call light in place. Hourly rounds completed per protocol.

## 2017-07-17 NOTE — PROGRESS NOTES
Hospitalist Progress Note    2017  Admit Date: 7/15/2017  2:54 PM   NAME: Norbert Fleming   :  1960   MRN:  [id number]   Attending: Nicky Samson MD  PCP:  Redd Galvin DO    SUBJECTIVE:   Patient with a hx of stage IV colon cancer, anxiety disorder who presents with lower abdominal pain radiating to left side. Pt recently started on bactrim for UTI but has not improved. UA with concerns for UTI. CT abd with colovesical fistula.  - appears sleepy after pain med this afternoon. Family at bedside. Review of Systems negative with exception of pertinent positives noted above  PHYSICAL EXAM     Visit Vitals    /65 (BP 1 Location: Left arm, BP Patient Position: At rest)    Pulse 89    Temp 97.4 °F (36.3 °C)    Resp 18    Ht 5' 5\" (1.651 m)    Wt 67.4 kg (148 lb 9.6 oz)    SpO2 93%    Breastfeeding No    BMI 24.73 kg/m2      Temp (24hrs), Av.1 °F (36.7 °C), Min:97.1 °F (36.2 °C), Max:99.9 °F (37.7 °C)    Oxygen Therapy  O2 Sat (%): 93 % (17 1406)  Pulse via Oximetry: 95 beats per minute (07/15/17 1845)  O2 Device: Room air (17 1406)    Intake/Output Summary (Last 24 hours) at 17 1827  Last data filed at 17 1406   Gross per 24 hour   Intake             2636 ml   Output              200 ml   Net             2436 ml      General: No acute distress    Lungs:  CTA Bilaterally. Heart:  Regular rate and rhythm,  No murmur, rub, or gallop  Abdomen: Soft, Non distended, Non tender, Positive bowel sounds  Extremities: No cyanosis, clubbing or edema  Neurologic:  No focal deficits    ASSESSMENT      Active Hospital Problems    Diagnosis Date Noted    UTI (urinary tract infection) 07/15/2017    Tachycardia 11/15/2016    Malignant neoplasm of colon ([facility] [address] 2016     Plan:  · Continue zosyn.   Unfortunately urine cx not taken prior to atbx so will need to treat empirically when switching to oral  · Anemia - of chronic disease - transfused 2 units prbc 7/16, infed x 1  · VS remain stable  · No sx options for fistula  · Oncology following for colon cancer. · Debility - pt consulted.  Has support at home    DVT Prophylaxis: heparin    dispo - ?home in Am pending PT zully ag/onc recs    Signed By: Melyssa Sadler DO     July 17, 2017

## 2017-07-17 NOTE — PROGRESS NOTES
Observed/rounded every hour. Spouse appears concerned with quality of care. Pt was treated for pain through shift. Alert and oriented with mild confusion that her daughter reports is her baseline. Spouse brought in food from outside.

## 2017-07-17 NOTE — PROGRESS NOTES
END OF SHIFT NOTE:    INTAKE/OUTPUT     Voiding: YES  Catheter: NO  Color: clear        DIET  regular    Flatus: Patient does have flatus present. Stool:  0 occurrences. Characteristics:       Ambulating  No    Emesis: 0 occurrences.     Characteristics:          VITAL SIGNS  Patient Vitals for the past 12 hrs:   Temp Pulse Resp BP SpO2   07/17/17 0322 97.1 °F (36.2 °C) 96 18 131/87 95 %   07/16/17 2301 98.3 °F (36.8 °C) (!) 101 20 122/76 92 %       Pain Assessment  Pain Intensity 1: 0 (07/17/17 0009)  Pain Location 1: Back  Pain Intervention(s) 1: Medication (see MAR)  Patient Stated Pain Goal: 0            Rehana Bahena RN

## 2017-07-17 NOTE — PROGRESS NOTES
[facility] Hematology & Oncology        Inpatient Hematology / Oncology Progress Note      Admission Date: 7/15/2017  2:54 PM  Reason for Admission/Hospital Course: UTI (urinary tract infection)      24 Hour Events:  CT abd with colovesical fistula  On Zosyn for UTI  Hgb improved to 7.6  Family reports patient is not eating well  Family at bedside      ROS:  Constitutional: +fatigue +decreased appetite. Negative for fever, chills. CV: Negative for chest pain, palpitations, edema. Respiratory: Negative for dyspnea, cough, wheezing. GI: +abd pain. Negative for nausea, diarrhea. 10 point review of systems is otherwise negative with the exception of the elements mentioned above in the HPI. No Known Allergies    OBJECTIVE:  Patient Vitals for the past 8 hrs:   BP Temp Pulse Resp SpO2   17 1406 105/65 97.4 °F (36.3 °C) 89 18 93 %   17 1152 141/72 97.5 °F (36.4 °C) 81 18 92 %   17 0701 136/89 98.1 °F (36.7 °C) 99 18 91 %     Temp (24hrs), Av.2 °F (36.8 °C), Min:97.1 °F (36.2 °C), Max:99.9 °F (37.7 °C)     07 -  1900  In: 2636 [I.V.:2636]  Out: 200 [Urine:200]    Physical Exam:  Constitutional: Well developed, well nourished female in no acute distress, lying comfortably in the hospital bed. HEENT: Normocephalic and atraumatic. Oropharynx is clear, mucous membranes are moist. Extraocular muscles are intact. Sclerae anicteric. Neck supple without JVD. No thyromegaly present. Lymph node   Deferred   Skin Warm and dry. No bruising and no rash noted. No erythema. No pallor. Respiratory Lungs are clear to auscultation bilaterally without wheezes, rales or rhonchi, normal air exchange without accessory muscle use. CVS Normal rate, regular rhythm and normal S1 and S2. No murmurs, gallops, or rubs. Abdomen Soft, generalized tenderness with palpation and nondistended, normoactive bowel sounds. No palpable mass. No hepatosplenomegaly.    Neuro Grossly nonfocal with no obvious sensory or motor deficits. MSK Normal range of motion in general.  No edema and no tenderness. Psych Appropriate mood and affect. Labs:    Recent Labs      07/17/17   1343  07/16/17   0726  07/15/17   1450   WBC  5.8  5.7  7.7   RBC  3.33*  3.03*  3.05*   HGB  7.6*  6.9*  6.8*   HCT  25.6*  23.3*  22.6*   MCV  76.9*  76.9*  74.1*   MCH  22.8*  22.8*  22.3*   MCHC  29.7*  29.6*  30.1*   RDW  19.6*  19.5*  20.5*   PLT  304  261  337   GRANS   --    --   79*   LYMPH   --    --   10*   MONOS   --    --   9   EOS   --    --   1   BASOS   --    --   0   IG   --    --   1.0   DF   --    --   AUTOMATED   ANEU   --    --   6.0   ABL   --    --   0.8   ABM   --    --   0.7   SUN   --    --   0.1   ABB   --    --   0.0   AIG   --    --   0.1      Recent Labs      07/16/17   0726  07/15/17   1450   NA  140  135*   K  3.4*  3.2*   CL  107  97*   CO2  24  27   AGAP  9  11   GLU  81  118*   BUN  9  10   CREA  0.89  1.21*   GFRAA  >60  59*   GFRNA  >60  49*   CA  8.8  9.3   SGOT   --   24   AP   --   164*   TP   --   7.6   ALB   --   2.3*   GLOB   --   5.3*   AGRAT   --   0.4*         Imaging:  CT abd/pelvis 7/15     FINDINGS:      There is mild scarring at the left lung base.      CT abdomen: The liver and spleen enhances homogeneously without discrete  lesions. There is no biliary ductal dilatation. The gallbladder, pancreas and  adrenal glands are normal. The kidneys enhance symmetrically. Bowel loops in the  upper abdomen are normal. There is extensive retroperitoneal lymphadenopathy, as  was seen on the prior exam. The degree of adenopathy is stable when compared  with the prior study.      CT pelvis: There is persistent colonic bowel wall thickening at the junction of  the descending and sigmoid colon. There is pericolonic fat stranding as well as  present previously. There is air and debris seen within the left dome of the  bladder. Cannot exclude a colovesical fistula No pelvic adenopathy seen.  No free  air or free fluid seen within the pelvis.       Bone window evaluation demonstrates no aggressive osseous lesions.     IMPRESSION:      1. Findings suggestive of a colovesical fistula. There is persistent bowel wall  thickening and pericolonic fat stranding. 2. Persistent retroperitoneal lymphadenopathy. ASSESSMENT:    Problem List  Date Reviewed: 6/28/2017          Codes Class Noted    * (Principal)UTI (urinary tract infection) ICD-10-CM: N39.0  ICD-9-CM: 599.0  7/15/2017        Difficulty coping with disease ICD-10-CM: R45.89  ICD-9-CM: 799.29  2/23/2017        Sick ICD-10-CM: N01  ICD-9-CM: 799.9  2/21/2017        Fever and chills ICD-10-CM: R50.9  ICD-9-CM: 780.60  2/21/2017        Pain ICD-10-CM: R52  ICD-9-CM: 780.96  2/21/2017        Tachycardia ICD-10-CM: R00.0  ICD-9-CM: 785.0  11/15/2016        Hypokalemia ICD-10-CM: E87.6  ICD-9-CM: 276.8  11/15/2016        Anemia associated with chemotherapy ICD-10-CM: D64.81, T45.1X5A  ICD-9-CM: 285.3, E933.1  11/15/2016        Malignant neoplasm of colon ([facility] [address] ICD-10-CM: C18.9  ICD-9-CM: 153.9  9/26/2016        Lymphadenopathy ICD-10-CM: R59.1  ICD-9-CM: 785.6  9/9/2016            Ms. Alfonso Perdomo is a 64 y.o. female admitted on 7/15/2017 . She is a known patient of Dr. Sacha Dwyer with metastatic colon cancer, currently on second line treatment with FOLFIRI/Avastin. Last dose was given 6/28/17. She was seen by her PCP late last week and diagnosed with UTI and started bactrim. However, she developed worsening abdominal pain but no fevers and went to ER for evaluation. CT abd/pelvis was done and showed a colovesicle fistula. Surgery reviewed case but no surgical interventions were recommended. She was admitted given elevated lactic acid and resistant UTI. She was also anemic on admission with a hgb 6.8 but has received 2 units PRBCs. Iron stores were also low. She remains afebrile with stable vitals. Lactic acid is now <2.  Abdominal pain continues but is improved. We were consulted for further recommendations. PLAN:  Stage IV colon cancer  - Currently on therapy with avastin/folfiri. Last dose 6/28. Planning to go to [facility] later this month to investigate clinical trial so all chemotherapy on hold. Now with colovesical fistula     UTI  - Continue zosyn. Follow cultures to adjust antibiotics. Hemodynamically stable. May need prophylactic antibiotics after treatment completes     Anemia  - Transfuse 2 units PRBCs for anemia. Give 1 gram infed x1   7/17 Hgb improved to 7.6    Loss of appetite  7/17 Family reports patient is not eating (will take only 1-2 bites of food). Add Marinol 5mg BID. Consult DELMA. Akira Oropeza NP   [facility]  [address] [place] [address]  Office : [phone]  Fax : [phone]       Attending Addendum:  I personally evaluated the patient with Akira Oropeza, N.P.,  and agree with the assessment, findings and plan as documented. Appears stable, we will start Marinol and continue ABX.               Faye Rodriguez MD  [address] [place] [address]  Office : [phone]  Fax : [phone]

## 2017-07-17 NOTE — PROGRESS NOTES
Ordered PT. Met with spouse re: his worries about notifying insurance that patient is here. He is afraid he nahomy be penalized for not informing [employer] that he she was admitted. Sent messages to the business office and UR department to clarify. Ideally I would like for them to speak directly to spouse about the process and reassure him we doing what is expected bu the insurance company. [address].  Rom Wright

## 2017-07-18 NOTE — DISCHARGE INSTRUCTIONS
DISCHARGE SUMMARY from Nurse    The following personal items are in your possession at time of discharge:    Dental Appliances: None  Visual Aid: None     Home Medications: Sent home  Jewelry: Necklace  Clothing: Pajamas  Other Valuables: Cell Phone             PATIENT INSTRUCTIONS:    After general anesthesia or intravenous sedation, for 24 hours or while taking prescription Narcotics:  · Limit your activities  · Do not drive and operate hazardous machinery  · Do not make important personal or business decisions  · Do  not drink alcoholic beverages  · If you have not urinated within 8 hours after discharge, please contact your surgeon on call. Report the following to your surgeon:  · Excessive pain, swelling, redness or odor of or around the surgical area  · Temperature over 100.5  · Nausea and vomiting lasting longer than 4 hours or if unable to take medications  · Any signs of decreased circulation or nerve impairment to extremity: change in color, persistent  numbness, tingling, coldness or increase pain  · Any questions        What to do at Home:  Recommended activity: Activity as tolerated, complete all of your antibiotic     If you experience any of the following symptoms fever greater than 101, new or unrelieved pain, persistent nausea or vomiting , please follow up with PCP. *  Please give a list of your current medications to your Primary Care Provider. *  Please update this list whenever your medications are discontinued, doses are      changed, or new medications (including over-the-counter products) are added. *  Please carry medication information at all times in case of emergency situations. These are general instructions for a healthy lifestyle:    No smoking/ No tobacco products/ Avoid exposure to second hand smoke    Surgeon General's Warning:  Quitting smoking now greatly reduces serious risk to your health.     Obesity, smoking, and sedentary lifestyle greatly increases your risk for illness    A healthy diet, regular physical exercise & weight monitoring are important for maintaining a healthy lifestyle    You may be retaining fluid if you have a history of heart failure or if you experience any of the following symptoms:  Weight gain of 3 pounds or more overnight or 5 pounds in a week, increased swelling in our hands or feet or shortness of breath while lying flat in bed. Please call your doctor as soon as you notice any of these symptoms; do not wait until your next office visit. Recognize signs and symptoms of STROKE:    F-face looks uneven    A-arms unable to move or move unevenly    S-speech slurred or non-existent    T-time-call 911 as soon as signs and symptoms begin-DO NOT go       Back to bed or wait to see if you get better-TIME IS BRAIN. Warning Signs of HEART ATTACK     Call 911 if you have these symptoms:   Chest discomfort. Most heart attacks involve discomfort in the center of the chest that lasts more than a few minutes, or that goes away and comes back. It can feel like uncomfortable pressure, squeezing, fullness, or pain.  Discomfort in other areas of the upper body. Symptoms can include pain or discomfort in one or both arms, the back, neck, jaw, or stomach.  Shortness of breath with or without chest discomfort.  Other signs may include breaking out in a cold sweat, nausea, or lightheadedness. Don't wait more than five minutes to call 911 - MINUTES MATTER! Fast action can save your life. Calling 911 is almost always the fastest way to get lifesaving treatment. Emergency Medical Services staff can begin treatment when they arrive -- up to an hour sooner than if someone gets to the hospital by car. The discharge information has been reviewed with the patient. The patient verbalized understanding. Discharge medications reviewed with the patient and appropriate educational materials and side effects teaching were provided.
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
## 2017-07-18 NOTE — PROGRESS NOTES
Hourly rounds completed. The patient reported that she did not believe that the pain medication was sufficient. The patient was able to sleep off and on during the shift. The patient never appeared to be in distress during the shift.

## 2017-07-18 NOTE — DISCHARGE SUMMARY
Hospitalist Discharge Summary     Patient ID:  Dalton Bolden  [id number]  64 y.o.  1960  Admit date: 7/15/2017  2:54 PM  Discharge date and time: 7/18/2017  Attending: Jazmin Ham MD  PCP:  Jeanna Butts DO  Treatment Team: Attending Provider: Jazmin Ham MD; Consulting Provider: Sapna Perry MD; Utilization Review: Anna Marie Nazario RN    Principal Diagnosis UTI (urinary tract infection)   Principal Problem:    UTI (urinary tract infection) (7/15/2017)    Active Problems:    Malignant neoplasm of colon ([facility] [address] (9/26/2016)      Tachycardia (11/15/2016)             Hospital Course:  Please refer to the admission H&P for details of presentation. In summary Patient with a hx of stage IV colon cancer, anxiety disorder who presents with lower abdominal pain radiating to left side.  Pt recently started on bactrim for UTI but has not improved. UA with concerns for UTI. CT abd with colovesical fistula. Surgery has reported to ED MD that this is non-surgical. She was started on iv zosyn but unfortunately a urine culture was not obtained on admit. Will discharge with cipro to complete a 10 day course of antibiotics. Would recommend a prophylactic course of antibiotics after and have instructed patient to visit with her PCP for this prescription. Oncology was consulted during hospital course and she will continue follow-up outpatient. Has upcoming appt with [facility].      Significant Diagnostic Studies:   Final result (Exam End: 7/15/2017  4:32 PM) Open        Study Result      HISTORY:  abdominal pain, colon cancer, left flank pain. The patient is on  chemotherapy.     COMPARISON: 1/5/2017     EXAM: CT abdomen and pelvis with iv contrast     TECHNIQUE:   Thin section axial CT was performed from the lung bases through the symphysis  pubis during uneventful rapid bolus intravenous administration of 125 mL of  Isovue 370. Oral contrast was also administered.  Radiation dose reduction  techniques were used for this study. Our CT scanners use one or all of the  following: Automated exposure control, adjustment of the mA and/or kV according  to patient size, use of iterative reconstruction.     FINDINGS:     There is mild scarring at the left lung base.     CT abdomen: The liver and spleen enhances homogeneously without discrete  lesions. There is no biliary ductal dilatation. The gallbladder, pancreas and  adrenal glands are normal. The kidneys enhance symmetrically. Bowel loops in the  upper abdomen are normal. There is extensive retroperitoneal lymphadenopathy, as  was seen on the prior exam. The degree of adenopathy is stable when compared  with the prior study.     CT pelvis: There is persistent colonic bowel wall thickening at the junction of  the descending and sigmoid colon. There is pericolonic fat stranding as well as  present previously. There is air and debris seen within the left dome of the  bladder. Cannot exclude a colovesical fistula No pelvic adenopathy seen. No free  air or free fluid seen within the pelvis.      Bone window evaluation demonstrates no aggressive osseous lesions.     IMPRESSION  IMPRESSION:     1. Findings suggestive of a colovesical fistula. There is persistent bowel wall  thickening and pericolonic fat stranding. 2. Persistent retroperitoneal lymphadenopathy.          Labs: Results:       Chemistry Recent Labs      07/18/17 0643  07/17/17   1343  07/16/17   0726  07/15/17   1450   GLU  85  118*  81  118*   NA  143  143  140  135*   K  3.1*  3.0*  3.4*  3.2*   CL  108*  107  107  97*   CO2  27  26  24  27   BUN  6  6  9  10   CREA  0.61  0.85  0.89  1.21*   CA  9.0  8.6  8.8  9.3   AGAP  8  10  9  11   AP   --    --    --   164*   TP   --    --    --   7.6   ALB   --    --    --   2.3*   GLOB   --    --    --   5.3*   AGRAT   --    --    --   0.4*      CBC w/Diff Recent Labs      07/18/17   0643  07/17/17   1343  07/16/17   0726  07/15/17   1450 WBC  6.6  5.8  5.7  7.7   RBC  3.40*  3.33*  3.03*  3.05*   HGB  7.7*  7.6*  6.9*  6.8*   HCT  25.7*  25.6*  23.3*  22.6*   PLT  306  304  261  337   GRANS   --    --    --   79*   LYMPH   --    --    --   10*   EOS   --    --    --   1      Cardiac Enzymes No results for input(s): CPK, CKND1, DOUG in the last 72 hours. No lab exists for component: CKRMB, TROIP   Coagulation No results for input(s): PTP, INR, APTT in the last 72 hours. No lab exists for component: INREXT    Lipid Panel No results found for: CHOL, CHOLPOCT, CHOLX, CHLST, CHOLV, 392797, HDL, LDL, LDLC, DLDLP, 538108, VLDLC, VLDL, TGLX, TRIGL, TRIGP, TGLPOCT, CHHD, CHHDX   BNP No results for input(s): BNPP in the last 72 hours. Liver Enzymes Recent Labs      07/15/17   1450   TP  7.6   ALB  2.3*   AP  164*   SGOT  24      Thyroid Studies Lab Results   Component Value Date/Time    T4, Total 10.7 08/30/2016 04:34 PM    T3 Uptake 24 08/30/2016 04:34 PM    TSH 0.824 08/30/2016 04:34 PM            Discharge Exam:  Visit Vitals    /84    Pulse 90    Temp 97.7 °F (36.5 °C)    Resp 18    Ht 5' 5\" (1.651 m)    Wt 67.4 kg (148 lb 9.6 oz)    SpO2 93%    Breastfeeding No    BMI 24.73 kg/m2     General appearance: alert, cooperative, no distress, appears stated age  Lungs: clear to auscultation bilaterally  Heart: regular rate and rhythm, S1, S2 normal, no murmur, click, rub or gallop  Abdomen: soft, non-tender. Bowel sounds normal. No masses,  no organomegaly  Extremities: no cyanosis or edema  Neurologic: Grossly normal    Disposition:home  Discharge Condition: stable  Patient Instructions:   Current Discharge Medication List      START taking these medications    Details   dronabinol (MARINOL) 5 mg capsule Take 1 Cap by mouth Before breakfast and dinner. Max Daily Amount: 10 mg.  Indications: CANCER CHEMOTHERAPY-INDUCED NAUSEA AND VOMITING  Qty: 30 Cap, Refills: 0      ciprofloxacin HCl (CIPRO) 500 mg tablet Take 1 Tab by mouth two (2) times a day for 4 days. Qty: 8 Tab, Refills: 0         CONTINUE these medications which have NOT CHANGED    Details   PARoxetine (PAXIL) 40 mg tablet Take 1 Tab by mouth daily. Indications: GENERALIZED ANXIETY DISORDER, PANIC DISORDER  Qty: 30 Tab, Refills: 2    Associated Diagnoses: Malignant neoplasm of sigmoid colon (HCC)      cyclobenzaprine (FLEXERIL) 10 mg tablet Take 1 Tab by mouth three (3) times daily (with meals). Qty: 30 Tab, Refills: 0      HYDROcodone-acetaminophen (NORCO) 5-325 mg per tablet Take 1 Tab by mouth every six (6) hours as needed. Max Daily Amount: 4 Tabs. Qty: 120 Tab, Refills: 0    Associated Diagnoses: Pain      ALPRAZolam (XANAX) 0.25 mg tablet Take 1 Tab by mouth nightly as needed for Anxiety. Max Daily Amount: 0.25 mg.  Qty: 20 Tab, Refills: 0    Associated Diagnoses: Malignant neoplasm of colon, unspecified part of colon ([address] Vaginal bleeding      LORazepam (ATIVAN) 1 mg tablet Take 1 Tab by mouth every four (4) hours as needed for Anxiety. Max Daily Amount: 6 mg. Qty: 120 Tab, Refills: 0    Associated Diagnoses: Malignant neoplasm of colon, unspecified part of colon ([address] Vaginal bleeding      potassium chloride SR (MICRO-K) 10 mEq capsule Take 2 Caps by mouth two (2) times a day. Qty: 120 Cap, Refills: 1      ondansetron hcl (ZOFRAN) 8 mg tablet Take 1 Tab by mouth every eight (8) hours as needed for Nausea. Qty: 90 Tab, Refills: 3      lidocaine-prilocaine (EMLA) topical cream Apply  to affected area as needed. Apply 1/2 inch cream to port site 90 minutes prior to access  Qty: 30 g, Refills: 0      prochlorperazine (COMPAZINE) 10 mg tablet Take 1 Tab by mouth every six (6) hours as needed. Qty: 120 Tab, Refills: 3      ondansetron (ZOFRAN ODT) 4 mg disintegrating tablet Take 1 Tab by mouth every eight (8) hours as needed for Nausea.   Qty: 8 Tab, Refills: 2         STOP taking these medications       naproxen sodium (ANAPROX DS) 550 mg tablet Comments:   Reason for Stopping:               Activity: as tolerated  Diet: regular      Follow-up  ·   PCp in 1 week, oncology in 1-2 weeks  Time spent to discharge patient 35 minutes  Signed:  Alyssa Hernandez DO  7/18/2017  8:48 AM

## 2017-07-18 NOTE — PROGRESS NOTES
Discharge instructions and prescriptions provided and explained to patient, patient voiced understanding. Medication side effect sheet reviewed with pt. No home meds or valuables to return. Opportunity for questions provided. Instructed to call once ready to leave the floor. Paper copy of discharge instructions placed on paper chart.

## 2017-07-24 PROBLEM — N32.1 COLOVESICAL FISTULA: Status: ACTIVE | Noted: 2017-01-01

## 2017-08-14 PROBLEM — R52 INTRACTABLE PAIN: Status: ACTIVE | Noted: 2017-01-01
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
## 2017-08-14 NOTE — IP AVS SNAPSHOT
[address] 
[phone] Patient: Michael Cruz MRN: [id number] :1960 You are allergic to the following Allergen Reactions Penicillin G Unknown (comments) Daughter states patient has Pcn allergy Tolerates Jack Galloway PharmD Recent Documentation Height Weight Breastfeeding? BMI OB Status Smoking Status 1.676 m 65.8 kg No 23.4 kg/m2 Menopause Former Smoker Unresulted Labs Order Current Status CULTURE, BLOOD Preliminary result CULTURE, BLOOD Preliminary result Emergency Contacts Name Discharge Info Relation Home Work Mobile LeeJyoti DISCHARGE CAREGIVER [3] Daughter [21] [id number] Gorge Maurer  Spouse [3] [phone] About your hospitalization You were admitted on:  2017 You last received care in the[address] You were discharged on:  2017 Unit phone number:  [phone] Why you were hospitalized Your primary diagnosis was: Intractable Pain Providers Seen During Your Hospitalizations Provider Role Specialty Primary office phone Keo Nazario MD Attending Provider Emergency Medicine [phone] Corinne Market, MD Attending Provider Hematology and Oncology [phone] Your Primary Care Physician (PCP) Primary Care Physician Office Phone Office Fax Aura Crandall [phone] Follow-up Information Follow up With Details Comments Contact Info Jory Albarran DO   [address] 
[address] [facility] [address] 
[phone] Lavell Layton MD On 2017 Labs at 3:00pm and  at 3:30 per [facility]. [address]  [facility] [address] 
[phone] Your Appointments  2017  3:00 PM EDT  
 LAB with [address]  
1808 [facility] OUTREACH INSURANCE ([facility]) Ray Fritz 426 187 [facility]  
[phone] Monday August 21, 2017  3:30 PM EDT Follow Up with MD Ashia Brewer Hematology and Oncology [facility]) LELE/ Leland Munoz [address] [facility] [address]  
[phone] Wednesday August 23, 2017 LAPAROTOMY EXPLORATORY, COLOSTOMY with Elissa Armendariz MD  
[facility] ([address] [place] [address]) 6601 [facility] [address] [facility]  
[phone] Current Discharge Medication List  
  
START taking these medications Dose & Instructions Dispensing Information Comments Morning Noon Evening Bedtime  
 fentaNYL 25 mcg/hr PATCH Commonly known as:  Luciano Gibbs Replaces:  fentaNYL 50 mcg/hr PATCH Dose:  1 Patch 1 Patch by TransDERmal route every seventy-two (72) hours. Max Daily Amount: 1 Patch. Quantity:  10 Patch Refills:  0  
     
   
   
   
  
 levoFLOXacin 500 mg tablet Commonly known as:  Michael Rao Your next dose is:  08/18/17 bedtime Dose:  500 mg Take 1 Tab by mouth daily for 7 days. Quantity:  7 Tab Refills:  0  
     
   
   
   
  
  
 potassium chloride 20 mEq tablet Commonly known as:  K-DUR, KLOR-CON Replaces:  potassium chloride SA 10 mEq capsule Your next dose is: This evening Dose:  40 mEq Take 2 Tabs by mouth two (2) times a day. Quantity:  120 Tab Refills:  0 CONTINUE these medications which have NOT CHANGED Dose & Instructions Dispensing Information Comments Morning Noon Evening Bedtime  
 cyclobenzaprine 10 mg tablet Commonly known as:  FLEXERIL Your next dose is:  08/18/17 5pm  
   
 Dose:  10 mg Take 1 Tab by mouth three (3) times daily (with meals). Quantity:  30 Tab Refills:  0  
     
  
   
  
   
  
   
  
 dronabinol 5 mg capsule Commonly known as:  Yandel Delarosa Your next dose is:  08/18/17 4pm  
   
 Dose:  5 mg Take 1 Cap by mouth Before breakfast and dinner. Max Daily Amount: 10 mg. Indications: CANCER CHEMOTHERAPY-INDUCED NAUSEA AND VOMITING Quantity:  30 Cap Refills:  0 HYDROmorphone 4 mg tablet Commonly known as:  DILAUDID Your last dose was:  08/16/17 8:40am via IV Your next dose is:  Per as needed schedule Dose:  4-8 mg Take 1-2 Tabs by mouth every four (4) hours as needed for Pain. Max Daily Amount: 48 mg. Quantity:  100 Tab Refills:  0  
     
   
   
   
  
 lidocaine-prilocaine topical cream  
Commonly known as:  EMLA Apply  to affected area as needed. Apply 1/2 inch cream to port site 90 minutes prior to access Quantity:  30 g Refills:  0 LORazepam 1 mg tablet Commonly known as:  ATIVAN Your next dose is: Take per as needed schedule Dose:  1 mg Take 1 Tab by mouth every four (4) hours as needed for Anxiety. Max Daily Amount: 6 mg. Quantity:  120 Tab Refills:  0  
     
   
   
   
  
 ondansetron hcl 8 mg tablet Commonly known as:  Euell Diones Your next dose is: Take per as needed schedule Dose:  8 mg Take 1 Tab by mouth every eight (8) hours as needed for Nausea. Quantity:  90 Tab Refills:  3 PARoxetine 40 mg tablet Commonly known as:  PAXIL Your next dose is:  08/19/17 am  
   
 Dose:  40 mg Take 1 Tab by mouth daily. Indications: GENERALIZED ANXIETY DISORDER, PANIC DISORDER Quantity:  30 Tab Refills:  2  
     
  
   
   
   
  
 prochlorperazine 10 mg tablet Commonly known as:  COMPAZINE Your next dose is: Take per as needed schedule Dose:  10 mg Take 1 Tab by mouth every six (6) hours as needed. Quantity:  120 Tab Refills:  3 XANAX 2 mg tablet Generic drug:  ALPRAZolam  
Your next dose is: Take per as needed schedule. Dose:  2 mg Take 2 mg by mouth nightly as needed for Anxiety. Refills:  0 STOP taking these medications   
 fentaNYL 50 mcg/hr PATCH Commonly known as:  Pattricia Close Replaced by:  fentaNYL 25 mcg/hr PATCH  
   
  
 ondansetron 4 mg disintegrating tablet Commonly known as:  ZOFRAN ODT  
   
  
 potassium chloride SA 10 mEq capsule Commonly known as:  Tiarra Bumps Replaced by:  potassium chloride 20 mEq tablet Where to Get Your Medications These medications were sent to [facility] [address], [place] [address]  [address] [address] Phone:  [phone]  
  levoFLOXacin 500 mg tablet  
 potassium chloride 20 mEq tablet Information on where to get these meds will be given to you by the nurse or doctor. ! Ask your nurse or doctor about these medications  
  fentaNYL 25 mcg/hr PATCH Discharge Instructions DISCHARGE SUMMARY from Nurse The following personal items are in your possession at time of discharge: 
 
Dental Appliances: Uppers, Lowers, With patient Visual Aid: None Home Medications: None Jewelry: Necklace, With patient Clothing: Pajamas, With patient Other Valuables: Cell Phone PATIENT INSTRUCTIONS: 
 
After general anesthesia or intravenous sedation, for 24 hours or while taking prescription Narcotics: · Limit your activities · Do not drive and operate hazardous machinery · Do not make important personal or business decisions · Do  not drink alcoholic beverages · If you have not urinated within 8 hours after discharge, please contact your surgeon on call. Report the following to your surgeon: 
· Excessive pain, swelling, redness or odor of or around the surgical area · Temperature over 100.5 · Nausea and vomiting lasting longer than 4 hours or if unable to take medications · Any signs of decreased circulation or nerve impairment to extremity: change in color, persistent  numbness, tingling, coldness or increase pain · Any questions What to do at Home: 
Recommended activity: Activity as tolerated. If you experience any of the following symptoms temp > 100.4, unrelieved pain, nausea or vomiting, shortness of breath or fatigue not relieved with rest, please follow up with MD. 
 
 
*  Please give a list of your current medications to your Primary Care Provider. *  Please update this list whenever your medications are discontinued, doses are 
    changed, or new medications (including over-the-counter products) are added. *  Please carry medication information at all times in case of emergency situations. These are general instructions for a healthy lifestyle: No smoking/ No tobacco products/ Avoid exposure to second hand smoke Surgeon General's Warning:  Quitting smoking now greatly reduces serious risk to your health. Obesity, smoking, and sedentary lifestyle greatly increases your risk for illness A healthy diet, regular physical exercise & weight monitoring are important for maintaining a healthy lifestyle You may be retaining fluid if you have a history of heart failure or if you experience any of the following symptoms:  Weight gain of 3 pounds or more overnight or 5 pounds in a week, increased swelling in our hands or feet or shortness of breath while lying flat in bed. Please call your doctor as soon as you notice any of these symptoms; do not wait until your next office visit. Recognize signs and symptoms of STROKE: 
 
F-face looks uneven A-arms unable to move or move unevenly S-speech slurred or non-existent T-time-call 911 as soon as signs and symptoms begin-DO NOT go Back to bed or wait to see if you get better-TIME IS BRAIN. Warning Signs of HEART ATTACK Call 911 if you have these symptoms: 
? Chest discomfort.  Most heart attacks involve discomfort in the center of the chest that lasts more than a few minutes, or that goes away and comes back. It can feel like uncomfortable pressure, squeezing, fullness, or pain. ? Discomfort in other areas of the upper body. Symptoms can include pain or discomfort in one or both arms, the back, neck, jaw, or stomach. ? Shortness of breath with or without chest discomfort. ? Other signs may include breaking out in a cold sweat, nausea, or lightheadedness. Don't wait more than five minutes to call [address]! Fast action can save your life. Calling 911 is almost always the fastest way to get lifesaving treatment. Emergency Medical Services staff can begin treatment when they arrive  up to an hour sooner than if someone gets to the hospital by car. The discharge information has been reviewed with the patient. The patient verbalized understanding. Discharge medications reviewed with the patient and appropriate educational materials and side effects teaching were provided. Back Pain: Care Instructions Your Care Instructions Back pain has many possible causes. It is often related to problems with muscles and ligaments of the back. It may also be related to problems with the nerves, discs, or bones of the back. Moving, lifting, standing, sitting, or sleeping in an awkward way can strain the back. Sometimes you don't notice the injury until later. Arthritis is another common cause of back pain. Although it may hurt a lot, back pain usually improves on its own within several weeks. Most people recover in 12 weeks or less. Using good home treatment and being careful not to stress your back can help you feel better sooner. Follow-up care is a key part of your treatment and safety. Be sure to make and go to all appointments, and call your doctor if you are having problems. Its also a good idea to know your test results and keep a list of the medicines you take. How can you care for yourself at home? · Sit or lie in positions that are most comfortable and reduce your pain. Try one of these positions when you lie down: ¨ Lie on your back with your knees bent and supported by large pillows. ¨ Lie on the floor with your legs on the seat of a sofa or chair. [place] on your side with your knees and hips bent and a pillow between your legs. ¨ Lie on your stomach if it does not make pain worse. · Do not sit up in bed, and avoid soft couches and twisted positions. Bed rest can help relieve pain at first, but it delays healing. Avoid bed rest after the first day of back pain. · Change positions every 30 minutes. If you must sit for long periods of time, take breaks from sitting. Get up and walk around, or lie in a comfortable position. · Try using a heating pad on a low or medium setting for 15 to 20 minutes every 2 or 3 hours. Try a warm shower in place of one session with the heating pad. · You can also try an ice pack for 10 to 15 minutes every 2 to 3 hours. Put a thin cloth between the ice pack and your skin. · Take pain medicines exactly as directed. ¨ If the doctor gave you a prescription medicine for pain, take it as prescribed. ¨ If you are not taking a prescription pain medicine, ask your doctor if you can take an over-the-counter medicine. · Take short walks several times a day. You can start with 5 to 10 minutes, 3 or 4 times a day, and work up to longer walks. Walk on level surfaces and avoid hills and stairs until your back is better. · Return to work and other activities as soon as you can. Continued rest without activity is usually not good for your back. · To prevent future back pain, do exercises to stretch and strengthen your back and stomach. Learn how to use good posture, safe lifting techniques, and proper body mechanics. When should you call for help? Call your doctor now or seek immediate medical care if: 
· You have new or worsening numbness in your legs. · You have new or worsening weakness in your legs. (This could make it hard to stand up.) · You lose control of your bladder or bowels. Watch closely for changes in your health, and be sure to contact your doctor if: 
· Your pain gets worse. · You are not getting better after 2 weeks. Where can you learn more? Go to http://jose-lynn.info/. Enter X139 in the search box to learn more about \"Back Pain: Care Instructions. \" Current as of: March 21, 2017 Content Version: 11.3 © 3801-2869 [employer]. Care instructions adapted under license by [employer] (which disclaims liability or warranty for this information). If you have questions about a medical condition or this instruction, always ask your healthcare professional. [place] [address] any warranty or liability for your use of this information. Discharge Orders None Blue Rooster Announcement We are excited to announce that we are making your provider's discharge notes available to you in Blue Rooster. You will see these notes when they are completed and signed by the physician that discharged you from your recent hospital stay. If you have any questions or concerns about any information you see in Blue Rooster, please call the Health Information Department where you were seen or reach out to your Primary Care Provider for more information about your plan of care. Introducing [facility] HE[facility]! Dear Van Roach: 
Thank you for requesting a Blue Rooster account. Our records indicate that you already have an active Blue Rooster account. You can access your account anytime at [email or web address] Did you know that you can access your hospital and ER discharge instructions at any time in Blue Rooster? You can also review all of your test results from your hospital stay or ER visit. Additional Information If you have questions, please visit the Frequently Asked Questions section of the MyChart website at [email or web address] Remember, MyChart is NOT to be used for urgent needs. For medical emergencies, dial 911. Now available from your iPhone and Android! General Information Please provide this summary of care documentation to your next provider. Patient Signature:  ____________________________________________________________ Date:  ____________________________________________________________  
  
Agnieszka Spare Provider Signature:  ____________________________________________________________ Date:  ____________________________________________________________

## 2017-08-14 NOTE — ED TRIAGE NOTES
Pt arrives complaining of lower back pain and buttocks pain. Pt states this started a month ago, states the pain is excrutiating. Pt states she is an oncology patient, states she's supposed to have surgery on her bladder on the 23rd of august to remove a tumor. Pt alert and oriented, denies urinary symptoms. Pt states she had radiation in February in the area that's hurting.

## 2017-08-15 NOTE — PROGRESS NOTES
Skin assessment completed by this RN and Dayana Ramos RN. PT has not breakdown noted. Pt has bony prominence to sacrum; will apply an allevyn. Will continue with POC.

## 2017-08-15 NOTE — PROGRESS NOTES
TRANSFER - IN REPORT:    Verbal report received from [place] (name) on [facility]  being received from ED (unit) for routine progression of care      Report consisted of patients Situation, Background, Assessment and   Recommendations(SBAR). Information from the following report(s) Kardex, ED Summary, Intake/Output, MAR and Recent Results was reviewed with the receiving nurse. Opportunity for questions and clarification was provided. Assessment completed upon patients arrival to unit and care assumed.

## 2017-08-15 NOTE — PROGRESS NOTES
Request received requesting Sr. Tiffanie Shah. I talked with Daniella Culp who talked with patient by phone. Mikie Cohn also supported. Minerva Morris M.Div.

## 2017-08-15 NOTE — PROGRESS NOTES
[address] [place] [address] Hematology & Oncology        Inpatient Hematology / Oncology Progress Note      Admission Date: 2017 12:52 PM  Reason for Admission/Hospital Course: Intractable pain      24 Hour Events:  Afebrile  VSS  Pain not well controlled  BCNTD  Hgb 6.8      ROS:  Constitutional: Negative for fever, chills  CV:  Negative for chest pain, palpitations, edema. Respiratory: Negative for dyspnea, cough, wheezing. GI: Negative for nausea, abdominal pain, diarrhea. 10 point review of systems is otherwise negative with the exception of the elements mentioned above in the HPI. Allergies   Allergen Reactions    Penicillin G Unknown (comments)     Daughter states patient has Pcn allergy    Tolerates Zosyn Mayte Bass, PharmD       OBJECTIVE:  Patient Vitals for the past 8 hrs:   BP Temp Pulse Resp SpO2   08/15/17 1313 127/73 - (!) 113 - -   08/15/17 1233 93/60 98.5 °F (36.9 °C) (!) 108 18 98 %   08/15/17 0844 - - - - 95 %   08/15/17 0843 111/61 98.1 °F (36.7 °C) (!) 102 18 (!) 87 %     Temp (24hrs), Av.3 °F (36.8 °C), Min:98 °F (36.7 °C), Max:98.6 °F (37 °C)         Physical Exam:  Constitutional: Well developed  female in acute distress, lying uncomfortably in the hospital bed. HEENT: Normocephalic and atraumatic. Oropharynx is clear, mucous membranes are moist.  Pupils are equal, round, and reactive to light. Extraocular muscles are intact. Sclerae anicteric. Neck supple without JVD. No thyromegaly present. Lymph node   No palpable submandibular, cervical, supraclavicular, axillary or inguinal lymph nodes. Skin Warm and dry. No bruising and no rash noted. No erythema. No pallor. Respiratory Lungs are clear to auscultation bilaterally without wheezes, rales or rhonchi, normal air exchange without accessory muscle use. CVS Normal rate, regular rhythm and normal S1 and S2. No murmurs, gallops, or rubs. Abdomen Soft, nontender and nondistended, normoactive bowel sounds.   No palpable mass. No hepatosplenomegaly. Neuro Grossly nonfocal with no obvious sensory or motor deficits. MSK Normal range of motion in general.  No edema and no tenderness. Psych Appropriate mood and affect.         Labs:    Recent Labs      08/15/17   0233  08/14/17   1315   WBC  5.0  5.8   RBC  3.04*  3.41*   HGB  6.8*  7.7*   HCT  23.4*  25.9*   MCV  77.0*  76.0*   MCH  22.4*  22.6*   MCHC  29.1*  29.7*   RDW  20.7*  20.7*   PLT  280  295   GRANS  77  77   LYMPH  12*  13   MONOS  9  7   EOS  2  3   BASOS  0  0   IG  0.2  0.3   DF  AUTOMATED  AUTOMATED   ANEU  3.8  4.5   ABL  0.6  0.7   ABM  0.5  0.4   SUN  0.1  0.2   ABB  0.0  0.0   AIG  0.0  0.0      Recent Labs      08/15/17   0233  08/14/17   1315   NA  141  137   K  3.3*  2.7*   CL  103  98   CO2  30  31   AGAP  8  8   GLU  87  105*   BUN  8  11   CREA  0.52*  0.62   GFRAA  >60  >60   GFRNA  >60  >60   CA  9.4  9.7   SGOT  18  21   AP  97  102   TP  6.7  7.2   ALB  1.9*  2.0*   GLOB  4.8*  5.2*   AGRAT  0.4*  0.4*   MG  2.1   --          Imaging:      ASSESSMENT:    Problem List  Date Reviewed: 8/9/2017          Codes Class Noted    * (Principal)Intractable pain ICD-10-CM: R52  ICD-9-CM: 780.96  8/14/2017        Colovesical fistula ICD-10-CM: N32.1  ICD-9-CM: 596.1  7/24/2017        UTI (urinary tract infection) ICD-10-CM: N39.0  ICD-9-CM: 599.0  7/15/2017        Difficulty coping with disease ICD-10-CM: R45.89  ICD-9-CM: 799.29  2/23/2017        Sick ICD-10-CM: F79  ICD-9-CM: 799.9  2/21/2017        Fever and chills ICD-10-CM: R50.9  ICD-9-CM: 780.60  2/21/2017        Pain ICD-10-CM: R52  ICD-9-CM: 780.96  2/21/2017        Tachycardia ICD-10-CM: R00.0  ICD-9-CM: 785.0  11/15/2016        Hypokalemia ICD-10-CM: E87.6  ICD-9-CM: 276.8  11/15/2016        Anemia associated with chemotherapy ICD-10-CM: D64.81, T45.1X5A  ICD-9-CM: 285.3, E933.1  11/15/2016        Malignant neoplasm of colon ([facility] [address] ICD-10-CM: C18.9  ICD-9-CM: 153.9  9/26/2016 Lymphadenopathy ICD-10-CM: R59.1  ICD-9-CM: 785.6  9/9/2016                PLAN:  Metastatic Colon Cancer  - s/p FOLFOX and Avastin, radiation tx. FOLFIRI/Avastin. Last treatment 6/28/17 (Cycle 3)     Colovesical Fistula   - Dr. Adela Khan to present to tumor board. Surgical options?  -8/15 Spouse advises surgery is scheduled for next and that appointment to [facility] is not until early September     Pain (Lower back and left hip)  -Dilaudid 1-2 mg IV Q 3 hours. Fentanyl 50 mcg patch too strong. Patient not wearing.  - 8/15 Add Fentanyl Patch 25 mcg Q 72 hours to pain regimen     Acute Febrile Illness  -Blood cultures x 2, Zosyn IV  -8/15 BCNTD     Hypokalemia  - K+ 2.7. KCL 40 garima IV  -8/15 K+ 3.3, KCL 40 meq IV    Anemia  -8/15 Hgb 6.8, Transfuse PRBCs X 2 units                [facility] Hematology & Oncology  [address] [place] [address]  Office : [phone]  Fax : [phone]       Attending Addendum:  I personally evaluated the patient with Ashlee Shepherd N.P.,  and agree with the assessment, findings and plan as documented. She may benefit from a 25 mcg/hr Fentanyl patch.               Tyrell Bates MD  [address] [place] [address]  Office : [phone]  Fax : [phone]

## 2017-08-15 NOTE — PROGRESS NOTES
END OF SHIFT NOTE:    Intake/Output      Voiding: YES  Catheter: NO  Drain:              Stool:  0 occurrences. Emesis:  0 occurrences. VITAL SIGNS  Patient Vitals for the past 12 hrs:   Temp Pulse Resp BP SpO2   08/15/17 0402 98.4 °F (36.9 °C) (!) 108 18 127/76 95 %   08/14/17 2317 98.2 °F (36.8 °C) (!) 114 18 136/83 93 %   08/14/17 2020 98 °F (36.7 °C) (!) 114 16 129/78 97 %   08/14/17 1825 98.6 °F (37 °C) (!) 102 14 121/61 94 %       Pain Assessment  Pain 1  Pain Scale 1: Numeric (0 - 10) (08/15/17 0545)  Pain Intensity 1: 0 (08/15/17 0545)  Patient Stated Pain Goal: 0 (08/15/17 0545)  Pain Reassessment 1: Yes (08/15/17 0545)  Pain Onset 1: pta (08/15/17 0525)  Pain Location 1: Back (08/15/17 0525)  Pain Orientation 1: Lower (08/15/17 0525)  Pain Description 1: Aching (08/15/17 0525)  Pain Intervention(s) 1: Medication (see MAR) (08/15/17 0525)    Ambulating  Yes    Additional Information: Pt rested well overnight. Only complaint was pain 4 doses of pain medication given. Shift report given to richa Call RN nurse at the bedside.     Jammie Harris RN

## 2017-08-16 NOTE — PROGRESS NOTES
Pt is resting quietly in bed. No needs voiced. No SOB or pain is reported at this time. RRR. 1 unit of PRBC needs to infuse. Call light is within reach. Will continue to monitor.

## 2017-08-16 NOTE — PROGRESS NOTES
END OF SHIFT NOTE: Pt lying in bed with respirations even and unlabored on two liters of oxygen via nasal cannula. Cont of bladder. Continues to require moderate assist of one with stand pivot transfer to bedside commode. Weakness continues. Pain medication administered PO and IV for increase in pain to abdomen and back with effective results voiced. No s/sx of acute distress at this time. Intake/Output      Voiding: YES  Catheter: NO  Drain:              Stool:  0 occurrences. Emesis:  0 occurrences. VITAL SIGNS  Patient Vitals for the past 12 hrs:   Temp Pulse Resp BP SpO2   08/15/17 2044 98.3 °F (36.8 °C) 89 18 107/73 98 %   08/15/17 2028 98.3 °F (36.8 °C) 88 18 112/78 98 %   08/15/17 1912 98.5 °F (36.9 °C) (!) 104 18 106/69 96 %   08/15/17 1820 97.9 °F (36.6 °C) 98 18 110/72 99 %   08/15/17 1717 97.8 °F (36.6 °C) (!) 101 20 118/68 97 %   08/15/17 1617 97.8 °F (36.6 °C) - - - -   08/15/17 1600 98.3 °F (36.8 °C) (!) 105 - - -   08/15/17 1547 98.3 °F (36.8 °C) (!) 110 18 132/78 99 %   08/15/17 1530 97.4 °F (36.3 °C) 91 16 115/76 98 %   08/15/17 1527 97.7 °F (36.5 °C) - - - -   08/15/17 1313 97.7 °F (36.5 °C) (!) 113 16 127/73 -   08/15/17 1233 98.5 °F (36.9 °C) (!) 108 18 93/60 98 %       Pain Assessment  Pain 1  Pain Scale 1: Numeric (0 - 10) (08/15/17 1915)  Pain Intensity 1: 4 (08/15/17 1915)  Patient Stated Pain Goal: 0 (08/15/17 1313)  Pain Reassessment 1: Yes (08/15/17 1915)  Pain Onset 1: pta (08/15/17 0525)  Pain Location 1: Back; Abdomen (08/15/17 1915)  Pain Orientation 1: Left (08/15/17 1313)  Pain Description 1: Aching (08/15/17 0915)  Pain Intervention(s) 1: Medication (see MAR) (08/15/17 1915)    Ambulating contact guard assist x one     Additional Information:     Shift report given to oncoming nurse at the bedside.     Teto Aceves RN

## 2017-08-16 NOTE — PROGRESS NOTES
Blood infusion stopped. Pt tolerated infusion well with no issues. Vital signs recorded. Family is at the bedside. Will continue to monitor.

## 2017-08-16 NOTE — DISCHARGE SUMMARY
[facility]: In Patient Hematology / Oncology Discharge Summary Note    Patient ID:  Charlotte Cancer  [id number]  64 y.o.  1960    Admit Date: 8/14/2017    Discharge Date: 8/16/2017    Admission Diagnoses: Intractable pain    Discharge Diagnoses:  Principal Diagnosis: Intractable pain  Principal Problem:    Intractable pain (8/14/2017)        Hospital Course:    Ms. Giovany Lee is a 64 y.o. female admitted on 8/14/2017 with acute febrile illness and intractable pain.      She is a known patient of ours with metastatic colon cancer status post radiation and chemotherapy. Most current therapy was FOLFIRI/Avastin given about six weeks ago. She has a new colovesicular fistula for which she has pending surgery scheduled with Dr. Aniket Merritt. She presented to the emergency department with complaints of low back pain and left hip pain that had worsened over the course of several days. She was also febrile. Blood cultures have been negative and she was treated with IV Zosyn. She will go home on PO Levaquin x 7 days to complete a 10 day course. She was started on Fentanyl 25 mcg patch every 72 hours and continues Dilaudid 4-8 mg every 4 hours as needed for pain which has been working well. Also while admitted, she received two units of PRBC's. She follows with Dr. Aniket Merritt tomorrow for further surgical plans. Plan to follow up in the office in 1-2 weeks with Dr. Ni Mireles.        Consults:  None    Significant Diagnostic Studies:   Pelvis 8/14/2017     HISTORY: Chronic pelvic pain, history of colon cancer     FINDINGS: Single AP view. Bony pelvic ring is intact. Normal alignment pubic  symphysis and SI joints. Hips appear unremarkable. Small bone island in the  right iliac bone.     IMPRESSION: Exam unremarkable for age. Lumbar Spine 08/14/17     INDICATION:  Chronic low back pain.  History of colon cancer      AP and lateral views of the lumbar spine were obtained     FINDINGS: Lumbar vertebral body morphology, height, and alignment all within  normal limits. Mild disc space narrowing L4-L5 and L5-S1. No aggressive osseous  lesions.     IMPRESSION: Evidence of degenerative disc disease L4-L5 and L5-S1. Allergies   Allergen Reactions    Penicillin G Unknown (comments)     Daughter states patient has Pcn allergy    Tolerates Zosyn - Patrick Gaxiola, PharmD       OBJECTIVE:  Patient Vitals for the past 8 hrs:   BP Temp Pulse Resp SpO2 Weight   17 0718 146/76 97.1 °F (36.2 °C) (!) 109 18 99 % -   17 0417 - - - - - 152 lb 11.2 oz (69.3 kg)     Temp (24hrs), Av °F (36.7 °C), Min:97.1 °F (36.2 °C), Max:98.5 °F (36.9 °C)     07 -  1900  In: 240 [P.O.:240]  Out: 900 [Urine:900]    Physical Exam:  Constitutional: Oriented to person, place, and time. Well-developed and well-nourished. HEENT: Normocephalic and atraumatic. Oropharynx is clear and moist.   Conjunctivae and EOM are normal. No scleral icterus. Neck supple. Lymph node   No palpable submandibular, cervical, supraclavicular, axillary and lymph nodes. Skin Warm and dry. No bruising and no rash noted. No erythema. No pallor. Respiratory Effort normal and breath sounds normal.  No respiratory distress. No wheezes. No rales. No tenderness. CVS Mildly tachycardic rate, regular rhythm and normal heart sounds. Exam reveals no gallop, no friction and no rub. No murmur heard. Abdomen Soft. Bowel sounds are normal. Exhibits no distension. There is mild tenderness. There is no rebound and no guarding. Neuro No obvious focal deficits. MSK Normal range of motion. No edema and no tenderness. Psych Normal mood, affect, behavior, judgment and thought content.       Labs:  Recent Labs      17   0015  08/15/17   0233  17   1315   WBC   --   5.0  5.8   RBC   --   3.04*  3.41*   HGB  8.9*  6.8*  7.7*   HCT  29.2*  23.4*  25.9*   MCV   --   77.0*  76.0*   MCH   --   22.4*  22.6*   MCHC   --   29.1*  29.7*   RDW   -- 20.7*  20.7*   PLT   --   280  295   GRANS   --   77  77   LYMPH   --   12*  13   MONOS   --   9  7   EOS   --   2  3   BASOS   --   0  0   IG   --   0.2  0.3   DF   --   AUTOMATED  AUTOMATED   ANEU   --   3.8  4.5   ABL   --   0.6  0.7   ABM   --   0.5  0.4   SUN   --   0.1  0.2   ABB   --   0.0  0.0   AIG   --   0.0  0.0    Recent Labs      08/15/17   0233  08/14/17   1315   NA  141  137   K  3.3*  2.7*   CL  103  98   CO2  30  31   AGAP  8  8   GLU  87  105*   BUN  8  11   CREA  0.52*  0.62   GFRAA  >60  >60   GFRNA  >60  >60   CA  9.4  9.7   SGOT  18  21   AP  97  102   TP  6.7  7.2   ALB  1.9*  2.0*   GLOB  4.8*  5.2*   AGRAT  0.4*  0.4*   MG  2.1   --        ASSESSMENT:    Principal Problem:    Intractable pain (8/14/2017)      Current Discharge Medication List      START taking these medications    Details   fentaNYL (DURAGESIC) 25 mcg/hr PATCH 1 Patch by TransDERmal route every seventy-two (72) hours. Max Daily Amount: 1 Patch. Qty: 10 Patch, Refills: 0      levoFLOXacin (LEVAQUIN) 500 mg tablet Take 1 Tab by mouth daily for 7 days. Qty: 7 Tab, Refills: 0         CONTINUE these medications which have NOT CHANGED    Details   ALPRAZolam (XANAX) 2 mg tablet Take 2 mg by mouth nightly as needed for Anxiety. ondansetron hcl (ZOFRAN) 8 mg tablet Take 1 Tab by mouth every eight (8) hours as needed for Nausea. Qty: 90 Tab, Refills: 3      HYDROmorphone (DILAUDID) 4 mg tablet Take 1-2 Tabs by mouth every four (4) hours as needed for Pain. Max Daily Amount: 48 mg. Qty: 100 Tab, Refills: 0    Associated Diagnoses: Malignant neoplasm of sigmoid colon (HCC)      LORazepam (ATIVAN) 1 mg tablet Take 1 Tab by mouth every four (4) hours as needed for Anxiety. Max Daily Amount: 6 mg. Qty: 120 Tab, Refills: 0    Associated Diagnoses: Malignant neoplasm of colon, unspecified part of colon ([address] Vaginal bleeding      potassium chloride SR (MICRO-K) 10 mEq capsule Take 2 Caps by mouth two (2) times a day.   Qty: 120 Cap, Refills: 1      lidocaine-prilocaine (EMLA) topical cream Apply  to affected area as needed. Apply 1/2 inch cream to port site 90 minutes prior to access  Qty: 30 g, Refills: 0      prochlorperazine (COMPAZINE) 10 mg tablet Take 1 Tab by mouth every six (6) hours as needed. Qty: 120 Tab, Refills: 3      dronabinol (MARINOL) 5 mg capsule Take 1 Cap by mouth Before breakfast and dinner. Max Daily Amount: 10 mg. Indications: CANCER CHEMOTHERAPY-INDUCED NAUSEA AND VOMITING  Qty: 30 Cap, Refills: 0      PARoxetine (PAXIL) 40 mg tablet Take 1 Tab by mouth daily. Indications: GENERALIZED ANXIETY DISORDER, PANIC DISORDER  Qty: 30 Tab, Refills: 2    Associated Diagnoses: Malignant neoplasm of sigmoid colon (HCC)      cyclobenzaprine (FLEXERIL) 10 mg tablet Take 1 Tab by mouth three (3) times daily (with meals). Qty: 30 Tab, Refills: 0         STOP taking these medications       fentaNYL (DURAGESIC) 50 mcg/hr PATCH Comments:   Reason for Stopping:         ondansetron (ZOFRAN ODT) 4 mg disintegrating tablet Comments:   Reason for Stopping:             PLAN:    Follow-up Appointments   Procedures    FOLLOW UP VISIT Appointment in: Other (Specify) Dr. Malinda Nieves within one week - please make an apt (MD only). Dr. Malinda Nieves within one week - please make an apt (MD only). Standing Status:   Standing     Number of Occurrences:   1     Order Specific Question:   Appointment in     Answer: Other (Specify)     Also plan for surgery follow up tomorrow as previously planned.         Brian Hurt NP  [facility], [address]  Office : [phone]  Fax : [phone]

## 2017-08-16 NOTE — PROGRESS NOTES
Care Management Interventions  PCP Verified by CM: Yes  Transition of Care Consult (CM Consult): Discharge Planning  Discharge Durable Medical Equipment: Yes (BSC and WC from [facility])  Current Support Network: Lives with Spouse, Own Home  Confirm Follow Up Transport: Family  Plan discussed with Pt/Family/Caregiver: Yes  Freedom of Choice Offered: Yes  Discharge Location  Discharge Placement: Home with family assistance    Contacted by RN regarding dme and other dc needs. Met with pt and spouse. DC plan discussed  Pt to return home with family to assist. Pt has [employer] with RX coverage. Pt with limited mobility. WC and BSC ordered from [facility]. No HH needs indicated at this time. Possible dc today. SW available if other needs arise.

## 2017-08-16 NOTE — PROGRESS NOTES
Bedside report received. Pt alert and oriented x4.  hgb obtained by nsg via left upper chest port. Will resume care this shift.

## 2017-08-16 NOTE — PROGRESS NOTES
Pt has rested quietly during the 11p-7a shift. Family at bedside. No resp distress noted, no complaints of pain. ivf infusing via left upper chest port without diff. Labs drawn this shift. sr up x2, bed lowered and locked, and call light within reach.

## 2017-08-16 NOTE — PROGRESS NOTES
[facility]        Inpatient Hematology / Oncology Progress Note      Admission Date: 2017 12:52 PM  Reason for Admission/Hospital Course: Intractable pain      24 Hour Events:  Afebrile/VSS. Pain now well controlled but now with hallucinations and altered mental status. Patient usually takes Ativan 1 mg every 4 hours at home and was ordered PRN here - had not requested any. No other new issues. ROS:  Constitutional: Negative for fever, chills. CV:  Negative for chest pain, palpitations, edema. Respiratory: Negative for dyspnea, cough, wheezing. GI: Negative for nausea, abdominal pain, diarrhea. 10 point review of systems is otherwise negative with the exception of the elements mentioned above in the HPI. Allergies   Allergen Reactions    Penicillin G Unknown (comments)     Daughter states patient has Pcn allergy    Tolerates Zosyn - John Ramos, PharmD       OBJECTIVE:  Patient Vitals for the past 8 hrs:   BP Temp Pulse Resp SpO2   17 1130 129/86 98.3 °F (36.8 °C) (!) 108 18 100 %     Temp (24hrs), Av °F (36.7 °C), Min:97.1 °F (36.2 °C), Max:98.5 °F (36.9 °C)     0701 -  1900  In: 240 [P.O.:240]  Out: 900 [Urine:900]    Physical Exam:  Constitutional: Well developed  female in acute distress, lying uncomfortably in the hospital bed. HEENT: Normocephalic and atraumatic. Oropharynx is clear, mucous membranes are moist.  Extraocular muscles are intact. Sclerae anicteric. Neck supple. Lymph node   No palpable submandibular, cervical, supraclavicular, axillary lymph nodes. Skin Warm and dry. No bruising and no rash noted. No erythema. No pallor. Respiratory Lungs are clear to auscultation bilaterally without wheezes, rales or rhonchi, normal air exchange without accessory muscle use. CVS Normal rate, regular rhythm and normal S1 and S2. No murmurs, gallops, or rubs.    Abdomen Soft, nontender and nondistended, normoactive bowel sounds. No palpable mass. No hepatosplenomegaly. Neuro Grossly nonfocal with no obvious sensory or motor deficits. MSK Normal range of motion in general.  No edema and no tenderness. Psych Altered mental status/hallucinations. Labs:      Recent Labs      08/16/17   0015  08/15/17   0233  08/14/17   1315   WBC   --   5.0  5.8   RBC   --   3.04*  3.41*   HGB  8.9*  6.8*  7.7*   HCT  29.2*  23.4*  25.9*   MCV   --   77.0*  76.0*   MCH   --   22.4*  22.6*   MCHC   --   29.1*  29.7*   RDW   --   20.7*  20.7*   PLT   --   280  295   GRANS   --   77  77   LYMPH   --   12*  13   MONOS   --   9  7   EOS   --   2  3   BASOS   --   0  0   IG   --   0.2  0.3   DF   --   AUTOMATED  AUTOMATED   ANEU   --   3.8  4.5   ABL   --   0.6  0.7   ABM   --   0.5  0.4   SUN   --   0.1  0.2   ABB   --   0.0  0.0   AIG   --   0.0  0.0        Recent Labs      08/15/17   0233  08/14/17   1315   NA  141  137   K  3.3*  2.7*   CL  103  98   CO2  30  31   AGAP  8  8   GLU  87  105*   BUN  8  11   CREA  0.52*  0.62   GFRAA  >60  >60   GFRNA  >60  >60   CA  9.4  9.7   SGOT  18  21   AP  97  102   TP  6.7  7.2   ALB  1.9*  2.0*   GLOB  4.8*  5.2*   AGRAT  0.4*  0.4*   MG  2.1   --          Imaging:  Pelvis 8/14/2017     HISTORY: Chronic pelvic pain, history of colon cancer     FINDINGS: Single AP view. Bony pelvic ring is intact. Normal alignment pubic  symphysis and SI joints. Hips appear unremarkable. Small bone island in the  right iliac bone.     IMPRESSION  IMPRESSION: Exam unremarkable for age. Lumbar Spine 8/14/17     INDICATION:  Chronic low back pain. History of colon cancer      AP and lateral views of the lumbar spine were obtained     FINDINGS: Lumbar vertebral body morphology, height, and alignment all within  normal limits. Mild disc space narrowing L4-L5 and L5-S1.  No aggressive osseous  lesions.     IMPRESSION  IMPRESSION: Evidence of degenerative disc disease L4-L5 and L5-S1.    ASSESSMENT:    Problem List  Date Reviewed: 8/9/2017          Codes Class Noted    * (Principal)Intractable pain ICD-10-CM: R52  ICD-9-CM: 780.96  8/14/2017        Colovesical fistula ICD-10-CM: N32.1  ICD-9-CM: 596.1  7/24/2017        UTI (urinary tract infection) ICD-10-CM: N39.0  ICD-9-CM: 599.0  7/15/2017        Difficulty coping with disease ICD-10-CM: R45.89  ICD-9-CM: 799.29  2/23/2017        Sick ICD-10-CM: R28  ICD-9-CM: 799.9  2/21/2017        Fever and chills ICD-10-CM: R50.9  ICD-9-CM: 780.60  2/21/2017        Pain ICD-10-CM: R52  ICD-9-CM: 780.96  2/21/2017        Tachycardia ICD-10-CM: R00.0  ICD-9-CM: 785.0  11/15/2016        Hypokalemia ICD-10-CM: E87.6  ICD-9-CM: 276.8  11/15/2016        Anemia associated with chemotherapy ICD-10-CM: D64.81, T45.1X5A  ICD-9-CM: 285.3, E933.1  11/15/2016        Malignant neoplasm of colon ([facility] [address] ICD-10-CM: C18.9  ICD-9-CM: 153.9  9/26/2016        Lymphadenopathy ICD-10-CM: R59.1  ICD-9-CM: 785.6  9/9/2016                PLAN:  - Metastatic Colon Cancer  - s/p FOLFOX and Avastin, radiation tx. FOLFIRI/Avastin. Last treatment 6/28/17 (Cycle 3).    - Colovesical Fistula   08/16/17 Has follow up with Alisha Jones tomorrow to discuss surgical plans.      - Pain (Lower back and left hip)  Dilaudid 1-2 mg IV Q 3 hours. Fentanyl 50 mcg patch too strong. Patient not wearing. 8/15/17 Add Fentanyl Patch 25 mcg Q 72 hours to pain regimen. 8/16/17 Pain controlled well but now with altered mental status. Did not tolerate Fentanyl on previous occasion.      - Acute Febrile Illness  Blood cultures x 2, Zosyn IV  08/16/17 BC-NTD. Plan for Levaquin at discharge.      - Hypokalemia  - K+ 2.7. KCL 40 garima IV  8/16/17  K+ 3.3, KCL 40 meq IV. - Anemia  08/15/17 Hgb 6.8, Transfuse PRBCs X 2 units. 08/16/17 Hgb 8.9.     - Altered Mental Status  08/19/17 Cancel discharge. Stop Fentanyl patch - consider MS Contin/OxyContin in the morning for better pain control. She obviously cannot tolerate Fentanyl.  Schedule Ativan every 6 hours (taking every 4 hours ATC at home). Carter Rachel NP   [facility]  [address] [address]  Office : [phone]  Fax : [phone]     Attending Addendum:  I personally evaluated the patient with Leonard Anglin N.P.,  and agree with the assessment, findings and plan as documented. She does not seem to be able to tolerate Fentanyl.               Calin Colon MD  [facility]  [address] [address]  Office : [phone]  Fax : [phone]

## 2017-08-16 NOTE — PROGRESS NOTES
Problem: Falls - Risk of  Goal: *Absence of Falls  Document Ab Fall Risk and appropriate interventions in the flowsheet.    Outcome: Progressing Towards Goal  Fall Risk Interventions:  Mobility Interventions: Assess mobility with egress test     Mentation Interventions: Adequate sleep, hydration, pain control     Medication Interventions: Patient to call before getting OOB     Elimination Interventions: Patient to call for help with toileting needs, Toileting schedule/hourly rounds     History of Falls Interventions: Room close to nurse's station

## 2017-08-16 NOTE — PROGRESS NOTES
END OF SHIFT NOTE:    Pt lying in bed with respirations even and unlabored on Room air. Alert and oriented x two. Confusion continues but has improved. Cont of bladder  with stand pivot transfer to bedside commode. Weakness with transfers  continues. Pain medication administered  IV one for increase in pain with effect. Fentanyl patch removed related to increase in confusion. New orders received and processed. Discharge on hold at this time.  at bedside assisting in care.    Intake/Output      Voiding: YES  Catheter: NO  Drain:               Stool:  0 occurrences. Emesis:  0 occurrences. VITAL SIGNS  Patient Vitals for the past 12 hrs:   Temp Pulse Resp BP SpO2   08/16/17 1630 97.9 °F (36.6 °C) 99 18 133/70 100 %   08/16/17 1130 98.3 °F (36.8 °C) (!) 108 18 129/86 100 %       Pain Assessment  Pain 1  Pain Scale 1: Numeric (0 - 10) (08/16/17 0841)  Pain Intensity 1: 9 (08/16/17 0841)  Patient Stated Pain Goal: 0 (08/16/17 0003)  Pain Reassessment 1: Patient sleeping (08/16/17 0103)  Pain Onset 1: pta (08/15/17 0525)  Pain Location 1: Abdomen;Back (08/16/17 0003)  Pain Orientation 1: Left (08/15/17 1313)  Pain Description 1: Aching (08/16/17 0841)  Pain Intervention(s) 1: Medication (see MAR) (08/16/17 9603)    Ambulating      Additional Information:     Shift report given to oncoming nurse at the bedside.     Sara Viveros RN

## 2017-08-17 NOTE — PROGRESS NOTES
[address] [facility] witnessed/unwitnessed fall occurred on 08/17/2017 at [phone]. The answers to the following questions summarize the fall:     · In the patient's own words,:  · What was he/she doing when he/she fell? Being assisted to restroom by spouse. · What are his/her complaints? Right hip pain; left groin pain    · Nurse:  · Document observation, treatment, conversation, follow-up, and patient response. yes    · What was the patient's condition when found (i.e., pain, symptoms, cuts, bruises)? Right hip pain, left groin pain, bruise right side of mid back    · What specific complaints did the patient have? pain     · What did the staff do when patient was found (i.e., vital signs, returned to bed with fall alarm, side rails up)? VS, returned to bed w/alarm, side rails x 3, assessment,    · Which physician was notified? Yes; Per Melissa Aranda, [facility], assess for further increase in pain. Continue to monitor pt.     Bel Allison RN
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
## 2017-08-17 NOTE — PROGRESS NOTES
New onset altered mental status with hallucinations. Pt restless attempting to get OOB and tachycardic. Removed Fentanyl patch and Reviewed home medications. New orders received and processed. Family made aware.

## 2017-08-17 NOTE — PROGRESS NOTES
Pt's spouse still feels that patient is sluggish despite the fentanyl patch being removed yesterday morning. Myself and the NP with hem/onc reviewed pt's PTA and current med list- the thought is the flexeril may be causing the sluggish behavior. Upon assessment pt still oriented x 4, but very \"loopy\" and talking slowly. NP to evaluate med list with MD and adjust accordingly.

## 2017-08-17 NOTE — PROGRESS NOTES
[facility]        Inpatient Hematology / Oncology Progress Note      Admission Date: 2017 12:52 PM  Reason for Admission/Hospital Course: Intractable pain      24 Hour Events:  Afebrile/VSS. Pain controlled but now with hallucinations and altered mental status. Medication changes - Stopped Fentanyl 25 jose patch, stopped Dilaudid 1-2 mg IV, stopped Flexeril, modified Ativan 1 mg PO to Q 6 prn, modified Dilaudid PO to 4-6 mg.  Consult to PT      ROS:  Constitutional: Negative for fever, chills. CV:  Negative for chest pain, palpitations, edema. Respiratory: Negative for dyspnea, cough, wheezing. GI: Negative for nausea, abdominal pain, diarrhea. 10 point review of systems is otherwise negative with the exception of the elements mentioned above in the HPI. Allergies   Allergen Reactions    Penicillin G Unknown (comments)     Daughter states patient has Pcn allergy    Tolerates Zosyn - Liza Miller, PharmD       OBJECTIVE:  Patient Vitals for the past 8 hrs:   BP Temp Pulse Resp SpO2   17 0701 142/87 98.2 °F (36.8 °C) (!) 111 18 93 %   17 0438 134/88 98.1 °F (36.7 °C) (!) 107 18 96 %     Temp (24hrs), Av.1 °F (36.7 °C), Min:97.9 °F (36.6 °C), Max:98.3 °F (36.8 °C)         Physical Exam:  Constitutional: Well developed  female in no distress, lying in the hospital bed. HEENT: Normocephalic and atraumatic. Oropharynx is clear, mucous membranes are moist.  Extraocular muscles are intact. Sclerae anicteric. Neck supple. Lymph node   No palpable submandibular, cervical, supraclavicular, axillary lymph nodes. Skin Warm and dry. No bruising and no rash noted. No erythema. No pallor. Respiratory Lungs are clear to auscultation bilaterally without wheezes, rales or rhonchi, normal air exchange without accessory muscle use. CVS Normal rate, regular rhythm and normal S1 and S2. No murmurs, gallops, or rubs.    Abdomen Soft, nontender and nondistended, normoactive bowel sounds. No palpable mass. No hepatosplenomegaly. Neuro Grossly nonfocal with no obvious sensory or motor deficits. MSK Normal range of motion in general.  No edema and no tenderness. Psych Altered mental status/hallucinations. Labs:      Recent Labs      08/17/17   0305  08/16/17   0015  08/15/17   0233  08/14/17   1315   WBC  7.4   --   5.0  5.8   RBC  4.20   --   3.04*  3.41*   HGB  10.3*  8.9*  6.8*  7.7*   HCT  32.3*  29.2*  23.4*  25.9*   MCV  76.9*   --   77.0*  76.0*   MCH  24.5*   --   22.4*  22.6*   MCHC  31.9   --   29.1*  29.7*   RDW  19.1*   --   20.7*  20.7*   PLT  320   --   280  295   GRANS  82*   --   77  77   LYMPH  10*   --   12*  13   MONOS  6   --   9  7   EOS  2   --   2  3   BASOS  0   --   0  0   IG  0.1   --   0.2  0.3   DF  AUTOMATED   --   AUTOMATED  AUTOMATED   ANEU  6.1   --   3.8  4.5   ABL  0.7   --   0.6  0.7   ABM  0.5   --   0.5  0.4   SUN  0.1   --   0.1  0.2   ABB  0.0   --   0.0  0.0   AIG  0.0   --   0.0  0.0        Recent Labs      08/17/17   0305  08/15/17   0233  08/14/17   1315   NA  140  141  137   K  2.8*  3.3*  2.7*   CL  98  103  98   CO2  29  30  31   AGAP  13  8  8   GLU  109*  87  105*   BUN  7  8  11   CREA  0.51*  0.52*  0.62   GFRAA  >60  >60  >60   GFRNA  >60  >60  >60   CA  9.8  9.4  9.7   SGOT   --   18  21   AP   --   97  102   TP   --   6.7  7.2   ALB   --   1.9*  2.0*   GLOB   --   4.8*  5.2*   AGRAT   --   0.4*  0.4*   MG  1.9  2.1   --          Imaging:  Pelvis 8/14/2017     HISTORY: Chronic pelvic pain, history of colon cancer     FINDINGS: Single AP view. Bony pelvic ring is intact. Normal alignment pubic  symphysis and SI joints. Hips appear unremarkable. Small bone island in the  right iliac bone.     IMPRESSION  IMPRESSION: Exam unremarkable for age. Lumbar Spine 8/14/17     INDICATION:  Chronic low back pain.  History of colon cancer      AP and lateral views of the lumbar spine were obtained     FINDINGS: Lumbar vertebral body morphology, height, and alignment all within  normal limits. Mild disc space narrowing L4-L5 and L5-S1. No aggressive osseous  lesions.     IMPRESSION  IMPRESSION: Evidence of degenerative disc disease L4-L5 and L5-S1.    ASSESSMENT:    Problem List  Date Reviewed: 8/9/2017          Codes Class Noted    * (Principal)Intractable pain ICD-10-CM: R52  ICD-9-CM: 780.96  8/14/2017        Colovesical fistula ICD-10-CM: N32.1  ICD-9-CM: 596.1  7/24/2017        UTI (urinary tract infection) ICD-10-CM: N39.0  ICD-9-CM: 599.0  7/15/2017        Difficulty coping with disease ICD-10-CM: R45.89  ICD-9-CM: 799.29  2/23/2017        Sick ICD-10-CM: P98  ICD-9-CM: 799.9  2/21/2017        Fever and chills ICD-10-CM: R50.9  ICD-9-CM: 780.60  2/21/2017        Pain ICD-10-CM: R52  ICD-9-CM: 780.96  2/21/2017        Tachycardia ICD-10-CM: R00.0  ICD-9-CM: 785.0  11/15/2016        Hypokalemia ICD-10-CM: E87.6  ICD-9-CM: 276.8  11/15/2016        Anemia associated with chemotherapy ICD-10-CM: D64.81, T45.1X5A  ICD-9-CM: 285.3, E933.1  11/15/2016        [facility] ICD-10-CM: C18.9  ICD-9-CM: 153.9  9/26/2016        Lymphadenopathy ICD-10-CM: R59.1  ICD-9-CM: 785.6  9/9/2016                PLAN:  - Metastatic Colon Cancer  - s/p FOLFOX and Avastin, radiation tx. FOLFIRI/Avastin. Last treatment 6/28/17 (Cycle 3).    - Colovesical Fistula   08/16/17 Has follow up with Jez Peña tomorrow to discuss surgical plans.      - Pain (Lower back and left hip)  Dilaudid 1-2 mg IV Q 3 hours. Fentanyl 50 mcg patch too strong. Patient not wearing. 8/15/17 Add Fentanyl Patch 25 mcg Q 72 hours to pain regimen. 8/16/17 Pain controlled well but now with altered mental status. Did not tolerate Fentanyl on previous occasion. 8/17/17 Pain controlled. Discontinued Fentanyl patch, Dilaudid IV, and Flexeril due to AMS.   Modified Ativan 1 mg PO to Q 6h PRN and Dilaudid 4-6 mg PO PRN pain.     - Acute Febrile Illness  Blood cultures x 2, Zosyn IV  08/16/17 BC-NTD. Plan for Levaquin at discharge.      - Hypokalemia  - K+ 2.7. KCL 40 garima IV  8/16/17  K+ 3.3, KCL 40 meq IV.   8/17/17 K+ 2.8, KCL 40 meq IV. - Anemia  08/15/17 Hgb 6.8, Transfuse PRBCs X 2 units. 08/16/17 Hgb 8.9.     - Altered Mental Status  08/17/17 Canceled  discharge yesterday due to AMS. Stopped Fentanyl patch yesterday. Today stopped flexeril and IV pain meds. - consider MS Contin/OxyContin for better pain control. She obviously cannot tolerate Fentanyl. Changed Ativan every 6 hours prn (taking every 4 hours ATC at home). Hopefully, with these medication changes we will see an improvement in mental status and patient will be discharged home. Disposition:  Ms. Dixon Perez continues with AMS. Hopeful that changes in medications will improve her mental status. We will consult PT today. If AMS clears, patient to be discharged tomorrow. [facility]  [address] [place] [address]  Office : [phone]  Fax : [phone]         Attending Addendum:  I personally evaluated the patient with Taina Fowler N.P.,  and agree with the assessment, findings and plan as documented. We will adjust her pain meds again.               Donavan Sumner MD  [address] [place] [address]  [id number] [place] [address]  Office : [phone]  Fax : [phone]

## 2017-08-18 NOTE — PROGRESS NOTES
Morning meds given. Patient resting in bed, spouse at bedside.  questioning about potassium level and d/c instructions regarding meds. Reassured spouse d/c RN will review everything and will speak with BARRY Tubbs regarding potassium at home.  also requesting to push patient in wheelchair downstairs for her to get some fresh air. Potassium bolus infusing. Spoke with BARRY Tubbs.  States patient will be taking potassium at home

## 2017-08-18 NOTE — DISCHARGE INSTRUCTIONS
DISCHARGE SUMMARY from Nurse    The following personal items are in your possession at time of discharge:    Dental Appliances: Uppers, Lowers, With patient  Visual Aid: None     Home Medications: None  Jewelry: Necklace, With patient  Clothing: Pajamas, With patient  Other Valuables: Cell Phone             PATIENT INSTRUCTIONS:    After general anesthesia or intravenous sedation, for 24 hours or while taking prescription Narcotics:  · Limit your activities  · Do not drive and operate hazardous machinery  · Do not make important personal or business decisions  · Do  not drink alcoholic beverages  · If you have not urinated within 8 hours after discharge, please contact your surgeon on call. Report the following to your surgeon:  · Excessive pain, swelling, redness or odor of or around the surgical area  · Temperature over 100.5  · Nausea and vomiting lasting longer than 4 hours or if unable to take medications  · Any signs of decreased circulation or nerve impairment to extremity: change in color, persistent  numbness, tingling, coldness or increase pain  · Any questions        What to do at Home:  Recommended activity: Activity as tolerated. If you experience any of the following symptoms temp > 100.4, unrelieved pain, nausea or vomiting, shortness of breath or fatigue not relieved with rest, please follow up with MD.      *  Please give a list of your current medications to your Primary Care Provider. *  Please update this list whenever your medications are discontinued, doses are      changed, or new medications (including over-the-counter products) are added. *  Please carry medication information at all times in case of emergency situations. These are general instructions for a healthy lifestyle:    No smoking/ No tobacco products/ Avoid exposure to second hand smoke    Surgeon General's Warning:  Quitting smoking now greatly reduces serious risk to your health.     Obesity, smoking, and sedentary lifestyle greatly increases your risk for illness    A healthy diet, regular physical exercise & weight monitoring are important for maintaining a healthy lifestyle    You may be retaining fluid if you have a history of heart failure or if you experience any of the following symptoms:  Weight gain of 3 pounds or more overnight or 5 pounds in a week, increased swelling in our hands or feet or shortness of breath while lying flat in bed. Please call your doctor as soon as you notice any of these symptoms; do not wait until your next office visit. Recognize signs and symptoms of STROKE:    F-face looks uneven    A-arms unable to move or move unevenly    S-speech slurred or non-existent    T-time-call 911 as soon as signs and symptoms begin-DO NOT go       Back to bed or wait to see if you get better-TIME IS BRAIN. Warning Signs of HEART ATTACK     Call 911 if you have these symptoms:   Chest discomfort. Most heart attacks involve discomfort in the center of the chest that lasts more than a few minutes, or that goes away and comes back. It can feel like uncomfortable pressure, squeezing, fullness, or pain.  Discomfort in other areas of the upper body. Symptoms can include pain or discomfort in one or both arms, the back, neck, jaw, or stomach.  Shortness of breath with or without chest discomfort.  Other signs may include breaking out in a cold sweat, nausea, or lightheadedness. Don't wait more than five minutes to call 911 - MINUTES MATTER! Fast action can save your life. Calling 911 is almost always the fastest way to get lifesaving treatment. Emergency Medical Services staff can begin treatment when they arrive -- up to an hour sooner than if someone gets to the hospital by car. The discharge information has been reviewed with the patient. The patient verbalized understanding.     Discharge medications reviewed with the patient and appropriate educational materials and side effects teaching were provided. Back Pain: Care Instructions  Your Care Instructions    Back pain has many possible causes. It is often related to problems with muscles and ligaments of the back. It may also be related to problems with the nerves, discs, or bones of the back. Moving, lifting, standing, sitting, or sleeping in an awkward way can strain the back. Sometimes you don't notice the injury until later. Arthritis is another common cause of back pain. Although it may hurt a lot, back pain usually improves on its own within several weeks. Most people recover in 12 weeks or less. Using good home treatment and being careful not to stress your back can help you feel better sooner. Follow-up care is a key part of your treatment and safety. Be sure to make and go to all appointments, and call your doctor if you are having problems. Its also a good idea to know your test results and keep a list of the medicines you take. How can you care for yourself at home? · Sit or lie in positions that are most comfortable and reduce your pain. Try one of these positions when you lie down:  ¨ Lie on your back with your knees bent and supported by large pillows. ¨ Lie on the floor with your legs on the seat of a sofa or chair. Rebekah Reba on your side with your knees and hips bent and a pillow between your legs. ¨ Lie on your stomach if it does not make pain worse. · Do not sit up in bed, and avoid soft couches and twisted positions. Bed rest can help relieve pain at first, but it delays healing. Avoid bed rest after the first day of back pain. · Change positions every 30 minutes. If you must sit for long periods of time, take breaks from sitting. Get up and walk around, or lie in a comfortable position. · Try using a heating pad on a low or medium setting for 15 to 20 minutes every 2 or 3 hours. Try a warm shower in place of one session with the heating pad.   · You can also try an ice pack for 10 to 15 minutes every 2 to 3 hours. Put a thin cloth between the ice pack and your skin. · Take pain medicines exactly as directed. ¨ If the doctor gave you a prescription medicine for pain, take it as prescribed. ¨ If you are not taking a prescription pain medicine, ask your doctor if you can take an over-the-counter medicine. · Take short walks several times a day. You can start with 5 to 10 minutes, 3 or 4 times a day, and work up to longer walks. Walk on level surfaces and avoid hills and stairs until your back is better. · Return to work and other activities as soon as you can. Continued rest without activity is usually not good for your back. · To prevent future back pain, do exercises to stretch and strengthen your back and stomach. Learn how to use good posture, safe lifting techniques, and proper body mechanics. When should you call for help? Call your doctor now or seek immediate medical care if:  · You have new or worsening numbness in your legs. · You have new or worsening weakness in your legs. (This could make it hard to stand up.)  · You lose control of your bladder or bowels. Watch closely for changes in your health, and be sure to contact your doctor if:  · Your pain gets worse. · You are not getting better after 2 weeks. Where can you learn more? Go to http://jose-lynn.info/. Enter D780 in the search box to learn more about \"Back Pain: Care Instructions. \"  Current as of: March 21, 2017  Content Version: 11.3  © 8551-5743 [employer] Care instructions adapted under license by [employer] (which disclaims liability or warranty for this information). If you have questions about a medical condition or this instruction, always ask your healthcare professional. [place] [address] any warranty or liability for your use of this information.

## 2017-08-18 NOTE — PROGRESS NOTES
Discharge instructions and prescriptions provided and explained to patient, patient voiced understanding. Medication side effect sheet reviewed with pt. No home meds or valuables to return. Opportunity for questions provided. Pt to be discharged once IV potassium is completed. Instructed to call once ready to leave the floor.

## 2017-08-18 NOTE — DISCHARGE SUMMARY
[facility]: Inpatient Hematology / Oncology Discharge Summary Note    Patient ID:  Zoey Daniels  [id number]  64 y.o.  1960    Admit Date: 8/14/2017    Discharge Date: 8/18/2017    Admission Diagnoses: Intractable pain    Discharge Diagnoses:  Principal Diagnosis: Intractable pain  Principal Problem:    Intractable pain (8/14/2017)        Hospital Course:  Ms. Rosette Schmizt is a 68-year-old female with metastatic colon cancer status post radiation status post chemotherapy. Current therapy is FOLFIRI/Avastin. Last treatment on 6/28/17 (Cycle 3). We were recently consulted on 7/15 when she was admitted to the hospital for abdominal pain and CT showed the colovesical fistula. She presented to the emergency department on 8/14 with c/o low back pain and left hip pain that has worsened over the previous few days. She was not febrile initially, but was noted to be slightly tachycardic. On reevaluation a rectal temp was performed which was 101°F. Blood cultures were drawn and remained with no growth and Zosyn IV given. On 8/4 she was seen by Urology, Dr. Brooke Reed and had a cystoscopy that confirmed colovesical fistula that was seen on CT. She was referred to Dr. Linus Victor for surgical options. She was seen by Dr. Linus Victor on 8/9. He concluded that he did not see any non-surgical solution if she still plans to have more chemotherapy or immune therapy, but surgical repair would not be an easy task. She will most likely need a colostomy and possibly remove primary cancer if possible. Chemo would be delayed by 4 to 6 weeks. She has a f/u scheduled next week to discuss options with Dr. Linus Victor.  Pain controlled with oral dilaudid. Fentanyl patch DC'd due to intolerance/AMS. AMS resolved after medication adjustment. Remains afebrile. States she feels great and is ready to go home. Will give prescription for levaquin to continue on discharge.   Hypokalemia persists, will give bolus before discharge and increase oral potassium to 40mEq BID. She is scheduled to f/u with Dr. Joshua Bravo next week. Advised to call with fever/chills, uncontrollable symptoms, or with any other concerns. Pt and  verbalized understanding. Consults:  None    Pertinent Diagnostic Studies:   Labs:    Recent Labs      08/17/17   0305  08/16/17   0015   WBC  7.4   --    HGB  10.3*  8.9*   PLT  320   --    ANEU  6.1   --     Recent Labs      08/18/17   0747  08/17/17   0305   NA  138  140   K  2.9*  2.8*   CL  101  98   CO2  30  29   GLU  98  109*   BUN  6  7   CREA  0.47*  0.51*   CA  9.5  9.8   MG  1.9  1.9       Imaging:  XR PELV AP ONLY [[id number]] Collected: 08/14/17 1607      Order Status: Completed Updated: 08/14/17 1610     Narrative:       Pelvis 8/14/2017    HISTORY: Chronic pelvic pain, history of colon cancer    FINDINGS: Single AP view. Bony pelvic ring is intact. Normal alignment pubic  symphysis and SI joints. Hips appear unremarkable. Small bone island in the  right iliac bone.       Impression:       IMPRESSION: Exam unremarkable for age.   Eino Sinks SPINE LUMB 2 OR 3 V [[id number]] Collected: 08/14/17 1603     Order Status: Completed Updated: 08/14/17 1606     Narrative:       Lumbar Spine    INDICATION:  Chronic low back pain. History of colon cancer     AP and lateral views of the lumbar spine were obtained    FINDINGS: Lumbar vertebral body morphology, height, and alignment all within  normal limits. Mild disc space narrowing L4-L5 and L5-S1. No aggressive osseous  lesions.       Impression:       IMPRESSION: Evidence of degenerative disc disease L4-L5 and L5-S1. Current Discharge Medication List      START taking these medications    Details   potassium chloride (K-DUR, KLOR-CON) 20 mEq tablet Take 2 Tabs by mouth two (2) times a day. Qty: 120 Tab, Refills: 0      levoFLOXacin (LEVAQUIN) 500 mg tablet Take 1 Tab by mouth daily for 7 days.   Qty: 7 Tab, Refills: 0         CONTINUE these medications which have NOT CHANGED    Details   ALPRAZolam (XANAX) 2 mg tablet Take 2 mg by mouth nightly as needed for Anxiety. ondansetron hcl (ZOFRAN) 8 mg tablet Take 1 Tab by mouth every eight (8) hours as needed for Nausea. Qty: 90 Tab, Refills: 3      HYDROmorphone (DILAUDID) 4 mg tablet Take 1-2 Tabs by mouth every four (4) hours as needed for Pain. Max Daily Amount: 48 mg. Qty: 100 Tab, Refills: 0    Associated Diagnoses: Malignant neoplasm of sigmoid colon (HCC)      LORazepam (ATIVAN) 1 mg tablet Take 1 Tab by mouth every four (4) hours as needed for Anxiety. Max Daily Amount: 6 mg. Qty: 120 Tab, Refills: 0    Associated Diagnoses: Malignant neoplasm of colon, unspecified part of colon ([address] Vaginal bleeding      lidocaine-prilocaine (EMLA) topical cream Apply  to affected area as needed. Apply 1/2 inch cream to port site 90 minutes prior to access  Qty: 30 g, Refills: 0      prochlorperazine (COMPAZINE) 10 mg tablet Take 1 Tab by mouth every six (6) hours as needed. Qty: 120 Tab, Refills: 3      dronabinol (MARINOL) 5 mg capsule Take 1 Cap by mouth Before breakfast and dinner. Max Daily Amount: 10 mg. Indications: CANCER CHEMOTHERAPY-INDUCED NAUSEA AND VOMITING  Qty: 30 Cap, Refills: 0      PARoxetine (PAXIL) 40 mg tablet Take 1 Tab by mouth daily. Indications: GENERALIZED ANXIETY DISORDER, PANIC DISORDER  Qty: 30 Tab, Refills: 2    Associated Diagnoses: Malignant neoplasm of sigmoid colon (HCC)      cyclobenzaprine (FLEXERIL) 10 mg tablet Take 1 Tab by mouth three (3) times daily (with meals).   Qty: 30 Tab, Refills: 0         STOP taking these medications       fentaNYL (DURAGESIC) 50 mcg/hr PATCH Comments:   Reason for Stopping:         potassium chloride SR (MICRO-K) 10 mEq capsule Comments:   Reason for Stopping:         ondansetron (ZOFRAN ODT) 4 mg disintegrating tablet Comments:   Reason for Stopping:                 OBJECTIVE:  Patient Vitals for the past 8 hrs:   BP Temp Pulse Resp SpO2 Weight   08/18/17 0720 141/76 98 °F (36.7 °C) (!) 115 20 95 % -   17 0509 - - - - - 145 lb (65.8 kg)   17 0418 138/90 98.2 °F (36.8 °C) (!) 111 18 99 % -     Temp (24hrs), Av.3 °F (36.8 °C), Min:98 °F (36.7 °C), Max:98.8 °F (37.1 °C)         Physical Exam:  Constitutional: Well developed, well nourished female in no acute distress, lying comfortably in the hospital bed. HEENT: Normocephalic and atraumatic. Oropharynx is clear, mucous membranes are moist.  Extraocular muscles are intact. Sclerae anicteric. Neck supple without JVD. No thyromegaly present. Lymph node   No palpable submandibular, cervical, supraclavicular, axillary or inguinal lymph nodes. Skin Warm and dry. No bruising and no rash noted. No erythema. No pallor. Respiratory Lungs are clear to auscultation bilaterally without wheezes, rales or rhonchi, normal air exchange without accessory muscle use. CVS Tachycardic rate, regular rhythm and normal S1 and S2. No murmurs, gallops, or rubs. Abdomen Soft, nontender and nondistended, normoactive bowel sounds. No palpable mass. No hepatosplenomegaly. Neuro Grossly nonfocal with no obvious sensory or motor deficits. MSK Normal range of motion in general.  No edema and no tenderness. Psych Appropriate mood and affect. ASSESSMENT:    Principal Problem:    Intractable pain (2017)        DISPOSITION:  Follow-up Appointments   Procedures    FOLLOW UP VISIT Appointment in: Other (Specify) Follow up as scheduled with Dr. Oskar Fernandes with labs on      Follow up as scheduled with Dr. Oskar Fernandes with labs on      Standing Status:   Standing     Number of Occurrences:   1     Order Specific Question:   Appointment in     Answer: Other (Specify)           Over 30 minutes was spent in discharge planning and coordination of care.             Hunter Ramirez NP  [facility]  [address] [address]  Office : [phone]  Fax : [phone] Attending Addendum:  I personally evaluated the patient with Randy Watkins N.P.,  and agree with the assessment, findings and plan as documented. She is scheduled to follow-up in clinic next week.               Shantelle Faith MD  [address] [place] [address]  Office : [phone]  Fax : [phone]

## 2017-08-18 NOTE — PROGRESS NOTES
Pt for D/C home with spouse as planned today. DME ordered (BSC and W/C) has been delivered to pt room. No other supportive care needs expressed or orders received at this time.

## 2017-08-18 NOTE — ADT AUTH CERT NOTES
Utilization Review           Pain Management [facility] - Care Day 4 (8/17/2017) by Martha Nagel RN        Review Status Review Entered       Completed 8/18/2017       Details              Care Day: 4 Care Date: 8/17/2017 Level of Care: Inpatient Floor       Guideline Day 3        Level Of Care       ( ) * Activity level acceptable       ( ) * Complete discharge planning              Clinical Status       (X) * Pain adequately managed       8/18/2017 1:05 PM EDT by Evelio Allison         7/10              ( ) * Treatment side effects absent or acceptable (eg, adequate gastrointestinal motility)       8/18/2017 1:05 PM EDT by Evelio Allison         altered mental status.              (X) * Fracture or injury absent or status acceptable       8/18/2017 1:05 PM EDT by Evelio Allison         NONE              (X) * Vascular, soft tissue, and wound status acceptable       8/18/2017 1:05 PM EDT by Evelio Allison         WNL              ( ) * Neurologic status acceptable       8/18/2017 1:05 PM EDT by Evelio Allison         altered mental status.              ( ) * General Discharge Criteria met              Interventions       (X) * Intake acceptable       8/18/2017 1:05 PM EDT by Evelio Allison         360 CC PO              ( ) * No inpatient interventions needed       8/18/2017 1:05 PM EDT by Evelio Allison         /HR, MARINOL, PAXIL, ZOSYN 4.5 GMS IV Q 8, K DUR, KCL 40 MEQ IVPB, FLEXERIL, DILAUDID PO,                     8/18/2017 1:05 PM EDT by Evelio Allison       Subject: Additional Clinical Information              Afebrile/VSS. Pain controlled but now with hallucinations and altered mental status. Medication changes - Stopped Fentanyl 25 jose patch, stopped Dilaudid 1-2 mg IV, stopped Flexeril, modified Ativan 1 mg PO to Q 6 prn, modified Dilaudid PO to 4-6 mg.  Consult to PT    8/17/17 Pain controlled.   Discontinued Fentanyl patch, Dilaudid IV, and Flexeril due to AMS.    Modified Ativan 1 mg PO to Q 6h PRN and Dilaudid 4-6 mg PO PRN pain. 8/17/17 K+ 2.8, KCL 40 meq IV. Altered Mental Status  08/17/17 Canceled Moira Hare yesterday due to AMS. Stopped Fentanyl patch yesterday.   Today stopped flexeril and IV pain meds.   - consider MS Contin/OxyContin for better pain control. She obviously cannot tolerate Fentanyl.   Changed Ativan every 6 hours prn (taking every 4 hours ATC at home).   Hopefully, with these medication changes we will see an improvement in mental status and patient will be discharged home.      Disposition:   Ms. Sharee Leiva continues with AMS.    Hopeful that changes in medications will improve her mental status.   We will consult PT today.   If AMS clears, patient to be discharged tomorrow.    , /73, SAT 93% RA                                       * Milestone                  Pain Management [facility] - Care Day 3 (8/16/2017) by Renata Pelayo RN        Review Status Review Entered       Completed 8/18/2017       Details              Care Day: 3 Care Date: 8/16/2017 Level of Care: Inpatient Floor       Guideline Day 3        Level Of Care       ( ) * Activity level acceptable       ( ) * Complete discharge planning              Clinical Status       ( ) * Pain adequately managed       8/18/2017 12:59 PM EDT by Lela Handler         9/10              ( ) * Treatment side effects absent or acceptable (eg, adequate gastrointestinal motility)       8/18/2017 12:59 PM EDT by Lela Handler         hallucinations and altered mental status.              (X) * Fracture or injury absent or status acceptable       8/18/2017 12:59 PM EDT by Lela Handler         ABSENT              (X) * Vascular, soft tissue, and wound status acceptable       8/18/2017 12:59 PM EDT by Lela Handler         WNL              ( ) * Neurologic status acceptable       8/18/2017 12:59 PM EDT by Lela Handler         hallucinations and altered mental status.              ( ) * General Discharge Criteria met              Interventions       (X) * Intake acceptable       8/18/2017 12:59 PM EDT by Warren Acosta         240cc po              ( ) * No inpatient interventions needed       8/18/2017 12:59 PM EDT by Warren Acosta         /HR, MARINOL, DILAUDID IV AND PO, PAXIL, ZOSYN 4.5 GMS IV Q 8, K DUR, ATIVAN IV AND PO, FLEXERIL,                     8/18/2017 12:59 PM EDT by Warren Acosta       Subject: Additional Clinical Information       Reason for Admission/Hospital Course: Intractable pain          24 Hour Events:  Afebrile/VSS. Pain now well controlled but now with hallucinations and altered mental status. PLAN:  - Metastatic Colon Cancer  - s/p FOLFOX and Avastin, radiation tx.      FOLFIRI/Avastin.   Last treatment 6/28/17 (Cycle 3).       - Colovesical Fistula    08/16/17 Has follow up with Elizabeth Ramos tomorrow to discuss surgical plans.         - Pain (Lower back and left hip)  Dilaudid 1-2 mg IV Q 3 hours.   Fentanyl 50 mcg patch too strong. Patient not wearing. 8/15/17 Add Fentanyl Patch 25 mcg Q 72 hours to pain regimen. 8/16/17 Pain controlled well but now with altered mental status. Did not tolerate Fentanyl on previous occasion.         - Acute Febrile Illness  Blood cultures x 2, Zosyn IV  08/16/17 BC-NTD. Plan for Levaquin at discharge.         - Hypokalemia  - K+ 2.7. KCL 40 garima IV  8/16/17  K+ 3.3, KCL 40 meq IV.       - Anemia  08/15/17 Hgb 6.8, Transfuse PRBCs X 2 units. 08/16/17 Hgb 8. 9.       - Altered Mental Status  08/19/17 Cancel discharge. Stop Fentanyl patch - consider MS Contin/OxyContin in the morning for better pain control. She obviously cannot tolerate Fentanyl. Schedule Ativan every 6 hours (taking every 4 hours ATC at home).  , /76, SAT 98%  RA                                               * Milestone

## 2017-08-23 PROBLEM — C18.7 CANCER OF SIGMOID COLON (HCC): Status: ACTIVE | Noted: 2017-01-01

## 2017-08-23 NOTE — IP AVS SNAPSHOT
Summary of Care Report The Summary of Care report has been created to help improve care coordination. Users with access to [employer] or [address] (Web-based application) may access additional patient information including the Discharge Summary. If you are not currently a [facility] user and need more information, please call the number listed below in the [place] [address] section and ask to be connected with Medical Records. Facility Information Name Address Phone [address] [address] [address] [phone] Patient Information Patient Name Sex  Shareen Sandifer ([id number]) Female 1960 Discharge Information Admitting Provider Service Area Unit Burke Flores MD / Ashley 65 6 Med Surg / [phone] Discharge Provider Discharge Date/Time Discharge Disposition Destination (none) 2017 (Pending) [facility] (none) Patient Language Language ENGLISH [13] Hospital Problems as of 2017  Reviewed: 2017  7:25 AM by Arline Keys CRNA Class Noted - Resolved Last Modified POA Active Problems * (Principal)Cancer of sigmoid colon ([address]  2017 - Present 2017 by Nader Nuñez NP Yes Entered by Burke Flores MD  
  
Non-Hospital Problems as of 2017  Reviewed: 2017  7:25 AM by Arline Keys CRNA Class Noted - Resolved Last Modified Active Problems Lymphadenopathy  2016 - Present 11/15/2016 by Asuncion aMza NP   Entered by Burke Flores MD  
  Malignant neoplasm of colon [facility])  2016 - Present 7/15/2017 by Kriss Carlson MD  
  Entered by Jin Bravo MD  
  Tachycardia  11/15/2016 - Present 7/15/2017 by Kriss Carlson MD  
  Entered by Asuncion Maza NP  
 Hypokalemia  11/15/2016 - Present 11/18/2016 by Thomas Briones, NP Entered by Rosalia Dey, NP Anemia associated with chemotherapy  11/15/2016 - Present 11/18/2016 by Thomas Briones, NP Entered by Rosalia Dey, NP Sick  2/21/2017 - Present 2/22/2017 by Kee Rinne, NP Entered by Brent Win MD  
  Fever and chills  2/21/2017 - Present 2/22/2017 by Kee Rinne, NP Entered by Kee Rinne, NP Pain  2/21/2017 - Present 2/22/2017 by Kee Rinne, NP Entered by Kee Rinne, NP Difficulty coping with disease  2/23/2017 - Present 2/23/2017 by Marii Shane., PsyD Entered by Marii Shane., PsyD UTI (urinary tract infection)  7/15/2017 - Present 7/15/2017 by Nicky Samson MD  
  Entered by Nicky Samson MD  
  Colovesical fistula  7/24/2017 - Present 7/24/2017 by Sly Blas MD  
  Entered by Sly Blas MD  
  Intractable pain  8/14/2017 - Present 8/15/2017 by Hardik Smith  
  Entered by [address] are allergic to the following Allergen Reactions Penicillin G Unknown (comments) Daughter states patient has Pcn allergy Tolerates Zosyn - Mikey Cevallos PharmD Current Discharge Medication List  
  
START taking these medications Dose & Instructions Dispensing Information Comments  
 docusate sodium 100 mg capsule Commonly known as:  Eimarine Wilson Dose:  100 mg Take 1 Cap by mouth two (2) times a day. Hold for liquid stool Quantity:  60 Cap Refills:  0  
   
 enoxaparin 40 mg/0.4 mL Commonly known as:  LOVENOX Dose:  40 mg  
0.4 mL by SubCUTAneous route every twenty-four (24) hours for 21 days. Quantity:  8.4 mL Refills:  0  
   
 magnesium hydroxide 400 mg/5 mL suspension Commonly known as:  MILK OF MAGNESIA Dose:  30 mL Take 30 mL by mouth two (2) times a day for 30 days. Hold for liquid stool. Quantity:  1800 mL Refills:  0 metoprolol tartrate 50 mg tablet Commonly known as:  LOPRESSOR Dose:  50 mg Take 1 Tab by mouth every twelve (12) hours. Quantity:  60 Tab Refills:  0  
   
 oxyCODONE ER 20 mg ER tablet Commonly known as:  OxyCONTIN Dose:  20 mg Take 1 Tab by mouth every twelve (12) hours. Max Daily Amount: 40 mg.  
 Quantity:  30 Tab Refills:  0  
   
 oxyCODONE-acetaminophen  mg per tablet Commonly known as:  PERCOCET 10 Dose:  1 Tab Take 1 Tab by mouth every four (4) hours as needed. Max Daily Amount: 6 Tabs. Quantity:  40 Tab Refills:  0  
   
 pantoprazole 40 mg tablet Commonly known as:  PROTONIX Dose:  40 mg Take 1 Tab by mouth Daily (before breakfast). Quantity:  30 Tab Refills:  0 CONTINUE these medications which have CHANGED Dose & Instructions Dispensing Information Comments  
 levoFLOXacin 750 mg tablet Commonly known as:  Candy Bolanos What changed:   
- medication strength 
- how much to take - when to take this Dose:  750 mg Take 1 Tab by mouth every twenty-four (24) hours for 5 days. Quantity:  5 Tab Refills:  0 PARoxetine 40 mg tablet Commonly known as:  PAXIL What changed:  when to take this Dose:  40 mg Take 1 Tab by mouth daily. Indications: GENERALIZED ANXIETY DISORDER, PANIC DISORDER Quantity:  30 Tab Refills:  2 CONTINUE these medications which have NOT CHANGED Dose & Instructions Dispensing Information Comments  
 cyclobenzaprine 10 mg tablet Commonly known as:  FLEXERIL Dose:  10 mg Take 1 Tab by mouth three (3) times daily (with meals). Quantity:  30 Tab Refills:  0  
   
 lidocaine-prilocaine topical cream  
Commonly known as:  EMLA Apply  to affected area as needed. Apply 1/2 inch cream to port site 90 minutes prior to access Quantity:  30 g Refills:  0 LORazepam 1 mg tablet Commonly known as:  ATIVAN Dose:  1 mg Take 1 Tab by mouth every four (4) hours as needed for Anxiety. Max Daily Amount: 6 mg. Quantity:  120 Tab Refills:  0  
   
 ondansetron hcl 8 mg tablet Commonly known as:  Nolia Folk Dose:  8 mg Take 1 Tab by mouth every eight (8) hours as needed for Nausea. Quantity:  90 Tab Refills:  3  
   
 potassium chloride 20 mEq tablet Commonly known as:  K-DUR, KLOR-CON Dose:  40 mEq Take 2 Tabs by mouth two (2) times a day. Quantity:  120 Tab Refills:  0  
   
 prochlorperazine 10 mg tablet Commonly known as:  COMPAZINE Dose:  10 mg Take 1 Tab by mouth every six (6) hours as needed. Quantity:  120 Tab Refills:  3 XANAX 2 mg tablet Generic drug:  ALPRAZolam  
 Dose:  2 mg Take 2 mg by mouth nightly as needed for Anxiety. Refills:  0 STOP taking these medications Comments HYDROmorphone 4 mg tablet Commonly known as:  DILAUDID Current Immunizations Name Date Influenza Vaccine Lonnell Mays) 11/12/2015 Surgery Information ID Date/Time Status Primary Surgeon All Procedures Location [id number] 8/23/2017  7:30 AM Posted Easton Clemons MD COLOSTOMY 
LAPAROTOMY EXPLORATORY/ [address] / collectomy. abdominal wall resection/ partial hysterectomy/ partial cystectomy   [facility] OR Follow-up Information Follow up With Details Comments Contact Info [address] [address] [address] [facility] [address] 
[phone] Lala Kinsey DO   [address] [place]  
[address] [facility] [address] 
[phone] Easton Clemons MD  one week, patient will need to schedule this for Tuesday, office closed [address] [place] [address] [facility] [address] [phone] [address]  As needed [address] [place] [address] 
Cynthia Cummings [phone] Discharge Instructions DISCHARGE SUMMARY from Nurse The following personal items are in your possession at time of discharge: 
 
Dental Appliances: None Visual Aid: None Home Medications: None Jewelry: None Clothing: Pants, Shirt, Undergarments, Footwear Other Valuables: None Personal Items Sent to Safe: none PATIENT INSTRUCTIONS: 
 
After general anesthesia or intravenous sedation, for 24 hours or while taking prescription Narcotics: · Limit your activities · Do not drive and operate hazardous machinery · Do not make important personal or business decisions · Do  not drink alcoholic beverages · If you have not urinated within 8 hours after discharge, please contact your surgeon on call. Report the following to your surgeon: 
· Excessive pain, swelling, redness or odor of or around the surgical area · Temperature over 100.5 · Nausea and vomiting lasting longer than 4 hours or if unable to take medications · Any signs of decreased circulation or nerve impairment to extremity: change in color, persistent  numbness, tingling, coldness or increase pain · Any questions What to do at Home: 
Recommended activity: No lifting, Driving, or Strenuous exercise until cleared by Dr. Loco Ceballos 
 
If you experience any of the following symptoms increased pain, fever greater than 101, nausea/vomiting, increased wound drainage, or decreased colostomy output, please follow up with Dr. Loco Ceballos. 
 
 
*  Please give a list of your current medications to your Primary Care Provider. *  Please update this list whenever your medications are discontinued, doses are 
    changed, or new medications (including over-the-counter products) are added. *  Please carry medication information at all times in case of emergency situations. These are general instructions for a healthy lifestyle: No smoking/ No tobacco products/ Avoid exposure to second hand smoke Surgeon General's Warning:  Quitting smoking now greatly reduces serious risk to your health. Obesity, smoking, and sedentary lifestyle greatly increases your risk for illness A healthy diet, regular physical exercise & weight monitoring are important for maintaining a healthy lifestyle You may be retaining fluid if you have a history of heart failure or if you experience any of the following symptoms:  Weight gain of 3 pounds or more overnight or 5 pounds in a week, increased swelling in our hands or feet or shortness of breath while lying flat in bed. Please call your doctor as soon as you notice any of these symptoms; do not wait until your next office visit. Recognize signs and symptoms of STROKE: 
 
F-face looks uneven A-arms unable to move or move unevenly S-speech slurred or non-existent T-time-call 911 as soon as signs and symptoms begin-DO NOT go Back to bed or wait to see if you get better-TIME IS BRAIN. Warning Signs of HEART ATTACK Call 911 if you have these symptoms: 
? Chest discomfort. Most heart attacks involve discomfort in the center of the chest that lasts more than a few minutes, or that goes away and comes back. It can feel like uncomfortable pressure, squeezing, fullness, or pain. ? Discomfort in other areas of the upper body. Symptoms can include pain or discomfort in one or both arms, the back, neck, jaw, or stomach. ? Shortness of breath with or without chest discomfort. ? Other signs may include breaking out in a cold sweat, nausea, or lightheadedness. Don't wait more than five minutes to call [address]! Fast action can save your life. Calling 911 is almost always the fastest way to get lifesaving treatment. Emergency Medical Services staff can begin treatment when they arrive  up to an hour sooner than if someone gets to the hospital by car. The discharge information has been reviewed with the patient. The patient verbalized understanding.  
 
Discharge medications reviewed with the patient and appropriate educational materials and side effects teaching were provided. MD Instructions: Follow-up with Dr. Jez Peña in 1 week -- you will need to call tomorrow and schedule an appointment. Your may may be removed on Saturday -- 9/2/17 Keep incision/staples clean and dry, may remain uncovered. Do not apply lotions, creams or ointments to incisions/flor. Juve Lazaro will be removed at follow-up appointment. Diet - as tolerated - Soft foods diet. Activity - ambulate - as tolerated - no heavy lifting >10lb. May shower - no tub baths or soaking, submerging or swimming. No driving while taking narcotics. Do not drink alcohol while taking narcotics. Resume other home medications as directed. You will need to make an appointment to see Palliative Care or Oncology for pain management. If problems or questions arise, please call our office at [phone]. Open Bowel Resection: What to Expect at Home Your Recovery You are likely to have pain that comes and goes for the next few days after bowel surgery. You may have bowel cramps, and your cut (incision) may hurt. You may also feel like you have the flu. You may have a low fever and feel tired and nauseated. This is common. You should feel better after a week and will probably be back to normal in 2 to 3 weeks. This care sheet gives you a general idea about how long it will take for you to recover. But each person recovers at a different pace. Follow the steps below to get better as quickly as possible. How can you care for yourself at home? Activity · Rest when you feel tired. Getting enough sleep will help you recover. · Try to walk each day. Start by walking a little more than you did the day before. Bit by bit, increase the amount you walk. Walking boosts blood flow and helps prevent pneumonia and constipation.  
· Avoid strenuous activities, such as biking, jogging, weight lifting, or aerobic exercise, until your doctor says it is okay. · Ask your doctor when you can drive again. · You will probably need to take 3 to 4 weeks off from work. It depends on the type of work you do and how you feel. You may need to take off 4 to 6 weeks if you lift heavy objects in your job. · You may shower 24 to 48 hours after surgery, if your doctor says it is okay. Pat the cut (incision) dry. Do not take a bath for the first 2 weeks, or until your doctor tells you it is okay. · Ask your doctor when it is okay for you to have sex. Diet · You may not have much appetite after the surgery. But try to eat a healthy diet. Your doctor will tell you about any foods you should not eat. · Eat a low-fiber diet for several weeks after surgery. Eat many small meals throughout the day. Add high-fiber foods a little at a time. · Eat yogurt. It puts good bacteria into your colon and helps prevent diarrhea. · Try to avoid nuts, seeds, and corn for a while. They may be hard to digest. 
· You may need to take vitamins that contain sodium and potassium. Ask your doctor. · Drink plenty of fluids to avoid becoming dehydrated. Medicines · Your doctor will tell you if and when you can restart your medicines. He or she will also give you instructions about taking any new medicines. · If you take blood thinners, such as warfarin (Coumadin), clopidogrel (Plavix), or aspirin, be sure to talk to your doctor. He or she will tell you if and when to start taking those medicines again. Make sure that you understand exactly what your doctor wants you to do. · Take pain medicines exactly as directed. ¨ If the doctor gave you a prescription medicine for pain, take it as prescribed. ¨ If you are not taking a prescription pain medicine, ask your doctor if you can take an over-the-counter medicine. ¨ Do not take two or more pain medicines at the same time unless the doctor told you to.  Many pain medicines have acetaminophen, which is Tylenol. Too much acetaminophen (Tylenol) can be harmful. · If you think your pain medicine is making you sick to your stomach: 
¨ Take your medicine after meals (unless your doctor tells you not to). ¨ Ask your doctor for a different pain medicine. · If your doctor prescribed antibiotics, take them as directed. Do not stop taking them just because you feel better. You need to take the full course of antibiotics. · You may need to take some medicines in a different form. You will be told whether to crush pills or take a liquid form of the medicine. · If your doctor gives you a stool softener, take it as directed. Incision care · If you have strips of tape on the incision, leave the tape on for a week or until it falls off. · Wash the area daily with warm, soapy water, and pat it dry. Follow-up care is a key part of your treatment and safety. Be sure to make and go to all appointments, and call your doctor if you are having problems. It's also a good idea to know your test results and keep a list of the medicines you take. When should you call for help? Call 911 anytime you think you may need emergency care. For example, call if: 
· You passed out (lost consciousness). · You have sudden chest pain and shortness of breath, or you cough up blood. · You have severe pain in your belly. Call your doctor now or seek immediate medical care if: 
· You are sick to your stomach and cannot drink fluids or keep them down. · You have signs of a blood clot, such as: 
¨ Pain in your calf, back of the knee, thigh, or groin. ¨ Redness and swelling in your leg or groin. · You have a lot of diarrhea that smells very bad. · You have trouble passing urine or stool, especially if you have mild pain or swelling in your lower belly. · You have signs of infection, such as: 
¨ Increased pain, swelling, warmth, or redness. ¨ Red streaks leading from the incision. ¨ Pus draining from the incision. ¨ A fever. · You have pain that does not get better after you take pain medicine. · You have loose stitches, or your incision comes open. · You are bleeding or have new drainage from the incision. Watch closely for any changes in your health, and be sure to contact your doctor if: 
· You do not have a bowel movement after taking a laxative. · You do not get better as expected. Where can you learn more? Go to http://jose-lynn.info/. Enter 386 8280 in the search box to learn more about \"Open Bowel Resection: What to Expect at Home. \" Current as of: August 9, 2016 Content Version: 11.3 © 6988-3020 [employer]. Care instructions adapted under license by [employer] (which disclaims liability or warranty for this information). If you have questions about a medical condition or this instruction, always ask your healthcare professional. [address] [address] any warranty or liability for your use of this information. Chart Review Routing History Recipient Method Report Sent By StoneKailey Shepherd MD  
Phone: [phone] In [facility] Routed MD Stephany Medina 2 9/9/2016  6:53 PM 09/09/2016 Anthony Shepherd MD  
Phone: [phone] In [facility] RouteMD Stephany Ashton 2 9/23/2016  8:33 AM 09/23/2016 Abi Blanc RN Phone: [phone] In [facility], Mariangel CALDERON MD [[id number]] 9/26/2016 10:18 PM   
 Anthony Shepherd MD  
Phone: [phone] In [facility] MD Stephany Coronado 2 8/23/2017  8:43 PM 08/23/2017

## 2017-08-23 NOTE — ANESTHESIA POSTPROCEDURE EVALUATION
Post-Anesthesia Evaluation and Assessment    Patient: Vicente Jimenez MRN: [id number]  SSN: [id number]    YOB: 1960  Age: 64 y.o. Sex: female       Cardiovascular Function/Vital Signs  Visit Vitals    BP (P) 131/75    Pulse (!) (P) 111    Temp 37.3 °C (99.1 °F)    Resp (P) 18    Ht 5' 5\" (1.651 m)    Wt 71.7 kg (158 lb 2 oz)    SpO2 (P) 100%    BMI 26.31 kg/m2       Patient is status post general anesthesia for Procedure(s):  [place] / collectomy. abdominal wall resection/ partial hysterectomy/ partial cystectomy  . Nausea/Vomiting: None    Postoperative hydration reviewed and adequate. Pain:  Pain Scale 1: Numeric (0 - 10) (08/23/17 8293)  Pain Intensity 1: 7 (08/23/17 5846)   Managed    Neurological Status:   Neuro (WDL): Within Defined Limits (08/23/17 1220)   At baseline    Mental Status and Level of Consciousness: Arousable    Pulmonary Status:   O2 Device: Nasal cannula (08/23/17 1220)   Adequate oxygenation and airway patent    Complications related to anesthesia: None    Post-anesthesia assessment completed.  No concerns    Signed By: Juan Castro MD     August 23, 2017

## 2017-08-23 NOTE — IP AVS SNAPSHOT
Leydi Olivera 
 
 
 [address] [facility] 
[phone] Patient: Norbert Fleming MRN: [id number] :1960 You are allergic to the following Allergen Reactions Penicillin G Unknown (comments) Daughter states patient has Pcn allergy Tolerates Zosyn - Ed Esthela Cevallos Recent Documentation Height Weight BMI OB Status Smoking Status 1.651 m 71.7 kg 26.31 kg/m2 Menopause Former Smoker Emergency Contacts Name Discharge Info Relation Home Work Mobile Jyoti Howard DISCHARGE CAREGIVER [3] Daughter [21] [phone] MaurerGorge  Spouse [3] [phone] Shakira Abreu  Sister [23] [phone] About your hospitalization You were admitted on:  2017 You last received care in the:  [facility] 6 MED SURG You were discharged on:  2017 Unit phone number:  [phone] Why you were hospitalized Your primary diagnosis was:  Cancer Of Sigmoid Colon (Hcc) Providers Seen During Your Hospitalizations Provider Role Specialty Primary office phone Oscar Mendez MD Attending Provider General Surgery [phone] Your Primary Care Physician (PCP) Primary Care Physician Office Phone Office Fax Catha Hidden [phone] Follow-up Information Follow up With Details Comments Contact Info 7719  [address] [facility] [address] [phone] Redd Galvin DO   [address]  
[address] [address] 
[phone] Oscar Mendez MD  one week, patient will need to schedule this for Tuesday, office closed [address] [address] [phone] [address]  As needed [address] [facility] 
Cynthia Roger Cummings [phone] Current Discharge Medication List  
  
 START taking these medications Dose & Instructions Dispensing Information Comments Morning Noon Evening Bedtime  
 docusate sodium 100 mg capsule Commonly known as:  Chester Raker Your next dose is:  8/31 Dose:  100 mg Take 1 Cap by mouth two (2) times a day. Hold for liquid stool Quantity:  60 Cap Refills:  0  
     
   
   
   
  
 enoxaparin 40 mg/0.4 mL Commonly known as:  LOVENOX Your next dose is:  8/31 Dose:  40 mg  
0.4 mL by SubCUTAneous route every twenty-four (24) hours for 21 days. Quantity:  8.4 mL Refills:  0  
     
   
   
   
  
 magnesium hydroxide 400 mg/5 mL suspension Commonly known as:  MILK OF MAGNESIA Your next dose is:  As needed Dose:  30 mL Take 30 mL by mouth two (2) times a day for 30 days. Hold for liquid stool. Quantity:  1800 mL Refills:  0  
     
   
   
   
  
 metoprolol tartrate 50 mg tablet Commonly known as:  LOPRESSOR Your next dose is:  bedtime Dose:  50 mg Take 1 Tab by mouth every twelve (12) hours. Quantity:  60 Tab Refills:  0  
     
   
   
   
  
  
 oxyCODONE ER 20 mg ER tablet Commonly known as:  OxyCONTIN Your next dose is:  bedtime Dose:  20 mg Take 1 Tab by mouth every twelve (12) hours. Max Daily Amount: 40 mg.  
 Quantity:  30 Tab Refills:  0  
     
   
   
   
  
  
 oxyCODONE-acetaminophen  mg per tablet Commonly known as:  PERCOCET 10 Your next dose is:  As needed Dose:  1 Tab Take 1 Tab by mouth every four (4) hours as needed. Max Daily Amount: 6 Tabs. Quantity:  40 Tab Refills:  0  
     
   
   
   
  
 pantoprazole 40 mg tablet Commonly known as:  PROTONIX Your next dose is:  8/31 Dose:  40 mg Take 1 Tab by mouth Daily (before breakfast). Quantity:  30 Tab Refills:  0 CONTINUE these medications which have CHANGED Dose & Instructions Dispensing Information Comments Morning Noon Evening Bedtime  
 levoFLOXacin 750 mg tablet Commonly known as:  Alcides Hensley What changed:   
- medication strength 
- how much to take - when to take this Your next dose is:  8/31 everyday with supper Dose:  750 mg Take 1 Tab by mouth every twenty-four (24) hours for 5 days. Quantity:  5 Tab Refills:  0 PARoxetine 40 mg tablet Commonly known as:  PAXIL What changed:  when to take this Your next dose is:  8/31 Dose:  40 mg Take 1 Tab by mouth daily. Indications: GENERALIZED ANXIETY DISORDER, PANIC DISORDER Quantity:  30 Tab Refills:  2 CONTINUE these medications which have NOT CHANGED Dose & Instructions Dispensing Information Comments Morning Noon Evening Bedtime  
 cyclobenzaprine 10 mg tablet Commonly known as:  FLEXERIL Your next dose is:  8/31 Dose:  10 mg Take 1 Tab by mouth three (3) times daily (with meals). Quantity:  30 Tab Refills:  0  
     
   
   
   
  
 lidocaine-prilocaine topical cream  
Commonly known as:  EMLA Apply  to affected area as needed. Apply 1/2 inch cream to port site 90 minutes prior to access Quantity:  30 g Refills:  0 LORazepam 1 mg tablet Commonly known as:  ATIVAN Your next dose is:  As needed Dose:  1 mg Take 1 Tab by mouth every four (4) hours as needed for Anxiety. Max Daily Amount: 6 mg. Quantity:  120 Tab Refills:  0  
     
   
   
   
  
 ondansetron hcl 8 mg tablet Commonly known as:  Leif Mighty Your next dose is:  As needed Dose:  8 mg Take 1 Tab by mouth every eight (8) hours as needed for Nausea. Quantity:  90 Tab Refills:  3  
     
   
   
   
  
 potassium chloride 20 mEq tablet Commonly known as:  K-DUR, KLOR-CON Your next dose is:  8/31 Dose:  40 mEq Take 2 Tabs by mouth two (2) times a day. Quantity:  120 Tab Refills:  0 prochlorperazine 10 mg tablet Commonly known as:  COMPAZINE Your next dose is:  As needed Dose:  10 mg Take 1 Tab by mouth every six (6) hours as needed. Quantity:  120 Tab Refills:  3 XANAX 2 mg tablet Generic drug:  ALPRAZolam  
Your next dose is:  As needed Dose:  2 mg Take 2 mg by mouth nightly as needed for Anxiety. Refills:  0 STOP taking these medications HYDROmorphone 4 mg tablet Commonly known as:  DILAUDID Where to Get Your Medications Information on where to get these meds will be given to you by the nurse or doctor. ! Ask your nurse or doctor about these medications  
  docusate sodium 100 mg capsule  
 enoxaparin 40 mg/0.4 mL  
 levoFLOXacin 750 mg tablet  
 magnesium hydroxide 400 mg/5 mL suspension  
 metoprolol tartrate 50 mg tablet  
 oxyCODONE ER 20 mg ER tablet  
 oxyCODONE-acetaminophen  mg per tablet  
 pantoprazole 40 mg tablet Discharge Instructions DISCHARGE SUMMARY from Nurse The following personal items are in your possession at time of discharge: 
 
Dental Appliances: None Visual Aid: None Home Medications: None Jewelry: None Clothing: Pants, Shirt, Undergarments, Footwear Other Valuables: None Personal Items Sent to Safe: none PATIENT INSTRUCTIONS: 
 
After general anesthesia or intravenous sedation, for 24 hours or while taking prescription Narcotics: · Limit your activities · Do not drive and operate hazardous machinery · Do not make important personal or business decisions · Do  not drink alcoholic beverages · If you have not urinated within 8 hours after discharge, please contact your surgeon on call. Report the following to your surgeon: 
· Excessive pain, swelling, redness or odor of or around the surgical area · Temperature over 100.5 · Nausea and vomiting lasting longer than 4 hours or if unable to take medications · Any signs of decreased circulation or nerve impairment to extremity: change in color, persistent  numbness, tingling, coldness or increase pain · Any questions What to do at Home: 
Recommended activity: No lifting, Driving, or Strenuous exercise until cleared by Dr. Ivan Velasquez 
 
If you experience any of the following symptoms increased pain, fever greater than 101, nausea/vomiting, increased wound drainage, or decreased colostomy output, please follow up with Dr. Ivan Velasquez. 
 
 
*  Please give a list of your current medications to your Primary Care Provider. *  Please update this list whenever your medications are discontinued, doses are 
    changed, or new medications (including over-the-counter products) are added. *  Please carry medication information at all times in case of emergency situations. These are general instructions for a healthy lifestyle: No smoking/ No tobacco products/ Avoid exposure to second hand smoke Surgeon General's Warning:  Quitting smoking now greatly reduces serious risk to your health. Obesity, smoking, and sedentary lifestyle greatly increases your risk for illness A healthy diet, regular physical exercise & weight monitoring are important for maintaining a healthy lifestyle You may be retaining fluid if you have a history of heart failure or if you experience any of the following symptoms:  Weight gain of 3 pounds or more overnight or 5 pounds in a week, increased swelling in our hands or feet or shortness of breath while lying flat in bed. Please call your doctor as soon as you notice any of these symptoms; do not wait until your next office visit. Recognize signs and symptoms of STROKE: 
 
F-face looks uneven A-arms unable to move or move unevenly S-speech slurred or non-existent T-time-call 911 as soon as signs and symptoms begin-DO NOT go Back to bed or wait to see if you get better-TIME IS BRAIN. Warning Signs of HEART ATTACK Call 911 if you have these symptoms: 
? Chest discomfort. Most heart attacks involve discomfort in the center of the chest that lasts more than a few minutes, or that goes away and comes back. It can feel like uncomfortable pressure, squeezing, fullness, or pain. ? Discomfort in other areas of the upper body. Symptoms can include pain or discomfort in one or both arms, the back, neck, jaw, or stomach. ? Shortness of breath with or without chest discomfort. ? Other signs may include breaking out in a cold sweat, nausea, or lightheadedness. Don't wait more than five minutes to call [address]! Fast action can save your life. Calling 911 is almost always the fastest way to get lifesaving treatment. Emergency Medical Services staff can begin treatment when they arrive  up to an hour sooner than if someone gets to the hospital by car. The discharge information has been reviewed with the patient. The patient verbalized understanding. Discharge medications reviewed with the patient and appropriate educational materials and side effects teaching were provided. MD Instructions: Follow-up with Dr. Vaughn Castro in 1 week -- you will need to call tomorrow and schedule an appointment. Your may may be removed on Saturday -- 9/2/17 Keep incision/staples clean and dry, may remain uncovered. Do not apply lotions, creams or ointments to incisions/flor. Prudence Gouty will be removed at follow-up appointment. Diet - as tolerated - Soft foods diet. Activity - ambulate - as tolerated - no heavy lifting >10lb. May shower - no tub baths or soaking, submerging or swimming. No driving while taking narcotics. Do not drink alcohol while taking narcotics. Resume other home medications as directed. You will need to make an appointment to see Palliative Care or Oncology for pain management. If problems or questions arise, please call our office at [phone]. Open Bowel Resection: What to Expect at Home Your Recovery You are likely to have pain that comes and goes for the next few days after bowel surgery. You may have bowel cramps, and your cut (incision) may hurt. You may also feel like you have the flu. You may have a low fever and feel tired and nauseated. This is common. You should feel better after a week and will probably be back to normal in 2 to 3 weeks. This care sheet gives you a general idea about how long it will take for you to recover. But each person recovers at a different pace. Follow the steps below to get better as quickly as possible. How can you care for yourself at home? Activity · Rest when you feel tired. Getting enough sleep will help you recover. · Try to walk each day. Start by walking a little more than you did the day before. Bit by bit, increase the amount you walk. Walking boosts blood flow and helps prevent pneumonia and constipation. · Avoid strenuous activities, such as biking, jogging, weight lifting, or aerobic exercise, until your doctor says it is okay. · Ask your doctor when you can drive again. · You will probably need to take 3 to 4 weeks off from work. It depends on the type of work you do and how you feel. You may need to take off 4 to 6 weeks if you lift heavy objects in your job. · You may shower 24 to 48 hours after surgery, if your doctor says it is okay. Pat the cut (incision) dry. Do not take a bath for the first 2 weeks, or until your doctor tells you it is okay. · Ask your doctor when it is okay for you to have sex. Diet · You may not have much appetite after the surgery. But try to eat a healthy diet. Your doctor will tell you about any foods you should not eat. · Eat a low-fiber diet for several weeks after surgery. Eat many small meals throughout the day. Add high-fiber foods a little at a time. · Eat yogurt. It puts good bacteria into your colon and helps prevent diarrhea. · Try to avoid nuts, seeds, and corn for a while. They may be hard to digest. 
· You may need to take vitamins that contain sodium and potassium. Ask your doctor. · Drink plenty of fluids to avoid becoming dehydrated. Medicines · Your doctor will tell you if and when you can restart your medicines. He or she will also give you instructions about taking any new medicines. · If you take blood thinners, such as warfarin (Coumadin), clopidogrel (Plavix), or aspirin, be sure to talk to your doctor. He or she will tell you if and when to start taking those medicines again. Make sure that you understand exactly what your doctor wants you to do. · Take pain medicines exactly as directed. ¨ If the doctor gave you a prescription medicine for pain, take it as prescribed. ¨ If you are not taking a prescription pain medicine, ask your doctor if you can take an over-the-counter medicine. ¨ Do not take two or more pain medicines at the same time unless the doctor told you to. Many pain medicines have acetaminophen, which is Tylenol. Too much acetaminophen (Tylenol) can be harmful. · If you think your pain medicine is making you sick to your stomach: 
¨ Take your medicine after meals (unless your doctor tells you not to). ¨ Ask your doctor for a different pain medicine. · If your doctor prescribed antibiotics, take them as directed. Do not stop taking them just because you feel better. You need to take the full course of antibiotics. · You may need to take some medicines in a different form. You will be told whether to crush pills or take a liquid form of the medicine. · If your doctor gives you a stool softener, take it as directed. Incision care · If you have strips of tape on the incision, leave the tape on for a week or until it falls off. · Wash the area daily with warm, soapy water, and pat it dry. Follow-up care is a key part of your treatment and safety. Be sure to make and go to all appointments, and call your doctor if you are having problems. It's also a good idea to know your test results and keep a list of the medicines you take. When should you call for help? Call 911 anytime you think you may need emergency care. For example, call if: 
· You passed out (lost consciousness). · You have sudden chest pain and shortness of breath, or you cough up blood. · You have severe pain in your belly. Call your doctor now or seek immediate medical care if: 
· You are sick to your stomach and cannot drink fluids or keep them down. · You have signs of a blood clot, such as: 
¨ Pain in your calf, back of the knee, thigh, or groin. ¨ Redness and swelling in your leg or groin. · You have a lot of diarrhea that smells very bad. · You have trouble passing urine or stool, especially if you have mild pain or swelling in your lower belly. · You have signs of infection, such as: 
¨ Increased pain, swelling, warmth, or redness. ¨ Red streaks leading from the incision. ¨ Pus draining from the incision. ¨ A fever. · You have pain that does not get better after you take pain medicine. · You have loose stitches, or your incision comes open. · You are bleeding or have new drainage from the incision. Watch closely for any changes in your health, and be sure to contact your doctor if: 
· You do not have a bowel movement after taking a laxative. · You do not get better as expected. Where can you learn more? Go to http://jose-lynn.info/. Enter 103 [id number] in the search box to learn more about \"Open Bowel Resection: What to Expect at Home. \" Current as of: August 9, 2016 Content Version: 11.3 © 3601-7159 [employer].  Care instructions adapted under license by [employer] (which disclaims liability or warranty for this information). If you have questions about a medical condition or this instruction, always ask your healthcare professional. [address] any warranty or liability for your use of this information. Discharge Orders None [facility] Announcement We are excited to announce that we are making your provider's discharge notes available to you in [employer] [facility]. You will see these notes when they are completed and signed by the physician that discharged you from your recent hospital stay. If you have any questions or concerns about any information you see in [employer] [facility], please call the Health Information Department where you were seen or reach out to your Primary Care Provider for more information about your plan of care. Introducing hospitals & HEALTH SERVICES! Dear Yakov: 
Thank you for requesting a [employer] [facility] account. Our records indicate that you already have an active [employer] [facility] account. You can access your account anytime at [email or web address] Did you know that you can access your hospital and ER discharge instructions at any time in [employer] [facility]? You can also review all of your test results from your hospital stay or ER visit. Additional Information If you have questions, please visit the Frequently Asked Questions section of the [employer] website at [email or web address] Remember, [employer] [facility] is NOT to be used for urgent needs. For medical emergencies, dial 911. Now available from your iPhone and Android! General Information Please provide this summary of care documentation to your next provider. Patient Signature:  ____________________________________________________________ Date:  ____________________________________________________________  
  
Garrett Endo Provider Signature:  ____________________________________________________________ Date:  ____________________________________________________________

## 2017-08-23 NOTE — BRIEF OP NOTE
BRIEF OPERATIVE NOTE    Date of Procedure: 8/23/2017   [facility] Diagnosis: [facility] [C18.7]  Postoperative Diagnosis: [facility] sigmoid colon ([facility] [C18.7]    Procedure(s):  1. Sigmoid colectomy with colostomy  2. Abdominal wall resection  3. Partial cystectomy and fistula resection with primary repair  4. Partial hysterectomy and left salpingectomy   5. Mobilization of splenic flexture    Surgeon(s) and Role:     * Zenon Tran MD - Primary         Assistant Staff:       Surgical Staff:  Circ-1: Fara Ceballos RN  Circ-Relief: Ang Centeno RN; Eugene Rodriguez RN  Scrub Tech-1: Vladimir Medellin  Scrub Tech-2: Rao Ramires  Event Time In   Incision Start  8:19 AM   Incision Close 11:59 AM     Anesthesia: General   Estimated Blood Loss: 250 ml  Specimens:   ID Type Source Tests Collected by Time Destination   1 : SIGMOID COLON, ABDOMINAL WALL, BLADDER, PARTIAL UTERUS, LEFT TUBE Fresh Abdomen  Zenon Tran MD 8/23/2017 10:13 AM Pathology   2 : Manuel Murrell MD 8/23/2017 10:14 AM Pathology   3 : Emma Boyer MD 8/23/2017 10:19 AM Pathology      Findings:   1 locally advanced sigmoid cancer with direct invasion jules left abdominal wall, uterus, bladder and mesentery  2 dilated and thickened proximal colon indicating chronic colon obstruction  3.separate large small bowel mesentery implant prevents full mobilization of left colon.     Complications: none  Implants: LIANA x 1 in pelvis

## 2017-08-23 NOTE — PROGRESS NOTES
TRANSFER - IN REPORT:    Verbal report received from SHARONDA RAMOS) on [place]  being received from [employer]) for routine progression of care      Report consisted of patients Situation, Background, Assessment and   Recommendations(SBAR). Information from the following report(s) SBAR was reviewed with the receiving nurse. Opportunity for questions and clarification was provided. Assessment completed upon patients arrival to unit and care assumed.

## 2017-08-23 NOTE — OP NOTES
[address]   OPERATIVE REPORT       Name:  Carmen Can   MR#:  [id number]   :  1960   Account #:  [de-identified]   Date of Adm:  2017       DATE OF SURGERY: 2017. PREPROCEDURE DIAGNOSIS: Metastatic sigmoid cancer with   colovesical fistula. POSTPROCEDURE DIAGNOSIS: Metastatic sigmoid cancer with   colovesical fistula. NAME OF PROCEDURE:   1. Sigmoidectomy with colostomy. 2. Abdominal wall resection. 3. Partial cystectomy with fistula resection and primary repair. 4. Partial hysterectomy and left salpingectomy. 5. Mobilization of splenic flexure. INDICATIONS: This is a 54-year-old female who presented with   metastatic colon cancer and she had extensive chemotherapy. She   also had pelvic radiation due to bone metastases and over the   last few weeks, she noticed persistent urine infections and a CT   scan suggests colovesical fistula. I discussed the options to   fix this fistula and she definitely wanted to be fixed and   anticipating more chemotherapy or more aggressive treatment down   the road. She understood this required a major surgery to fix   this fistula, especially if there is concern this may be   secondary to tumor invasion or radiation and we also discussed   the colostomy is necessary in this condition and it will very   well be a permanent colostomy. The patient understood all of   this risk and agreed to proceed. FINDINGS:   1. She does have locally advanced sigmoid cancer with direct   invasion into the left abdominal wall, uterus, bladder and the   mesentery and the fistula appeared to be the consequence of   tumor invasion, although there is a small abscess cavity between   the colon and bladder. 2. Her descending colon, the proximal to the cancer, is dilated and   thickened which indicated chronic colonic obstruction. 3. She has a separate large small bowel mesentery implant which   prevented full mobilization of left colon. After complete   mobilization of splenic flexures, I was able to bring descending   colon to the skin level for the colostomy. PROCEDURE: After informed consent obtained, the patient was   brought into the operating room and left in supine position. General anesthesia was administered. The patient was then   prepped and draped in the usual routine fashion. A lower midline   incision was made and the peritoneal cavity was easily entered. We immediately noticed the sigmoid colon stuck to the anterior   abdominal wall and the lateral pelvic wall and the proximal   colon was dilated. The rectum appeared actually relatively   normal. Further exploration revealed the cancer stuck to the   left dome of the bladder and the left side of the uterus and the   tube and after careful mobilization the tumor off the abdominal   wall, the tumor appeared to be mobile, which allows resection. Then we started to dissect the mass off the left lateral pelvic   wall and the abdominal wall and the posterior fascia was taken. She did have abdominal wall hernia just superior to the tumor   site with incarcerated omentum and the omentum was reduced and   the hernia was left alone and that will continue mobilize   laterally. We stayed right around the cancers to avoid dissection   into the pelvic blood vessels, especially the ureter, and the   entire dissection was done sharply and the tissue was completely   dissected. After complete confirmation then the Impact bipolar   device was used. We continued the dissection cephalad into the   descending colon. The descending colon was very thick and the   full of hard stool and this was circumferentially isolated and   then I used a contour stapler to divide the descending colon and   then I moved attention to the pelvis. We first cut the uterus on   the left side and to gain some freedom and then the tumor was   dissected off the pelvic wall.  I then identified a small abscess cavity which was right next to the bladder and there was a hole   into the bladder, this was clearly a fistula tract and the   bladder in this location is very thickened with probably   combination of tumor invasion and inflammatory reaction to the   colovesical fistula. So fortunately this was around the dome of   the bladder so I took some bladder tissues together with the   fistula tract and the abscess cavity. This all removed together. The upper rectum was isolated circumferentially. This was again   divided with a contour stapler. Then the mesentery was divided   with Impact bipolar device. We stayed the dissection right below   the cancers, trying to avoid dissecting into the   retroperitoneum. Again the entire dissection was done sharply   and there was no bulky tissues divided bluntly. I was not able   to identify the ureter but I left behind a thick layer of retroperitoneal tissues. There was no evidence of urine leak during the entire case. On   the last part, the sigmoid mesentery was dissected. The tumor clearly   went into the mesentery and again I keep the dissection right   below the cancer and then the specimen was able to be removed,   this including the sigmoid colon cancer, abdominal wall, part of   the bladder, and the uterus, which were removed en bloc. Then   additional mesentery mass was dissected off the sigmoid   mesentery and the surgical field inspected, no evidence of   bleeding at this point and myotomy site on the uterus was closed   with running 0 Vicryl stitch and the bladder was closed with 3-0   Vicryl stitch for full layer including mucosa and the muscle   layer in running fashion. Then I reinforced with interrupted 3-0   Vicryl stitch on the seromuscular layer and I put 240 mL saline   in the bladder and the bladder was tested under pressure, no   evidence of leak. Then we moved attention to the descending   colon and the splenic flexure was fully mobilized.  As noted, she has mesentery implants. This is a close to the root of the small   bowel mesentery, which prevented more fully mobilization of her   left colon, but with maximal effort, I was able to bring the   descending colon up to the skin level in the left upper quadrant   and a cone of abdominal wall was removed. cone biopsy and then   the colostomy was brought out to the skin level and the surgical   field was inspected and a LIANA was left in the pelvis and as a #19   and then the midline fascia closed on the fascia with 0 Vicryl   stitch and the skin was closed with staples and eventually the   colostomy was matured with 3-0 Vicryl stitch. The patient   tolerated the procedure well, transferred to recovery room in   stable condition. All the instrument count and lap count   correct. Estimated blood loss about 250 mL.         Madilyn Boxer, MD      BY / Keaton Cespedes   D:  08/23/2017   13:14   T:  08/23/2017   14:37   Job #:  [id number]

## 2017-08-23 NOTE — PROGRESS NOTES
's Pre-op visit requested by patient. Conveyed care and concern for patient and family. Offered prayer as requested for patient, family, and staff.     Thang Kennedy MDiv, BS  Board Certified

## 2017-08-23 NOTE — ANESTHESIA PREPROCEDURE EVALUATION
Anesthetic History   No history of anesthetic complications            Review of Systems / Medical History  Patient summary reviewed and pertinent labs reviewed    Pulmonary  Within defined limits                 Neuro/Psych         Psychiatric history (Anxiety/Panic attacks)     Cardiovascular                  Exercise tolerance: >4 METS     GI/Hepatic/Renal  Within defined limits              Endo/Other        Cancer (Metastatic colon CA)     Other Findings              Physical Exam    Airway  Mallampati: II  TM Distance: > 6 cm  Neck ROM: normal range of motion   Mouth opening: Normal     Cardiovascular    Rhythm: regular  Rate: normal         Dental    Dentition: Edentulous     Pulmonary  Breath sounds clear to auscultation               Abdominal         Other Findings            Anesthetic Plan    ASA: 2  Anesthesia type: general            Anesthetic plan and risks discussed with: Patient and Spouse

## 2017-08-23 NOTE — PROGRESS NOTES
Primary Nurse Fredo Rust and Danielito Gibbs , RN performed a dual skin assessment on this patient Impairment noted- see wound doc flow sheet  Kadeem score is 20  Joe drain   Midline incision

## 2017-08-24 NOTE — PROGRESS NOTES
[facility] SURGICAL PROGRESS NOTE    PLAN:  Discontinue NGT  Start sips of clears   Replace Magnesium     ASSESSMENT:  Admit Date: 8/23/2017   1 Day Post-Op  Procedure(s):  COLOSTOMY  LAPAROTOMY EXPLORATORY/ COLOVESICLE FISUTLA REPAIR / collectomy.  abdominal wall resection/ partial hysterectomy/ partial cystectomy      Principal Problem:    Cancer of sigmoid colon ([facility] [address] (8/23/2017)       SUBJECTIVE:  Complains of abdominal pain   NGT removed at bedside per Dr Nereida Seth     OBJECTIVE:  Visit Daniel Naheed /79    Pulse (!) 115    Temp 98 °F (36.7 °C)    Resp 18    Ht 5' 5\" (1.651 m)    Wt 158 lb 2 oz (71.7 kg)    SpO2 97%    BMI 26.31 kg/m2       Intake/Output Summary (Last 24 hours) at 08/24/17 0920  Last data filed at 08/24/17 0542   Gross per 24 hour   Intake             1000 ml   Output             1400 ml   Net             -400 ml      UOP 1190 ml / 24h   Ostomy  ml / 24h   LIANA  ml / 24h     General: No acute distress    Lungs: CTA  Heart:  Tachy and regular   Abdomen: Soft, hypoactive BS, incision staples intact w/o sign of infection, ostomy viable with brown liquid in bag   Extremities: No edema         Patient Vitals for the past 24 hrs:   BP Temp Pulse Resp SpO2   08/24/17 0707 127/79 98 °F (36.7 °C) (!) 115 18 97 %   08/24/17 0534 142/73 98 °F (36.7 °C) (!) 114 18 95 %   08/23/17 2257 122/77 98.1 °F (36.7 °C) (!) 115 18 95 %   08/23/17 2110 - - - - 94 %   08/23/17 1951 119/71 98.3 °F (36.8 °C) (!) 115 18 96 %   08/23/17 1838 119/77 98.1 °F (36.7 °C) (!) 120 17 96 %   08/23/17 1605 - - - - 100 %   08/23/17 1532 137/88 98.1 °F (36.7 °C) (!) 120 17 99 %   08/23/17 1430 154/77 - (!) 112 16 -   08/23/17 1410 140/42 - (!) 112 16 -   08/23/17 1330 141/78 (!) 48.7 °F (9.3 °C) (!) 111 16 100 %   08/23/17 1310 131/75 - (!) 111 18 100 %   08/23/17 1255 133/73 - (!) 110 20 -   08/23/17 1245 127/75 - (!) 114 20 -   08/23/17 1230 143/72 - (!) 108 16 -   08/23/17 1220 164/74 99.1 °F (37.3 °C) (!) 107 20 99 % 08/23/17 1217 164/74 99.1 °F (37.3 °C) (!) 107 20 99 %     Labs:  Recent Labs      08/24/17   0425  08/23/17   0725   WBC  6.2   --    HGB  8.6*   --    PLT  327   --    NA  140   --    K  3.6   --    CL  102   --    CO2  31   --    BUN  14   --    CREA  0.51*   --    GLU  105*   --    PTP   --   11.3   INR   --   1.0   APTT   --   33.5*       Elane Burkitt., PA

## 2017-08-24 NOTE — WOUND CARE
Colostomy overview and literature given. Patient does not want to talk about ostomy and will not look at abdomen. Stoma pink budded, bag intact scant serosanguinous drainage in pouch. Will monitor.

## 2017-08-25 NOTE — PROGRESS NOTES
Critical lab potassium 2.8, called Dr Francine Powell.  Orders received to use replacement therapy protocol from [facility] surgical.
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
## 2017-08-25 NOTE — PROGRESS NOTES
PLAN:  Keep May x 10 days  OOB to Chair  Advance to Clear Liquid Diet  Follow Labs  Cathflo to port  Pain/ nausea control  IVF - D5% - .45% NaCl @ 50cc/hr  Up ad mandi    ASSESSMENT:  Admit Date: 8/23/2017   2 Days Post-Op  Procedure(s):  COLOSTOMY  LAPAROTOMY EXPLORATORY/ COLOVESICLE FISUTLA REPAIR / collectomy. abdominal wall resection/ partial hysterectomy/ partial cystectomy      Principal Problem:    Cancer of sigmoid colon ([address] (8/23/2017)       SUBJECTIVE:  Pt awake in bed. No new complaints. Family at bedside. AF, NAD. OBJECTIVE:  Constitutional: Alert, cooperative, no acute distress; appears stated age   Visit Vitals    BP (!) 153/91    Pulse (!) 110    Temp 97.5 °F (36.4 °C)    Resp 18    Ht 5' 5\" (1.651 m)    Wt 158 lb 2 oz (71.7 kg)    SpO2 99%    BMI 26.31 kg/m2     Eyes:Sclera are clear. ENMT: no external lesions gross hearing normal; no obvious neck masses, no ear or lip lesions  CV: Tachy. Normal perfusion  Resp: No JVD. Breathing is  non-labored; no audible wheezing. GI: Soft, BS active, incision staples c/d/i - clear dressing removed by Dr. Gregoria Sanders, ostomy viable with air in bag   : may patent - 2650cc out last 24 hours. Musculoskeletal: unremarkable with normal function. No embolic signs or cyanosis.    Neuro:  Oriented; moves all 4; no focal deficits  Psychiatric: normal affect and mood      Patient Vitals for the past 24 hrs:   BP Temp Pulse Resp SpO2   08/25/17 1453 (!) 153/91 97.5 °F (36.4 °C) (!) 110 18 99 %   08/25/17 1116 132/80 98.1 °F (36.7 °C) 99 18 99 %   08/25/17 0724 141/79 98.1 °F (36.7 °C) (!) 114 18 99 %   08/25/17 0336 153/82 98.1 °F (36.7 °C) (!) 109 18 99 %   08/24/17 2308 142/83 98.5 °F (36.9 °C) (!) 109 18 96 %   08/24/17 1929 132/77 98 °F (36.7 °C) (!) 118 20 93 %     Labs:  Recent Labs      08/25/17   0500   08/23/17   0725   WBC  6.8   < >   --    HGB  8.4*   < >   --    PLT  345   < >   --    NA  137   < >   --    K  2.8*   < >   --    CL  97*   < >   -- CO2  30   < >   --    BUN  7   < >   --    CREA  0.38*   < >   --    GLU  95   < >   --    PTP   --    --   11.3   INR   --    --   1.0   APTT   --    --   33.5*    < > = values in this interval not displayed. Kendell Sanchez, [facility]    The patient was seen in conjunction with Dr. Yvonne Cole who independently evaluated the patient, reviewed the chart and agreed with the assessment and plan.

## 2017-08-25 NOTE — PROGRESS NOTES
LMSW informed by therapy that pt could benefit from In Pt rehab if agreeable. LMSW met with pt and daug at bedside. Discussed rehab options. Pt/daughter are agreeable to consider 9th floor acute rehab if appropriate and/or HH for follow up in home. Will confer with MD about pt plan of care and assist with supportive referrals as exact needs are known.

## 2017-08-25 NOTE — WOUND CARE
Colostomy lesson with patient, sister, daughter and spouse. Patient drowsy and did not pay attention. Patient's sister, daughter and spouse verbalized understanding. Shown pouch change on model. Discussion includes literature review, pouch emptying and pouch changing. Stoma pink budded, scant serosanguinous drainage in bag. Will continue to monitor.

## 2017-08-25 NOTE — PROGRESS NOTES
Pt has a lot anxiety and not able to rest.O2 saturation dropped to 80 during sleep. Pt is now on 2L of O2.   Gave ativan, patient became calm and O2 is at 99%

## 2017-08-26 NOTE — PROGRESS NOTES
Patient given 1 mg of ativan. Patient still restless and can't sleep. Called Dr. Heriberto Lizama, she ordered 15 mg of Restoril.  Will continue to monitor patient

## 2017-08-26 NOTE — PROGRESS NOTES
Problem: Falls - Risk of  Goal: *Absence of Falls  Document Ab Fall Risk and appropriate interventions in the flowsheet.    Outcome: Progressing Towards Goal  Fall Risk Interventions:  Mobility Interventions: Bed/chair exit alarm, Patient to call before getting OOB           Medication Interventions: Teach patient to arise slowly, Patient to call before getting OOB     Elimination Interventions: Call light in reach, Patient to call for help with toileting needs, Toileting schedule/hourly rounds

## 2017-08-26 NOTE — PROGRESS NOTES
PLAN:  Keep May x 10 days  OOB to Chair  Clear Liquid Diet  Await more colostomy output  Follow Labs  Pain/ nausea control  K+ 2.8 today--->replace and add KCL to IVF (running at 50cc/hr)  Up ad mandi    ASSESSMENT:  Admit Date: 8/23/2017   POD3 Days Post-Op  Procedure(s):  COLOSTOMY  LAPAROTOMY EXPLORATORY/ [address] / collectomy. abdominal wall resection/ partial hysterectomy/ partial cystectomy      Principal Problem:    Cancer of sigmoid colon ([facility] [address] (8/23/2017)       SUBJECTIVE:  Pt awake in bed. No new complaints. Family at bedside. AF, NAD. OBJECTIVE:  Constitutional: Alert, cooperative, no acute distress; appears stated age   Visit Vitals    /90    Pulse 97    Temp 98 °F (36.7 °C)    Resp 18    Ht 5' 5\" (1.651 m)    Wt 158 lb 2 oz (71.7 kg)    SpO2 98%    BMI 26.31 kg/m2     Eyes:Sclera are clear. ENMT: no external lesions gross hearing normal; no obvious neck masses, no ear or lip lesions  CV: Tachy. Normal perfusion  Resp: No JVD. Breathing is  non-labored; no audible wheezing. GI: Soft, incision clear, colostomy viable, no air in bag this am   : may. Musculoskeletal: unremarkable with normal function. No embolic signs or cyanosis.    Neuro:  Oriented; moves all 4; no focal deficits  Psychiatric: normal affect and mood      Patient Vitals for the past 24 hrs:   BP Temp Pulse Resp SpO2   08/26/17 0759 161/90 98 °F (36.7 °C) 97 18 98 %   08/26/17 0319 157/88 98.5 °F (36.9 °C) (!) 111 18 99 %   08/25/17 2152 (!) 159/92 - (!) 124 - -   08/25/17 1920 148/86 98.1 °F (36.7 °C) (!) 123 20 100 %   08/25/17 1453 (!) 153/91 97.5 °F (36.4 °C) (!) 110 18 99 %   08/25/17 1116 132/80 98.1 °F (36.7 °C) 99 18 99 %     Labs:    Recent Labs      08/26/17   0400   WBC  7.3   HGB  9.1*   PLT  342   NA  135*   K  2.8*   CL  96*   CO2  31   BUN  6   CREA  0.49*   GLU  124*

## 2017-08-26 NOTE — PROGRESS NOTES
Pt hypertensive and HR around 134. Pt reports feeling itchy and remains restless. Paging surgery provider for further action.     Orders received for EKG and consult for hospitalist.

## 2017-08-26 NOTE — PROGRESS NOTES
OT NOTE:    OT order received, chart reviewed. Attempted to see pt, sister and mother in room request therapist come back tomorrow seconsry to pt sleeping and having \"a rough morning\". Pt is difficult to arouse, opened right eye only slightly. OT will attempt evaluation tomorrow.   Thank you,    Mitzy Beaulieu MS, OTR/L  8/26/2017

## 2017-08-26 NOTE — PROGRESS NOTES
Called Dr Izzy Chan for patients heart rate of 124. Dr. Izzy Chan ordered remote telemetry. Will continue to monitor patient.

## 2017-08-27 NOTE — PROGRESS NOTES
First unit of PRBC infusing at the moment. Pt pain manageable at the moment. Tolerated being off PCA dilaudid. Pt had small BM in colostomy bag. Small, brown, formed. Pt's  reports pt cannot take flexeril. Paged hospitalist and instruction to investigate reason as to why pt cannot take that muscle relaxer. Pt's  reports flexeril causes pt to \"say crazy things\". Hospitalist alerted to this. Instructions received to order zanaflex and discontinue flexeril.

## 2017-08-27 NOTE — PROGRESS NOTES
Hospitalist Progress Note    Subjective:   Daily Progress Note: 8/27/2017 1:43 PM    History:  Patient with metastatic colon cancer and colovesical fistula with extensive chemo and radiation and bone mets directly admitted to hospital per Dr Shaheed Field. Her cancer was also advanced to the left abdominal wall, uterus, bladder and mesentery. The fistula appeared to be the consequence of tumor invasion. She underwent sigmoid colectomy with colostomy, abdominal wall resection, partial cystectomy and fistula resection with primary repair, partial hysterectomy and left salpingectomy, and mobilization of splenic fixture per Dr Shaheed Field on 8/23. She has done well post op with the exception of being unable to look at her colostomy and refusing to discuss same with wound nurse. Very anxious. Potassium down to 2.5 on 8/25. To keep may for ten days total.  Diet advanced to clear liquid on 8/25. She remained tachycardic post op, up to 125, placed on remote telemetry. Hypertensive intermittently. Hospitalist consult ordered 8/26 for hypertension and tachycardia. Patient reported taking ativan q 4 hours at home, also drinks without having any here. Not taking antihypertensives at home. Placed on metoprolol on 8/26, added prn hydralazine. IVF increased  Seen by physical and occupational therapy. Considering rehab. Today:  hgb down to 8.1. Potassium 3.1. Potassium replaced by surgery. Reports she is unable to sleep due to restless legs, moving about in bed, rubbing legs. Had Stool today. Very anxious. Family at bedside. Blood pressure and heart rate remain elevated. Additional history includes anxiety and panic attacks.       Current Facility-Administered Medications   Medication Dose Route Frequency    LORazepam (ATIVAN) tablet 1 mg  1 mg Oral TID    dextrose 5% - 0.45% NaCl with KCl 20 mEq/L infusion  25 mL/hr IntraVENous CONTINUOUS    potassium chloride 20 mEq in 100 ml IVPB  20 mEq IntraVENous Q2H    0.9% sodium chloride infusion 250 mL  250 mL IntraVENous PRN    HYDROmorphone in NS (DILAUDID) injection 1 mg  1 mg IntraVENous Q4H PRN    temazepam (RESTORIL) capsule 15 mg  15 mg Oral QHS PRN    oxyCODONE-acetaminophen (PERCOCET) 5-325 mg per tablet 2 Tab  2 Tab Oral Q6H PRN    PARoxetine (PAXIL) tablet 40 mg  40 mg Oral DAILY    diphenhydrAMINE (BENADRYL) injection 25 mg  25 mg IntraVENous Q6H PRN    magnesium hydroxide (MILK OF MAGNESIA) 400 mg/5 mL oral suspension 30 mL  30 mL Oral BID    metoprolol tartrate (LOPRESSOR) tablet 25 mg  25 mg Oral Q12H    famotidine (PF) (PEPCID) 20 mg in sodium chloride 0.9 % 10 mL injection  20 mg IntraVENous Q12H    docusate sodium (COLACE) capsule 100 mg  100 mg Oral BID    sodium chloride (NS) flush 5-10 mL  5-10 mL IntraVENous Q8H    sodium chloride (NS) flush 5-10 mL  5-10 mL IntraVENous PRN    ondansetron (ZOFRAN) injection 4 mg  4 mg IntraVENous Q4H PRN    enoxaparin (LOVENOX) injection 40 mg  40 mg SubCUTAneous Q24H    levoFLOXacin (LEVAQUIN) 750 mg in D5W IVPB  750 mg IntraVENous Q24H        Review of Systems  A comprehensive review of systems was negative except for that written in the HPI. Objective:     Visit Vitals    /86 (BP 1 Location: Right arm, BP Patient Position: At rest)    Pulse (!) 117    Temp 97.6 °F (36.4 °C)    Resp 16    Ht 5' 5\" (1.651 m)    Wt 71.7 kg (158 lb 2 oz)    SpO2 100%    BMI 26.31 kg/m2    O2 Flow Rate (L/min): 2 l/min O2 Device: Nasal cannula    Temp (24hrs), Av °F (36.7 °C), Min:97.4 °F (36.3 °C), Max:99 °F (37.2 °C)      701 -  1900  In: -   Out: 9914 [Urine:1025]  1901 -  0700  In: -   Out: 2778 [Urine:4025; Drains:90]    General appearance: Thin, pale, anxious and alert. Mother and daughter at bedside. Cooperative, complains of intense restless legs. Discussed possible treatment options and low potassium.     Head: Normocephalic, without obvious abnormality, atraumatic  Eyes: conjunctivae/corneas clear. PERRL  Throat: Lips, mucosa, and tongue normal. Teeth and gums normal  Neck: supple, symmetrical, trachea midline, no JVD  Lungs: clear to auscultation bilaterally  Heart: Tachy in low 100's. Regular rate and rhythm, S1, S2 normal, no murmur, click, rub or gallop  Abdomen: ostomy draining, soft, non-tender. Bowel sounds normal. No masses,  no organomegaly  Extremities: extremities normal, atraumatic, no cyanosis or edema  Skin: Skin color pale, texture, turgor normal. No rashes or lesions  Neurologic: Grossly normal    Additional comments:  Notes, orders, test results, vitals reviewed. Data Review    Recent Results (from the past 24 hour(s))   CBC WITH AUTOMATED DIFF    Collection Time: 08/27/17  5:30 AM   Result Value Ref Range    WBC 7.1 4.3 - 11.1 K/uL    RBC 3.42 (L) 4.05 - 5.25 M/uL    HGB 8.1 (L) 11.7 - 15.4 g/dL    HCT 26.1 (L) 35.8 - 46.3 %    MCV 76.3 (L) 79.6 - 97.8 FL    MCH 23.7 (L) 26.1 - 32.9 PG    MCHC 31.0 (L) 31.4 - 35.0 g/dL    RDW 20.3 (H) 11.9 - 14.6 %    PLATELET 438 460 - 996 K/uL    MPV 8.8 (L) 10.8 - 14.1 FL    DF AUTOMATED      NEUTROPHILS 81 (H) 43 - 78 %    LYMPHOCYTES 8 (L) 13 - 44 %    MONOCYTES 8 4.0 - 12.0 %    EOSINOPHILS 3 0.5 - 7.8 %    BASOPHILS 0 0.0 - 2.0 %    IMMATURE GRANULOCYTES 0.4 0.0 - 5.0 %    ABS. NEUTROPHILS 5.8 1.7 - 8.2 K/UL    ABS. LYMPHOCYTES 0.5 0.5 - 4.6 K/UL    ABS. MONOCYTES 0.6 0.1 - 1.3 K/UL    ABS. EOSINOPHILS 0.2 0.0 - 0.8 K/UL    ABS. BASOPHILS 0.0 0.0 - 0.2 K/UL    ABS. IMM.  GRANS. 0.0 0.0 - 0.5 K/UL   METABOLIC PANEL, BASIC    Collection Time: 08/27/17  5:30 AM   Result Value Ref Range    Sodium 138 136 - 145 mmol/L    Potassium 3.1 (L) 3.5 - 5.1 mmol/L    Chloride 99 98 - 107 mmol/L    CO2 34 (H) 21 - 32 mmol/L    Anion gap 5 (L) 7 - 16 mmol/L    Glucose 117 (H) 65 - 100 mg/dL    BUN 6 6 - 23 MG/DL    Creatinine 0.54 (L) 0.6 - 1.0 MG/DL    GFR est AA >60 >60 ml/min/1.73m2    GFR est non-AA >60 >60 ml/min/1.73m2    Calcium 9.3 8.3 - 10.4 MG/DL   EKG, 12 LEAD, INITIAL    Collection Time: 08/27/17  7:18 AM   Result Value Ref Range    Ventricular Rate 111 BPM    Atrial Rate 111 BPM    P-R Interval 142 ms    QRS Duration 80 ms    Q-T Interval 354 ms    QTC Calculation (Bezet) 481 ms    Calculated P Axis 32 degrees    Calculated R Axis 30 degrees    Calculated T Axis 20 degrees    Diagnosis       Sinus tachycardia  Minimal voltage criteria for LVH, may be normal variant  Borderline ECG    Confirmed by [facility] MD (), PATRICIA STEVENSON ([id number]) on 8/27/2017 11:35:23 AM       CXR:  Normal.      Assessment/Plan:   Sigmoid colon cancer with metastasis  S/P sigmoid colectomy with colostomy, abdominal wall resection, partial cystectomy and fistula resection with primary repair, partial hysterectomy and left salpingectomy, and mobilization of splenic fixture    Post op anemia  Hypokalemia  Restless legs  Anxiety    PLAN:  Increase lopressor to 50 mg bid and ativan to q 4 hours prn  Try flexeril  Potassium replaced per surgery  Mag, CBC, CMP tomorrow    Care Plan discussed with:   Patient, family, RN    Signed By: Leora Moore NP     August 27, 2017         Pt discussed with the NP. I agree with the assessment and plan.
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
## 2017-08-27 NOTE — PROGRESS NOTES
Spoke with pharmacist about possible interactions of zanaflex with pepcid and levaquin. Pharmacist suggests starting on lowest dose of zanaflex for now which would be 2mg. zanaflex 2mg TID PRN ordered.

## 2017-08-27 NOTE — PROGRESS NOTES
PLAN:  Keep May x 10 days  OOB to Chair  Clear Liquid Diet  Await more colostomy output  Follow Labs  Pain/ nausea control  K+ 2.8--->3.1--->added KCL to IVF (running at 50cc/hr) and replacing PRN  Up ad mandi  Tachy and hypertensive last night -->EKG shows sinus tachycardia  I asked Hospitalists to manage; They are following and have added BP med    ASSESSMENT:  Admit Date: 8/23/2017   POD4 Days Post-Op  Procedure(s):  COLOSTOMY  LAPAROTOMY EXPLORATORY/ [address] / collectomy. abdominal wall resection/ partial hysterectomy/ partial cystectomy      Principal Problem:    Cancer of sigmoid colon ([facility] [address] (8/23/2017)       SUBJECTIVE:  Pt awake in bed. No new complaints. Family at bedside. AF, NAD. OBJECTIVE:  Constitutional: Alert, cooperative, no acute distress; appears stated age   Visit Vitals    /79 (BP 1 Location: Right arm, BP Patient Position: At rest)    Pulse (!) 123    Temp 98.3 °F (36.8 °C)    Resp 16    Ht 5' 5\" (1.651 m)    Wt 158 lb 2 oz (71.7 kg)    SpO2 99%    BMI 26.31 kg/m2       Eyes:Sclera are clear. ENMT: no external lesions gross hearing normal; no obvious neck masses, no ear or lip lesions  CV: Tachy. Normal perfusion  Resp: No JVD. Breathing is  non-labored; no audible wheezing. GI: Soft, incision clear, colostomy viable   : may. Musculoskeletal: unremarkable with normal function. No embolic signs or cyanosis.    Neuro:  Oriented; moves all 4; no focal deficits  Psychiatric: blunted affect      Patient Vitals for the past 24 hrs:   BP Temp Pulse Resp SpO2   08/27/17 0802 147/79 98.3 °F (36.8 °C) (!) 123 16 99 %   08/27/17 0343 142/88 97.5 °F (36.4 °C) (!) 107 18 95 %   08/26/17 2338 128/80 97.4 °F (36.3 °C) (!) 104 16 94 %   08/26/17 2204 (!) 166/94 - (!) 144 - -   08/26/17 1919 (!) 158/104 99 °F (37.2 °C) (!) 134 18 99 %   08/26/17 1352 - - 100 - -   08/26/17 1158 (!) 149/93 98.2 °F (36.8 °C) (!) 113 18 95 %     Labs:    Recent Labs      08/27/17 0530   WBC  7.1   HGB  8.1*   PLT  296   NA  138   K  3.1*   CL  99   CO2  34*   BUN  6   CREA  0.54*   GLU  117*

## 2017-08-27 NOTE — PROGRESS NOTES
Problem: Falls - Risk of  Goal: *Absence of Falls  Document Ab Fall Risk and appropriate interventions in the flowsheet.    Outcome: Progressing Towards Goal  Fall Risk Interventions:  Mobility Interventions: Patient to call before getting OOB           Medication Interventions: Patient to call before getting OOB     Elimination Interventions: Call light in reach, Patient to call for help with toileting needs, Toileting schedule/hourly rounds
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
## 2017-08-28 NOTE — PROGRESS NOTES
Pt's family asking about LIANA drain removal. Calling on call surgery provider for further action. [phone] NP for Dr Vaughn Castro approves of LIANA drain being removed.

## 2017-08-28 NOTE — PROGRESS NOTES
PT note: Patient having colostomy changed this morning when therapy attempted. Will check back later.      Carl Lainez DPT
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
## 2017-08-28 NOTE — WOUND CARE
Colostomy lesson, review of how to order pouches. Demonstration of pouch change on patient, family verbalized understanding. Patient drowsy during entire lesson and unable to participate. Stoma is pink, budded, dimple/ crease at 7 o'clock of stoma, may need convexity. Will monitor.

## 2017-08-28 NOTE — ADT AUTH CERT NOTES
Utilization Review           Bowel Surgery: Colectomy, Partial, with or without Ostomy - Care Day 5 (8/27/2017) by Sherine Blackmon RN        Review Status Review Entered       Completed 8/28/2017       Details              Care Day: 5 Care Date: 8/27/2017 Level of Care: Inpatient Floor       Guideline Day 2        Level Of Care       (X) Floor              Clinical Status       (X) * Procedure completed       ( ) * Hemodynamic stability       8/28/2017 1:34 PM EDT by Enrique Lange         hr remains elevated                     Activity       (X) * Progressive ambulation              Routes       (X) IV fluids, medications              Medications       (X) Epidural, PCA, or other analgesia              8/28/2017 1:34 PM EDT by Enrique Lange       Subject: Additional Clinical Information          99.6, 133, 17, 156/92, 96%         ãLabs RBC 3.42, H/H 8.1/26.1, K+ 3.1         EKG:                  Sinus tachycardia  Minimal voltage criteria for LVH, may be normal variant  Borderline ECG                   Meds:D51/2 NS w/ KCL 20meq [email or web address] now at 25cc/hr, Pepcid 20mg IV q 12hr, Dilaudid 1mg IV, Levaquin 750mg IV q 24hr, Percocet 5-325mg po q6hr PRN, Potassium 20meq IV x 3, Dilaudid PCA, Ativan 1mg po 3x/d, Ativan 1mg po q6 PRN                                                                                         * Milestone              Additional Notes       8/27/17              History:  Patient with metastatic colon cancer and colovesical fistula with extensive chemo and radiation and bone mets directly admitted to hospital per Dr Carlos Manuel Hammond.  Her cancer was also advanced to the left abdominal wall, uterus, bladder and mesentery.  The fistula appeared to be the consequence of tumor invasion.  She underwent sigmoid colectomy with colostomy, abdominal wall resection, partial cystectomy and fistula resection with primary repair, partial hysterectomy and left salpingectomy, and mobilization of splenic fixture per Dr Alisha Jones on 8/23Sagarlizette Kelley has done well post op with the exception of being unable to look at her colostomy and refusing to discuss same with wound nurse. Estil Amanda anxious.  Potassium down to 2.5 on 8/25.  To keep may for ten days total.  Diet advanced to clear liquid on 8/25.  She remained tachycardic post op, up to 125, placed on remote telemetry.  Hypertensive intermittently.  Hospitalist consult ordered 8/26 for hypertension and tachycardia.  Patient reported taking ativan q 4 hours at home, also drinks without having any here.  Not taking antihypertensives at home.  Placed on metoprolol on 8/26, added prn hydralazine. IVF increased       Seen by physical and occupational therapy.  Considering rehab.                Today:  hgb down to 8. 1.  Potassium 3.1.  Potassium replaced by surgery.  Reports she is unable to sleep due to restless legs, moving about in bed, rubbing legs.  Had Stool today.  Very anxious.  Family at bedside.  Blood pressure and heart rate remain elevated.                 Additional history includes anxiety and panic attacks.          Sigmoid colon cancer with metastasis       Assessment /PlanS/P sigmoid colectomy with colostomy, abdominal wall resection, partial cystectomy and fistula resection with primary repair, partial hysterectomy and left salpingectomy, and mobilization of splenic fixture         Post op anemia       Hypokalemia       Restless legs       Anxiety               PLAN:       Increase lopressor to 50 mg bid and ativan to q 4 hours prn       Try flexeril       Potassium replaced per surgery       Mag, CBC, CMP tomorrow               Care Plan discussed with:   Patient, family, RN           Bowel Surgery: Colectomy, Partial, with or without Ostomy - Care Day 4 (8/26/2017) by Tiffanie Larios RN        Review Status Review Entered       Completed 8/28/2017       Details              Care Day: 4 Care Date: 8/26/2017 Level of Care: Inpatient Floor       Guideline Day 2        Level Of Care       (X) Floor              Clinical Status       (X) * Procedure completed       8/28/2017 12:52 PM EDT by Elsi Osorio         Procedure(s): 1. Sigmoid colectomy with colostomy 2. Abdominal wall resection 3. Partial cystectomy and fistula resection with primary repair 4. Partial hysterectomy and left salpingectomy  5. Mobilization of splenic flexture              ( ) * Hemodynamic stability              Activity       (X) * Progressive ambulation       8/28/2017 12:52 PM EDT by Elsi Osorio         OOB to chair                     Routes       (X) IV fluids, medications       8/28/2017 12:52 PM EDT by Elsi Osorio         D51/2NSw/ KCL 20meq [email or web address] 40mg SC, Pepcid 20mg IV, Levaquin 750mg IV, Percocet 5/325mg po x 2, Magnesium 2g IV, Metoprolol 25mg po, Ativan 1mg IV, Potassium 20meq IV x2                     Medications       (X) Epidural, PCA, or other analgesia       8/28/2017 12:52 PM EDT by Elsi Osorio         IV Dilaudid PCA,              ( ) Discontinue perioperative antibiotics. [B]       8/28/2017 12:52 PM EDT by Elsi Osorio         On IV Levaquin q 24                     8/28/2017 12:52 PM EDT by Elsi Osorio       Subject: Additional Clinical Information               99, 134, 18, 158/104, 99%                  Labs: RBC 3.82, H/H 9.1/29.4, plt 83, Na 135, K+ 2.8.                                          * Milestone              Additional Notes       8/26/17              Patient 56F with PMHx of sigmoid cancer with metastasis, on chemo and rtx (completed in March 2-17) admitted for surgical correction of colovesical fistula. S/p ex lap/colovesicle fistula repair/ colectomy/ partial hysterectomy/ partial cystectomy on 8/23. Hospitalist have been consulted for htn/tachycardia.        Patient does admit to pain - apparently was without her IV pain meds yesterday per her sister at bedside.  Pt also admits to anxiety - usually takes ativan every four hours at home. Also not drinking as much as usual per her report.                o Cancer of sigmoid colon ([facility] [address] 08/23/2017               A/P       - Hypertension - metoprolol already added. Not on HTN meds at home. Will add prn hydralazine       - Tachycardia - Suspect combination of dehydration/pain and anxiety. Will increase ivf and schedule ativan.  Px meds per primary       - S/p colovesical fistula repair - as per primary, sx service

## 2017-08-28 NOTE — PROGRESS NOTES
Pt very upset over pt's prognosis. Pt were expecting oncology to come round with patient but has not been since she had been admitted to floor. Family feels guilt over pt having surgery done. Attempted to comfort family.

## 2017-08-28 NOTE — PROGRESS NOTES
RN notified physician of persistent tachycardia. I had ordered scheduled ativan yesterday d/t doses of prn not being given, order was again changed to prn. Pt reported to be very anxious. Will try scheduled librium. Margot Fleming

## 2017-08-28 NOTE — PROGRESS NOTES
LIANA drain removed. Pt tolerated well, has c/o pain, however. Dilaudid 1mg IVP given at 1809. Pt's family requesting oncology consult for pt. Will report off to walker torres RN.

## 2017-08-28 NOTE — PROGRESS NOTES
Hospitalist Progress Note    Subjective:   Daily Progress Note: 8/28/2017 2:14 PM    History:  Patient with metastatic colon cancer and colovesical fistula with extensive chemo and radiation and bone mets directly admitted to hospital per Dr Linus Victor. Her cancer was also advanced to the left abdominal wall, uterus, bladder and mesentery. The fistula appeared to be the consequence of tumor invasion. She underwent sigmoid colectomy with colostomy, abdominal wall resection, partial cystectomy and fistula resection with primary repair, partial hysterectomy and left salpingectomy, and mobilization of splenic fixture per Dr Linus Victor on 8/23. She has done well post op with the exception of being unable to look at her colostomy and refusing to discuss same with wound nurse. Very anxious. Potassium down to 2.5 on 8/25. To keep may for ten days total.  Diet advanced to clear liquid on 8/25. She remained tachycardic post op, up to 125, placed on remote telemetry. Hypertensive intermittently. Hospitalist consult ordered 8/26 for hypertension and tachycardia. Patient reported taking ativan q 4 hours at home, also drinks without having any here. Not taking antihypertensives at home. Placed on metoprolol on 8/26, added prn hydralazine. IVF increased  Seen by physical and occupational therapy. Considering rehab.      8/27:  hgb down to 8.1. Potassium 3.1. Potassium replaced by surgery. Reports she is unable to sleep due to restless legs, moving about in bed, rubbing legs. Had Stool today. Very anxious. Family at bedside. Blood pressure and heart rate remain elevated. Having a lot of leg spasms, RLS. Can not take flexeril. Will try zanaflex. Reluctant to use soma. 8/28: Mother and sister at bedside report difficulty balancing somnolence with pain meds vs pain having excruciating pain. Scheduled ativan discontinued for unknown reasons. Librium begun through night. Very frail, pale and wan appearing.   Drowsy, depressed, confused at times. Left side remains weak. Sister very un-nerved regarding care decisions. Does not feel patient is ready for hospice as discussed with Rehab MD, encouraged to discuss with Oncology. Tachycardia improved, but continues in the low 100's.   2 units PRBC being infused.      Current Facility-Administered Medications   Medication Dose Route Frequency    chlordiazePOXIDE (LIBRIUM) capsule 5 mg  5 mg Oral TID    dextrose 5% - 0.45% NaCl with KCl 20 mEq/L infusion  25 mL/hr IntraVENous CONTINUOUS    0.9% sodium chloride infusion 250 mL  250 mL IntraVENous PRN    metoprolol tartrate (LOPRESSOR) tablet 50 mg  50 mg Oral Q12H    HYDROmorphone (PF) (DILAUDID) injection 1 mg  1 mg IntraVENous Q4H PRN    tiZANidine (ZANAFLEX) tablet 2 mg  2 mg Oral TID PRN    temazepam (RESTORIL) capsule 15 mg  15 mg Oral QHS PRN    oxyCODONE-acetaminophen (PERCOCET) 5-325 mg per tablet 2 Tab  2 Tab Oral Q6H PRN    PARoxetine (PAXIL) tablet 40 mg  40 mg Oral DAILY    diphenhydrAMINE (BENADRYL) injection 25 mg  25 mg IntraVENous Q6H PRN    magnesium hydroxide (MILK OF MAGNESIA) 400 mg/5 mL oral suspension 30 mL  30 mL Oral BID    famotidine (PF) (PEPCID) 20 mg in sodium chloride 0.9 % 10 mL injection  20 mg IntraVENous Q12H    docusate sodium (COLACE) capsule 100 mg  100 mg Oral BID    sodium chloride (NS) flush 5-10 mL  5-10 mL IntraVENous Q8H    sodium chloride (NS) flush 5-10 mL  5-10 mL IntraVENous PRN    ondansetron (ZOFRAN) injection 4 mg  4 mg IntraVENous Q4H PRN    enoxaparin (LOVENOX) injection 40 mg  40 mg SubCUTAneous Q24H    levoFLOXacin (LEVAQUIN) 750 mg in D5W IVPB  750 mg IntraVENous Q24H        Review of Systems  Patient is unable at present    Objective:     Visit Vitals    /90 (BP 1 Location: Left arm, BP Patient Position: At rest)    Pulse (!) 101    Temp 98.1 °F (36.7 °C)    Resp 16    Ht 5' 5\" (1.651 m)    Wt 71.7 kg (158 lb 2 oz)    SpO2 99%    BMI 26.31 kg/m2    O2 Flow Rate (L/min): 2 l/min O2 Device: Room air    Temp (24hrs), Av.4 °F (36.9 °C), Min:97.8 °F (36.6 °C), Max:99.6 °F (37.6 °C)       1901 -  0700  In: 6522.3 [P.O.:240; I.V.:5948]  Out: 6994 [Urine:3175; Drains:20]    General appearance: Thin, pale, drowsy, confused at times. Mother and sister at bedside. Cooperative, complains of intense pain and restless legs. Spoke with sister at length as noted above. Minimal oral intake. Head: Normocephalic, without obvious abnormality, atraumatic  Eyes: conjunctivae/corneas clear. Left pupil not as responsive as right. Throat: Lips, mucosa, and tongue dry. Teeth and gums normal  Neck: supple, symmetrical, trachea midline, no JVD  Lungs: clear to auscultation bilaterally  Heart: Tachy in low 100's. Regular rate and rhythm, S1, S2 normal, no murmur, click, rub or gallop  Abdomen: ostomy draining, soft, non-tender. Bowel sounds normal. No masses,  no organomegaly  Extremities: having a lot of pain, muscle spasm and RLS symptoms. left side weakness. Poor circulation distally. Skin: Skin color pale, texture, turgor normal. No rashes or lesions  Neurologic: Left side weakness.       Additional comments:  Notes, orders, test results, vitals reviewed    Data Review    Recent Results (from the past 24 hour(s))   HEMOGLOBIN    Collection Time: 17 12:24 AM   Result Value Ref Range    HGB 11.9 11.7 - 15.4 g/dL   CBC WITH AUTOMATED DIFF    Collection Time: 17  5:10 AM   Result Value Ref Range    WBC 9.6 4.3 - 11.1 K/uL    RBC 4.20 4.05 - 5.25 M/uL    HGB 10.8 (L) 11.7 - 15.4 g/dL    HCT 33.0 (L) 35.8 - 46.3 %    MCV 78.6 (L) 79.6 - 97.8 FL    MCH 25.7 (L) 26.1 - 32.9 PG    MCHC 32.7 31.4 - 35.0 g/dL    RDW 18.6 (H) 11.9 - 14.6 %    PLATELET 725 211 - 016 K/uL    MPV 8.8 (L) 10.8 - 14.1 FL    DF AUTOMATED      NEUTROPHILS 86 (H) 43 - 78 %    LYMPHOCYTES 6 (L) 13 - 44 %    MONOCYTES 6 4.0 - 12.0 %    EOSINOPHILS 2 0.5 - 7.8 %    BASOPHILS 0 0.0 - 2.0 %    IMMATURE GRANULOCYTES 0.4 0.0 - 5.0 %    ABS. NEUTROPHILS 8.2 1.7 - 8.2 K/UL    ABS. LYMPHOCYTES 0.6 0.5 - 4.6 K/UL    ABS. MONOCYTES 0.5 0.1 - 1.3 K/UL    ABS. EOSINOPHILS 0.2 0.0 - 0.8 K/UL    ABS. BASOPHILS 0.0 0.0 - 0.2 K/UL    ABS. IMM. GRANS. 0.0 0.0 - 0.5 K/UL   MAGNESIUM    Collection Time: 08/28/17  5:10 AM   Result Value Ref Range    Magnesium 2.0 1.8 - 2.4 mg/dL   METABOLIC PANEL, COMPREHENSIVE    Collection Time: 08/28/17  5:10 AM   Result Value Ref Range    Sodium 136 136 - 145 mmol/L    Potassium 3.3 (L) 3.5 - 5.1 mmol/L    Chloride 98 98 - 107 mmol/L    CO2 34 (H) 21 - 32 mmol/L    Anion gap 4 (L) 7 - 16 mmol/L    Glucose 114 (H) 65 - 100 mg/dL    BUN 7 6 - 23 MG/DL    Creatinine 0.46 (L) 0.6 - 1.0 MG/DL    GFR est AA >60 >60 ml/min/1.73m2    GFR est non-AA >60 >60 ml/min/1.73m2    Calcium 9.4 8.3 - 10.4 MG/DL    Bilirubin, total 0.4 0.2 - 1.1 MG/DL    ALT (SGPT) 24 12 - 65 U/L    AST (SGOT) 56 (H) 15 - 37 U/L    Alk.  phosphatase 217 (H) 50 - 136 U/L    Protein, total 6.5 6.3 - 8.2 g/dL    Albumin 1.7 (L) 3.5 - 5.0 g/dL    Globulin 4.8 (H) 2.3 - 3.5 g/dL    A-G Ratio 0.4 (L) 1.2 - 3.5     PHOSPHORUS    Collection Time: 08/28/17  5:10 AM   Result Value Ref Range    Phosphorus 3.4 2.5 - 4.5 MG/DL     CXR:  WNL    Assessment/Plan:   Sigmoid colon cancer with metastasis  S/P sigmoid colectomy with colostomy, abdominal wall resection, partial cystectomy and fistula resection with primary repair, partial hysterectomy and left salpingectomy, and mobilization of splenic fixture    Post op anemia, transfused  Hypokalemia  Restless legs  Anxiety  Difficult pain control    PLAN:  Palliative care consult for care decisions and pain management  Oncology to see  Primary care team managing electrolytes      Care Plan discussed with:   Patient, RN    Signed By: Avinash Boucher NP     August 28, 2017
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
## 2017-08-29 NOTE — PROGRESS NOTES
PLAN:  Keep may x 10 days -- est. removal date 9/2 (MD will order)  OOB to chair as able -- PT following  Cont GI soft diet  Ostomy RN following for lessons/care  Follow labs/lytes -- supplement PRN  Pain/ nausea control PRN -- PC to assist with management of chronic pain  Hospitalist following for tachycardia/BP management     upset with [facility] eval yesterday (he was not present for eval). Also he feels that PT not working enough with Pt. Family feels that [facility] would be good for Pt, they refuse a \"nursing home\" for rehab. If 9th not an option -- will take her home with [facility] services. D/w Dr. Dawit Rueda -- Pt too lethargic to participate yesterday -- requested repeat therapy evals and then will re-eval appropriateness for [facility]  D/w Oncology NP -- family requesting Dr. Danielle Reed to discuss prognosis      ASSESSMENT:  Admit Date: 8/23/2017   POD 6 Days Post-Op  Procedure(s):  COLOSTOMY  LAPAROTOMY EXPLORATORY/ [address] / collectomy. abdominal wall resection/ partial hysterectomy/ partial cystectomy      Principal Problem:    Cancer of sigmoid colon ([facility] [address] (8/23/2017)       SUBJECTIVE:  Resting in bed. Alert today. Family at bedside. Tolerating diet -- no N/V. AF, VSS. OBJECTIVE:  Constitutional: Alert, cooperative, no acute distress; appears stated age   Visit Vitals    /79 (BP 1 Location: Right arm, BP Patient Position: Head of bed elevated (Comment degrees))    Pulse (!) 102    Temp 97.9 °F (36.6 °C)    Resp 15    Ht 5' 5\" (1.651 m)    Wt 71.7 kg (158 lb 2 oz)    SpO2 93%    BMI 26.31 kg/m2       Eyes: Sclera are clear. ENMT: No external lesions, gross hearing normal; no obvious neck masses, no ear or lip lesions  CV: RRR, S1S2, mildly tachy. Normal perfusion, pulses palpalbe  Resp: No JVD. Breathing is non-labored; no audible wheezing.  BBS clear, on RA  GI: Soft, incision clear, colostomy viable -- +soft brown stool in pouch  : May - patent, clear urine Musculoskeletal: Generalized weakness, L>R. No embolic signs or cyanosis.    Neuro: Alert, generally oriented; moves all 4 - L leg weaker  Psychiatric: Alert, cooperative      Patient Vitals for the past 24 hrs:   BP Temp Pulse Resp SpO2   08/29/17 0826 130/79 97.9 °F (36.6 °C) (!) 102 15 93 %   08/29/17 0355 132/77 97.5 °F (36.4 °C) 86 16 98 %   08/28/17 2304 130/76 97.7 °F (36.5 °C) 95 16 97 %   08/28/17 1959 152/87 97.7 °F (36.5 °C) 98 16 97 %   08/28/17 1625 - - (!) 105 - -   08/28/17 1544 147/83 97.8 °F (36.6 °C) (!) 106 16 93 %   08/28/17 1151 154/90 98.1 °F (36.7 °C) (!) 101 16 99 %     Labs:    Recent Labs      08/29/17   0613  08/28/17   0510   WBC  8.2  9.6   HGB  10.7*  10.8*   PLT  291  296   NA  138  136   K  3.9  3.3*   CL  98  98   CO2  32  34*   BUN  10  7   CREA  0.42*  0.46*   GLU  104*  114*   TBILI   --   0.4   SGOT   --   56*   ALT   --   24   AP   --   217*       YUE Zheng
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
## 2017-08-29 NOTE — WOUND CARE
Colostomy lesson with patient and daughter, sister and spouse review of ordering, emptying and changing pouch. Patient has some neuromuscular changes and is unable to have full function of left hand, self cares will be difficult. Attempted to have patient perform pouch change on model, unable to perform secondary to difficulty with left arm/ hand. Patients spouse and daughter states they feel confident in ostomy cares and will begin to perform on own and report to nurse. Will continue to monitor and teach.
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
## 2017-08-29 NOTE — PROGRESS NOTES
PM&R f/u    Palliative care consult pending. Family wanting oncology consult as well. They are confused as to why she has declined significantly since surgery. When consulted yesterday, we discussed options for moving forward including ; further chemo if oncology feels she is appropriate, home health, SNF, fact that she was unable to participate in therapies and has actually declined in therapies, thus could not justify IRC. We discussed what Palliative Care is able to assist with regarding pain mgt, especially due to fact that they feel she is overmedicated but still in pain, and what they should expect in near future. I did mention to them what the role of Hospice would be if Oncology felt she could not withstand further chemo and they chose to not pursue other treatments. I did NOT tell them that she should go the hospice route. I DID tell them to discuss prognosis with oncology because I was NOT an oncologist. Family was appreciative of my opinion and honesty. They , however, feel distraught over decisions to be made. Daughter reported, \" she was in pain and stuff but could carry on a conversation and get around before surgery and now look at her. We thought that she would get this fixed and then be back to herself and come home. We would never have agreed to surgery if we knew that she would decline like this. \"  I explained that medications could be contributing to her confusion and weakness and that we should wait a couple of days and see how she does. They appear to be under the impression that with chemo and immunotherapy, the pt would continue on and \"be there to see my daughter graduate from high school. \" (her dtg is now 11yo)    I really feel that Oncology, Dr Nedra Harrington, should be involved to help with further decision making. I am not the one to be having these discussions with them as I am the consultant for rehab prognosis and plan.  They need someone to have an open and cristal conversation with them, as multiple family members involved.     Helena Bach MD

## 2017-08-29 NOTE — PROGRESS NOTES
Patient unable to complete MRI. Sent to MRI and she started screaming and saying she was in excruciating pain, and yelling. Patient states that it is not claustrophobia, but that she has just had all she can handle today. Patient had to be removed from MRI machine and brought back to the floor. She states that she will try again in the morning.

## 2017-08-29 NOTE — PROGRESS NOTES
Hourly rounds completed during 12 hour shift. No changes noted during shift. Will continue to monitor until shift change. Family and pt would both like a consult to oncology!

## 2017-08-29 NOTE — PROGRESS NOTES
Pt was in severe pain and couldn't handle mri even after meds given. Sent back to room and notified nurse.
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
## 2017-08-30 NOTE — PROGRESS NOTES
Spoke to [facility]. Reports that pt will be having an MRI today. DC may be delayed until tomorrow. Notified Chip with HH of possible delay in discharge. .  SW available to assist as needed.

## 2017-08-30 NOTE — DISCHARGE INSTRUCTIONS
DISCHARGE SUMMARY from Nurse    The following personal items are in your possession at time of discharge:    Dental Appliances: None  Visual Aid: None     Home Medications: None  Jewelry: None  Clothing: Pants, Shirt, Undergarments, Footwear  Other Valuables: None  Personal Items Sent to Safe: none          PATIENT INSTRUCTIONS:    After general anesthesia or intravenous sedation, for 24 hours or while taking prescription Narcotics:  · Limit your activities  · Do not drive and operate hazardous machinery  · Do not make important personal or business decisions  · Do  not drink alcoholic beverages  · If you have not urinated within 8 hours after discharge, please contact your surgeon on call. Report the following to your surgeon:  · Excessive pain, swelling, redness or odor of or around the surgical area  · Temperature over 100.5  · Nausea and vomiting lasting longer than 4 hours or if unable to take medications  · Any signs of decreased circulation or nerve impairment to extremity: change in color, persistent  numbness, tingling, coldness or increase pain  · Any questions        What to do at Home:  Recommended activity: No lifting, Driving, or Strenuous exercise until cleared by Dr. Linus Victor    If you experience any of the following symptoms increased pain, fever greater than 101, nausea/vomiting, increased wound drainage, or decreased colostomy output, please follow up with Dr. Linus Victor.      *  Please give a list of your current medications to your Primary Care Provider. *  Please update this list whenever your medications are discontinued, doses are      changed, or new medications (including over-the-counter products) are added. *  Please carry medication information at all times in case of emergency situations.           These are general instructions for a healthy lifestyle:    No smoking/ No tobacco products/ Avoid exposure to second hand smoke    Surgeon General's Warning:  Quitting smoking now greatly reduces serious risk to your health. Obesity, smoking, and sedentary lifestyle greatly increases your risk for illness    A healthy diet, regular physical exercise & weight monitoring are important for maintaining a healthy lifestyle    You may be retaining fluid if you have a history of heart failure or if you experience any of the following symptoms:  Weight gain of 3 pounds or more overnight or 5 pounds in a week, increased swelling in our hands or feet or shortness of breath while lying flat in bed. Please call your doctor as soon as you notice any of these symptoms; do not wait until your next office visit. Recognize signs and symptoms of STROKE:    F-face looks uneven    A-arms unable to move or move unevenly    S-speech slurred or non-existent    T-time-call 911 as soon as signs and symptoms begin-DO NOT go       Back to bed or wait to see if you get better-TIME IS BRAIN. Warning Signs of HEART ATTACK     Call 911 if you have these symptoms:   Chest discomfort. Most heart attacks involve discomfort in the center of the chest that lasts more than a few minutes, or that goes away and comes back. It can feel like uncomfortable pressure, squeezing, fullness, or pain.  Discomfort in other areas of the upper body. Symptoms can include pain or discomfort in one or both arms, the back, neck, jaw, or stomach.  Shortness of breath with or without chest discomfort.  Other signs may include breaking out in a cold sweat, nausea, or lightheadedness. Don't wait more than five minutes to call 911 - MINUTES MATTER! Fast action can save your life. Calling 911 is almost always the fastest way to get lifesaving treatment. Emergency Medical Services staff can begin treatment when they arrive -- up to an hour sooner than if someone gets to the hospital by car. The discharge information has been reviewed with the patient. The patient verbalized understanding.     Discharge medications reviewed with the patient and appropriate educational materials and side effects teaching were provided. MD Instructions: Follow-up with Dr. Jose Luis Musa in 1 week -- you will need to call tomorrow and schedule an appointment. Your may may be removed on Saturday -- 9/2/17    Keep incision/staples clean and dry, may remain uncovered. Do not apply lotions, creams or ointments to incisions/flor. [place] [place] will be removed at follow-up appointment. Diet - as tolerated - Soft foods diet. Activity - ambulate - as tolerated - no heavy lifting >10lb. May shower - no tub baths or soaking, submerging or swimming. No driving while taking narcotics. Do not drink alcohol while taking narcotics. Resume other home medications as directed. You will need to make an appointment to see Palliative Care or Oncology for pain management. If problems or questions arise, please call our office at [phone]. Open Bowel Resection: What to Expect at [address] [place] [address] are likely to have pain that comes and goes for the next few days after bowel surgery. You may have bowel cramps, and your cut (incision) may hurt. You may also feel like you have the flu. You may have a low fever and feel tired and nauseated. This is common. You should feel better after a week and will probably be back to normal in 2 to 3 weeks. This care sheet gives you a general idea about how long it will take for you to recover. But each person recovers at a different pace. Follow the steps below to get better as quickly as possible. How can you care for yourself at home? Activity  · Rest when you feel tired. Getting enough sleep will help you recover. · Try to walk each day. Start by walking a little more than you did the day before. Bit by bit, increase the amount you walk. Walking boosts blood flow and helps prevent pneumonia and constipation.   · Avoid strenuous activities, such as biking, jogging, weight lifting, or aerobic exercise, until your doctor says it is okay. · Ask your doctor when you can drive again. · You will probably need to take 3 to 4 weeks off from work. It depends on the type of work you do and how you feel. You may need to take off 4 to 6 weeks if you lift heavy objects in your job. · You may shower 24 to 48 hours after surgery, if your doctor says it is okay. Pat the cut (incision) dry. Do not take a bath for the first 2 weeks, or until your doctor tells you it is okay. · Ask your doctor when it is okay for you to have sex. Diet  · You may not have much appetite after the surgery. But try to eat a healthy diet. Your doctor will tell you about any foods you should not eat. · Eat a low-fiber diet for several weeks after surgery. Eat many small meals throughout the day. Add high-fiber foods a little at a time. · Eat yogurt. It puts good bacteria into your colon and helps prevent diarrhea. · Try to avoid nuts, seeds, and corn for a while. They may be hard to digest.  · You may need to take vitamins that contain sodium and potassium. Ask your doctor. · Drink plenty of fluids to avoid becoming dehydrated. Medicines  · Your doctor will tell you if and when you can restart your medicines. He or she will also give you instructions about taking any new medicines. · If you take blood thinners, such as warfarin (Coumadin), clopidogrel (Plavix), or aspirin, be sure to talk to your doctor. He or she will tell you if and when to start taking those medicines again. Make sure that you understand exactly what your doctor wants you to do. · Take pain medicines exactly as directed. ¨ If the doctor gave you a prescription medicine for pain, take it as prescribed. ¨ If you are not taking a prescription pain medicine, ask your doctor if you can take an over-the-counter medicine. ¨ Do not take two or more pain medicines at the same time unless the doctor told you to. Many pain medicines have acetaminophen, which is Tylenol.  Too much acetaminophen (Tylenol) can be harmful. · If you think your pain medicine is making you sick to your stomach:  ¨ Take your medicine after meals (unless your doctor tells you not to). ¨ Ask your doctor for a different pain medicine. · If your doctor prescribed antibiotics, take them as directed. Do not stop taking them just because you feel better. You need to take the full course of antibiotics. · You may need to take some medicines in a different form. You will be told whether to crush pills or take a liquid form of the medicine. · If your doctor gives you a stool softener, take it as directed. Incision care  · If you have strips of tape on the incision, leave the tape on for a week or until it falls off. · Wash the area daily with warm, soapy water, and pat it dry. Follow-up care is a key part of your treatment and safety. Be sure to make and go to all appointments, and call your doctor if you are having problems. It's also a good idea to know your test results and keep a list of the medicines you take. When should you call for help? Call 911 anytime you think you may need emergency care. For example, call if:  · You passed out (lost consciousness). · You have sudden chest pain and shortness of breath, or you cough up blood. · You have severe pain in your belly. Call your doctor now or seek immediate medical care if:  · You are sick to your stomach and cannot drink fluids or keep them down. · You have signs of a blood clot, such as:  ¨ Pain in your calf, back of the knee, thigh, or groin. ¨ Redness and swelling in your leg or groin. · You have a lot of diarrhea that smells very bad. · You have trouble passing urine or stool, especially if you have mild pain or swelling in your lower belly. · You have signs of infection, such as:  ¨ Increased pain, swelling, warmth, or redness. ¨ Red streaks leading from the incision. ¨ Pus draining from the incision. ¨ A fever.   · You have pain that does not get better after you take pain medicine. · You have loose stitches, or your incision comes open. · You are bleeding or have new drainage from the incision. Watch closely for any changes in your health, and be sure to contact your doctor if:  · You do not have a bowel movement after taking a laxative. · You do not get better as expected. Where can you learn more? Go to http://jose-lynn.info/. Enter 360 [id number] in the search box to learn more about \"Open Bowel Resection: What to Expect at Home. \"  Current as of: August 9, 2016  Content Version: 11.3  © 4904-1064 [employer]. Care instructions adapted under license by [employer] (which disclaims liability or warranty for this information). If you have questions about a medical condition or this instruction, always ask your healthcare professional. [address] any warranty or liability for your use of this information.

## 2017-08-30 NOTE — DISCHARGE SUMMARY
[facility]  [place], [address] [facility]  [phone]     Discharge Summary     Monika Cornelius  MRN: [id number]     : 1960     Age: 64 y.o. Admit date: 2017     Discharge date:  17  Attending Physician: Dr. Aniyah Watters MD, [facility]  Primary Discharge Diagnosis:   Principal Problem:    Cancer of sigmoid colon [facility]) (2017)      Primary Operations or Procedures Performed :  Procedure(s):  COLOSTOMY  LAPAROTOMY EXPLORATORY/ [address] / collectomy. abdominal wall resection/ partial hysterectomy/ partial cystectomy       Brief History and Reason for Admission: Monika Cornelius was admitted with the following history of present illness. Monika Cornelius is a 64 y.o. female who is seen for colovesical fistula. She has sigmoid cancer with extensive metastases diagnosed last Summer, and she has been on chemotherapy since then. She was initially on Folfox with avastin, and her recent scan showed evidence of progression, she was then switched to [facility]. The last chemo was given about 3 weeks ago. She developed UTI for the last two to three weeks, and CT scan suggests colovesical fistula.      As noted, she received palliative pelvic radiation to L4 to S1 and left ileum, which was finished on 3/8/2017. She has history of diverticulitis too. Hospital Course:  She was taken to the OR on 17 by Dr. Augie Avila for  [place] with colostomy, Abdominal wall resection, Partial cystectomy with fistula resection and primary repair, Partial hysterectomy and left salpingectomy and Mobilization of splenic flexure. She was admitted to the floor for routine post-operative management. She has progressed from surgery standpoint well. She is tolerating a GI soft diet and ostomy is functioning. However, pain was severe and not controlled well. Palliative Care was consulted for recommendations and management.  They family felt she was debilitated and requested [facility] evaluation. She was very lethargic during the evaluation and was not felt appropriate for [facility]. However, the following day she was more alert after medication adjustments and repeat therapy evaluations were requested by [facility]. During the evaluations, family become upset and they felt that her L side was weaker and MRI brain was scheduled. Oncology was consulted at their request for prognosis discussions. She was unable to complete the MRI today and instead wishes to be discharged rather than re-attempt the MRI or complete the Neuro evaluation. She will be discharged today and scheduled for OP follow-up. Condition at Discharge: Stable    Discharge Medications:   Current Discharge Medication List      START taking these medications    Details   pantoprazole (PROTONIX) 40 mg tablet Take 1 Tab by mouth Daily (before breakfast). Qty: 30 Tab, Refills: 0      oxyCODONE-acetaminophen (PERCOCET 10)  mg per tablet Take 1 Tab by mouth every four (4) hours as needed. Max Daily Amount: 6 Tabs. Qty: 40 Tab, Refills: 0      oxyCODONE ER (OXYCONTIN) 20 mg ER tablet Take 1 Tab by mouth every twelve (12) hours. Max Daily Amount: 40 mg.  Qty: 30 Tab, Refills: 0      metoprolol tartrate (LOPRESSOR) 50 mg tablet Take 1 Tab by mouth every twelve (12) hours. Qty: 60 Tab, Refills: 0      magnesium hydroxide (MILK OF MAGNESIA) 400 mg/5 mL suspension Take 30 mL by mouth two (2) times a day for 30 days. Hold for liquid stool. Qty: 1800 mL, Refills: 0      enoxaparin (LOVENOX) 40 mg/0.4 mL 0.4 mL by SubCUTAneous route every twenty-four (24) hours for 21 days. Qty: 8.4 mL, Refills: 0      docusate sodium (COLACE) 100 mg capsule Take 1 Cap by mouth two (2) times a day. Hold for liquid stool  Qty: 60 Cap, Refills: 0         CONTINUE these medications which have CHANGED    Details   levoFLOXacin (LEVAQUIN) 750 mg tablet Take 1 Tab by mouth every twenty-four (24) hours for 5 days.   Qty: 5 Tab, Refills: 0         CONTINUE these medications which have NOT CHANGED    Details   ALPRAZolam (XANAX) 2 mg tablet Take 2 mg by mouth nightly as needed for Anxiety. LORazepam (ATIVAN) 1 mg tablet Take 1 Tab by mouth every four (4) hours as needed for Anxiety. Max Daily Amount: 6 mg. Qty: 120 Tab, Refills: 0    Associated Diagnoses: Malignant neoplasm of colon, unspecified part of colon ([address] Vaginal bleeding      potassium chloride (K-DUR, KLOR-CON) 20 mEq tablet Take 2 Tabs by mouth two (2) times a day. Qty: 120 Tab, Refills: 0      ondansetron hcl (ZOFRAN) 8 mg tablet Take 1 Tab by mouth every eight (8) hours as needed for Nausea. Qty: 90 Tab, Refills: 3      PARoxetine (PAXIL) 40 mg tablet Take 1 Tab by mouth daily. Indications: GENERALIZED ANXIETY DISORDER, PANIC DISORDER  Qty: 30 Tab, Refills: 2    Associated Diagnoses: Malignant neoplasm of sigmoid colon (HCC)      cyclobenzaprine (FLEXERIL) 10 mg tablet Take 1 Tab by mouth three (3) times daily (with meals). Qty: 30 Tab, Refills: 0      lidocaine-prilocaine (EMLA) topical cream Apply  to affected area as needed. Apply 1/2 inch cream to port site 90 minutes prior to access  Qty: 30 g, Refills: 0      prochlorperazine (COMPAZINE) 10 mg tablet Take 1 Tab by mouth every six (6) hours as needed. Qty: 120 Tab, Refills: 3         STOP taking these medications       HYDROmorphone (DILAUDID) 4 mg tablet Comments:   Reason for Stopping:                 Disposition/Discharge Instructions/Follow-up Care:      MD Instructions: Follow-up with Dr. Michael Montoya in 1 week -- you will need to call tomorrow and schedule an appointment. Your may may be removed on Saturday -- 9/2/17    Keep incision/staples clean and dry, may remain uncovered. Do not apply lotions, creams or ointments to incisions/staples. Traci Holloway will be removed at follow-up appointment. Diet - as tolerated - Soft foods diet. Activity - ambulate - as tolerated - no heavy lifting >10lb.   May shower - no tub baths or soaking, submerging or swimming. No driving while taking narcotics. Do not drink alcohol while taking narcotics. Resume other home medications as directed. You will need to make an appointment to see Palliative Care or Oncology for pain management. If problems or questions arise, please call our office at [phone].     Greater than 30 minutes were spent discharging the patient      Signed:  YUE Lees   8/30/2017  5:21 PM

## 2017-08-30 NOTE — PROGRESS NOTES
Problem: Nutrition Deficit  Goal: *Optimize nutritional status  Nutrition  Reason for assessment: LOS  Assessment:   Diet order(s): 8-24: NPO, 8-25: Clear liquid, 8-27: Full liquid, 8-29: GI soft  Food/Nutrition Patient History:  Hx of metastatic colon cancer, admitted for planned surgery. S/P colectomy, fistula repair and colostomy. Pt currently out of room, no family in room. Noted plan for MRI today and duiachrge to home tomorrow. Per outpt oncology RD note on 6-28-17: \"Pt reports excellent appetite/po intake, eating 3 meals/day, small snacks between meals, drinks 2 Muscle Milk shakes daily\"  Anthropometrics:Height: 5' 5\" (165.1 cm),  Weight: 71.7 kg (158 lb 2 oz), Weight Source: Standing scale (comment), Body mass index is 26.31 kg/(m^2). BMI class of overweight. Weight hx per EMR ( Based on [facility] care functionality, RD cannot know if these weight are actual versus stated):  WT / BMI 8/22/2017 8/18/2017 8/8/2017 7/24/2017   WEIGHT 150 lb 145 lb 149 lb 3.2 oz 151 lb 11.2 oz       WT / BMI 7/17/2017 6/30/2017 6/28/2017 6/27/2017   WEIGHT 148 lb 9.6 oz 160 lb 3.2 oz 159 lb 12.8 oz 162 lb 3.2 oz       WT / BMI 5/19/2017 5/17/2017 5/5/2017 5/3/2017 5/2/2017   WEIGHT 164 lb 165 lb 3.2 oz 166 lb 166 lb 12.8 oz 168 lb       WT / BMI 4/25/2017 4/21/2017 4/19/2017 4/13/2017 4/4/2017   WEIGHT 166 lb 8 oz 167 lb 168 lb 169 lb 165 lb 12.8 oz       WT / BMI 3/31/2017 3/29/2017 3/28/2017 3/17/2017   WEIGHT 168 lb 6.4 oz 171 lb 9.6 oz 172 lb 11.2 oz 173 lb 9.6 oz       WT / BMI 3/15/2017 3/8/2017 2/23/2017   WEIGHT 173 lb 6.4 oz 173 lb 174 lb 3.2 oz     ~9.2 % change in wt within 6 months c/w clinically insignificant change. Macronutrient needs:  EER:  8969-6891 kcal /day (25-30 kcal/kg ABW)  EPR:  57-74 grams protein/day (1-1.3 grams/kg IBW)  Intake/Comparative Standards:  Average intake for past 7 day(s)/2 recorded meal(s): 38%.  This potentially meets ~45% of kcal and ~60% of protein needs     Nutrition Diagnosis: Inadequate oral intake r/t decreased ability to consume sufficient oral intake as evidenced by intake as above, use of nutritional supplement PTA     Intervention:  Meals and snacks: Continue current diet.   Nutrition Supplement Therapy: Ensure high protein tid      Tae Olivares RD, LD, [facility] [phone]

## 2017-08-30 NOTE — PROGRESS NOTES
Met with pt, mother and daughter. Pt for dc home today. HH PT, OT, ST and RN for ostomy care ordered. Referral to [facility] with [facility]. RN will be able to see pt tomorrow. Pt will need ambulance transport home. SW to arrange.

## 2017-08-30 NOTE — PROGRESS NOTES
Discharge instructions and prescriptions given and reviewed with pt and family, pt very drowsy during explanation,  verbalizes understanding, pt is discharging home with catheter, it is to be removed sat by [facility], port will need to be flushed and de-accessed, primary RN to do this prior to discharge, all ostomy supplies packed for pt to take home in addition to disposable bed pads and skin prep wipes per family request, explained how to adm Lovenox injections to , pt will have [facility] to reinforce this, Mariya Grady called for transport home, states \"they are on the way\".

## 2017-08-30 NOTE — PROGRESS NOTES
SURGERY:    Called by floor RN. Pt unable to remain still for MRI today despite Ativan for sedation. Patient and family wish to go home. They do not want to try to repeat the MRI or complete the Neuro evaluation. Will discharge at this time.     Chilo Carbajal, NP-C

## 2017-08-30 NOTE — PROGRESS NOTES
Palliative Care Progress Note    Patient: Antonio Kenny MRN: [id number]  SSN: [id number]    YOB: 1960  Age: 64 y.o. Sex: female       Assessment/Plan:     Chief Complaint/Interval History: Abdominal pain, 8/10 currently     Principal Diagnosis:    · Pain, abdomen  R10.9    Additional Diagnoses:   · Altered Mental Status R41.82  · Debility, Unspecified  R53.81  · Fatigue, Lethargy  R53.83  · Counseling, Encounter for Medical Advice  Z71.9  · Encounter for Palliative Care  Z51.5    Palliative Performance Scale (PPS)  PPS: 50    Medical Decision Making:   Reviewed and summarized notes over previous 24 hours. Discussed case with appropriate providers: primary RN; Cynthia Taylor NP; PETRA Bob  Reviewed laboratory and x-ray data: CBC, CMP    Patient resting in bed, no distress noted.  and mother are at the bedside. Patient with ongoing abdominal pain, currently rates 8/10. RN gave Percocet 10mg during visit. She tolerated OxyContin 10mg well, will increase to OxyContin 20mg every 12 hours. Counseled regarding the action of long-acting opioids, instructed not to crush, break, chew, or manipulate in any way. Discussed medications for anxiety, and will resume Xanax HS prn and Ativan every 4 hours prn during the day. Patient has declined to have MRI done and she and family want to take her home with home health today. Patient says everyone is asking why she doesn't want the MRI. Counseled that we will support whatever decision she makes about the MRI, as long as she has a good understanding of our concerns and reasons for ordering it. Counseled that top two concerns are a stroke vs her cancer changing. They verbalize understanding. Advised that if her symptoms get worse at home and they desire workup, to let provider know. Also discussed concerns about her family to care for her physically.   They have large support system, and feel comfortable with caring for her ostomy, her physical debility, etc.  They do not want a follow-up appointment with oncology or palliative care at the cancer center at this time, but will call and request when they are ready. Will discuss findings with members of the interdisciplinary team.         More than 50% of this 35 minute visit was spent counseling and coordination of care as outlined above. Subjective:     Review of Systems:  A comprehensive review of systems was negative except for:   Gastrointestinal: Positive for abdominal pain. Objective:     Visit Vitals    BP (!) 137/91 (BP 1 Location: Right arm, BP Patient Position: At rest)    Pulse 96    Temp 97.4 °F (36.3 °C)    Resp 18    Ht 5' 5\" (1.651 m)    Wt 71.7 kg (158 lb 2 oz)    SpO2 94%    BMI 26.31 kg/m2       Physical Exam:    General:  Cooperative. No acute distress. Eyes:  Conjunctivae/corneas clear. Nose: Nares normal. Septum midline. Neck: Supple, symmetrical, trachea midline. Lungs:   Clear to auscultation bilaterally, unlabored. Heart:  Regular rate and rhythm, no murmur. Abdomen:   Soft, non-tender, non-distended. Ostomy with stool in pouch. Extremities: Normal, atraumatic, no cyanosis. Skin: Skin color, texture, turgor normal. No rash. Neurologic: Ongoing left sided weakness. Psych: Alert and oriented, but foggy- improved per family.      Signed By: Flory Urbano NP     August 30, 2017

## 2017-08-30 NOTE — PROGRESS NOTES
Tried patient 2 more times today and she could not handle scan. Nurse came and gave meds that did not help. Sent back and nurse notified.
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
## 2017-08-30 NOTE — PROGRESS NOTES
SPEECH PATHOLOGY NOTE:    Patient off the floor for an MRI at this time. Per notes, family wanting to take pt home with home health. Will follow.     Tatiana Lira MS, CCC-SLP

## 2017-08-30 NOTE — WOUND CARE
Colostomy review with patient and family, family verbalizes understanding, patient is confused and unable to verbalize understanding. Colostomy pouch is currently intact and does not need emptied, stoma pink budded. Patients is to go down for MRI today. Patient's daughter and spouse decline need for practicing on model, state they can change the pouch and empty the pouch, just need to practice on there patient and not going to do it before the MRI. Will continue to eric follow for ostomy cares and lessons.

## 2017-08-31 NOTE — PROGRESS NOTES
[facility]  Face to Face Encounter    Patients Name: Katerina Martinez    YOB: 1960    Ordering Physician: Parvin Pak MD    Primary Diagnosis: Cancer of sigmoid colon [facility]) [C18.7]    Date of Face to Face:   8/31/2017                                  Face to Face Encounter findings are related to primary reason for home care:   Yes.     1. I certify that the patient needs intermittent care as follows: skilled nursing care:  skilled observation/assessment, patient education, may catheter care and ostomy care education  physical therapy: strengthening, stretching/ROM, transfer training, gait/stair training, balance training and pt/caregiver education  occupational therapy:  ADL safety (ie. cooking, bathing, dressing) and pt/caregiver education  speech therapy:  cognition training and pt/caregiver education    2. I certify that this patient is homebound, that is: 1) patient requires the use of a wheelchair device, special transportation, or assistance of another to leave the home; or 2) patient's condition makes leaving the home medically contraindicated; and 3) patient has a normal inability to leave the home and leaving the home requires considerable and taxing effort. Patient may leave the home for infrequent and short duration for medical reasons, and occasional absences for non-medical reasons. Homebound status is due to the following functional limitations: Patient's ambulation limited secondary to severe pain and requires the use of an assistive device and the assistance of a caregiver for safe completion. Patient with strength and ROM deficits limiting ambulation endurance requiring the use of an assistive device and the assistance of a caregiver. Patient deemed temporarily homebound secondary to increased risk for infection when leaving home and going out into the community.   Patient currently under activity restrictions secondary to recent surgical procedure, this hinders their ability to safely leave the home. Patient with strength deficits limiting the performance of all ADL's without caregiver assistance or the use of an assistive device. 3. I certify that this patient is under my care and that I, or a nurse practitioner or [phone], or clinical nurse specialist, or certified nurse midwife, working with me, had a Face-to-Face Encounter that meets the physician Face-to-Face Encounter requirements. The following are the clinical findings from the [address] [place] [address] encounter that support the need for skilled services and is a summary of the encounter: see medical record. \    See attached progess note      CEE Archer  8/31/2017      THE FOLLOWING TO BE COMPLETED BY THE COMMUNITY PHYSICIAN:    I concur with the findings described above from the F2F encounter that this patient is homebound and in need of a skilled service.     Certifying Physician: _____________________________________      Printed Certifying Physician Name: _____________________________________    Date: _________________

## 2017-09-01 PROBLEM — D49.6 BRAIN TUMOR (HCC): Status: ACTIVE | Noted: 2017-01-01

## 2017-09-01 NOTE — ED TRIAGE NOTES
Patient arrives from EMS. Surgery 8/23 for colon resection. Since surgery reports left sided weakness. Unable to bear own body weight. 150/94 115 o2 92 . Patient has ostomy and staples in her abdomen. States that her urinary catheter was taken out todayReports 10/10 pain.
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
## 2017-09-02 NOTE — PROGRESS NOTES
Lisa Katz RN & Telma Prieto RN have performed a head to toe skin assessment. No areas of skin breakdown noted. Has intact staples from colon surgery and ostomy bag left lower abdomen.

## 2017-09-02 NOTE — PROGRESS NOTES
Problem: Nutrition Deficit  Goal: *Optimize nutritional status  Nutrition  Reason for assessment: Referral received from nursing admission Malnutrition Screening Tool for recently lost 14-23# without trying and eating poorly due to decreased appetite. Assessment:   Diet order(s): regular  Food/Nutrition Patient History:  The patient's  assists with information at bedside as the patient is sleeping. Per patient's  she has been eating very little of meals prepared at home since her surgery on 8/23 (sigmoidectomy with colostomy, abdominal wall resection, partial cystectomy with fistula resection and primary repair, partial hysterectomy and left salpingectomy and mobilization of splenic flexure), but has started drinking boost and muscle milk more (3-4/day). He states that he has observed her take bites (eating <25%) of meals at home that she typically enjoys, such as mirella bill breakfast egg sandwiches, sub sandwiches, pizza, etc.  Per patient, her appetite was poor prior to surgery as well. He does confirm that during past admissions they have brought in foods from outside for lunch and dinner d/t dislikes of those meals in the hospital.  He was not aware that we had always available options. The patient has a h/o metastatic colon cancer with failed chemotherapy treatment, with new findings of moderate vasogenic edema in the right parieto-occipital lobe of brain, suggestive for underlying tumor/mass. Plans for MRI of brain. She is receiving Reglan, decadron, D5 NS at 75ml/hr. I observed breakfast untouched. Per , she was not awake enough to eat. Anthropometrics:Height: 5' 6\" (167.6 cm),  Weight: 65.6 kg (144 lb 9.6 oz), Weight Source: Bed, Body mass index is 23.34 kg/(m^2). BMI class of normal weight.    WT / BMI 9/1/2017 8/23/2017   WEIGHT 144 lb 9.6 oz 158 lb 2 oz       WT / BMI 9/1/2016   WEIGHT 182 lb 14.4 oz   Per weights in EMR, potential 14 pound, 8.8% clinically significant weight loss within 2 weeks (since colectomy). Potential 38 pound, 20.8% clinically significant weight loss over ~1 year. Meets Criteria for Malnutrition in the context of Acute Illness/Injury   [X] Severe Malnutrition, as evidenced by:              [X] Moderate to severe loss of muscle mass              [ ] Nutritional intake of <50% of energy intake compared to estimated energy needs for > 5 days              [X] Weight loss of >2% in 1 week, >5% in 1 month, >7.5% in 3 months              [ ] Moderate to severe edema              [X] Moderate to severe loss of subcutaneous fat                         Meets Criteria for Malnutrition in the context of Chronic Illness   [X] Severe Malnutrition, as evidenced by:              [X] Severe loss of muscle mass              [X] Nutritional intake of <75% of energy intake compared to estimated energy needs for > 1 month              [X] Weight loss of >5% in 1 month, >7.5% in 3 months,  >10% in 6 months, or >20% in 12 months              [ ] Severe edema              [X] Severe loss of subcutaneous fat              [ ] Functional decline      Macronutrient needs:  EER:  8553-4010 kcal /day (25-30 kcal/kg listed BW)  EPR:  59-89 grams protein/day (1.2-1.5 grams/kg IBW)(GFR >60)  Intake/Comparative Standards: Per RD meal rounds: <25% of breakfast (bites of eggs and applesauce). NO recorded meal intakes. I expect consumption of outside foods vs foods served while inpatient. Nutrition Diagnosis: Acute/Chronic malnutrition r/t metabolic disease state, as evidenced by patient with metastatic colon cancer with new findings of brain mass, significant weight loss within 2 weeks and within 2 year noted above. Intervention:  Meals and snacks: Continue current diet. Add food preferences. Nutrition Supplement Therapy: Boost TID (chocolate)  Recommend daily KCl supplement. May benefit from MVI as well.     Encouraged the patient's  to assist with ordering meals off of the always available menu and/or bring foods in from outside. Nutrition needs at discharge: Too early to determine. Would most likely benefit from continuation of boost and/or muscle milk to prevent further weight loss.      Cristie Felty, [address], [address], [address] [place] [address], [phone]

## 2017-09-02 NOTE — PROGRESS NOTES
TRANSFER - IN REPORT:    Verbal report received from PACU nurse(name) on [place]  being received from modu) for routine progression of care      Report consisted of patients Situation, Background, Assessment and   Recommendations(SBAR). Information from the following report(s) SBAR, Procedure Summary and Recent Results was reviewed with the receiving nurse. Opportunity for questions and clarification was provided. Assessment completed upon patients arrival to unit and care assumed.

## 2017-09-02 NOTE — PROGRESS NOTES
Hospitalist Progress Note    2017  Admit Date: 2017  6:37 PM   NAME: Charlotte Cancer   :  1960   DOS:              17  MRN:  [id number]   Attending: Garry Dinero MD  PCP:  Chen Preston DO  Treatment Team: Attending Provider: Pb Edgar MD; Consulting Provider: Suma Arce MD; Utilization Review: Travon Hong RN; Surgeon: Fatemeh Rubin MD    Full Code     SUBJECTIVE:   As previously documented:       17    Charlotte Cancer  Seen in bed  Sister at bedside, the patient is  somnolent at this moment. The pt is unable to move the Lt side of her body and appears  Dry. 10+ ROS reviewed and negative except for positive in HPI.    Allergies   Allergen Reactions    Penicillin G Unknown (comments)     Daughter states patient has Pcn allergy    Tolerates Zosyn - [employer] PharmD     Current Facility-Administered Medications   Medication Dose Route Frequency    cyclobenzaprine (FLEXERIL) tablet 10 mg  10 mg Oral TID WITH MEALS    lidocaine-prilocaine (EMLA) 2.5-2.5 % cream   Topical PRN    LORazepam (ATIVAN) tablet 1 mg  1 mg Oral Q4H PRN    magnesium hydroxide (MILK OF MAGNESIA) 400 mg/5 mL oral suspension 30 mL  30 mL Oral BID    metoprolol tartrate (LOPRESSOR) tablet 50 mg  50 mg Oral Q12H    oxyCODONE ER (OxyCONTIN) tablet 20 mg  20 mg Oral Q12H    oxyCODONE-acetaminophen (PERCOCET 10)  mg per tablet 1 Tab  1 Tab Oral Q4H PRN    pantoprazole (PROTONIX) tablet 40 mg  40 mg Oral ACB    PARoxetine (PAXIL) tablet 40 mg  40 mg Oral 7am    sodium chloride (NS) flush 5-10 mL  5-10 mL IntraVENous Q8H    sodium chloride (NS) flush 5-10 mL  5-10 mL IntraVENous PRN    tuberculin injection 5 Units  5 Units IntraDERMal ONCE    acetaminophen (TYLENOL) tablet 650 mg  650 mg Oral Q4H PRN    naloxone (NARCAN) injection 0.4 mg  0.4 mg IntraVENous PRN    ondansetron (ZOFRAN) injection 4 mg  4 mg IntraVENous Q4H PRN    heparin (porcine) injection 5,000 Units  5,000 Units SubCUTAneous Q8H    morphine injection 1 mg  1 mg IntraVENous Q3H PRN    Or    morphine injection 2 mg  2 mg IntraVENous Q3H PRN    sodium chloride (NS) flush 5-10 mL  5-10 mL IntraVENous PRN    levETIRAcetam (KEPPRA) 1,000 mg in 0.9% sodium chloride IVPB  1,000 mg IntraVENous Q12H    dexamethasone (DECADRON) 4 mg/mL injection 4 mg  4 mg IntraVENous Q6H    zolpidem (AMBIEN) tablet 5 mg  5 mg Oral QHS PRN    hydrALAZINE (APRESOLINE) 20 mg/mL injection 10 mg  10 mg IntraVENous Q6H PRN         Immunization History   Administered Date(s) Administered    Influenza Vaccine (Quad) 2015    TB Skin Test (PPD) Intradermal 2017     Objective:   Patient Vitals for the past 24 hrs:   Temp Pulse Resp BP SpO2   17 0724 98.8 °F (37.1 °C) (!) 108 16 (!) 162/102 95 %   17 0450 99.5 °F (37.5 °C) (!) 127 16 (!) 171/94 94 %   17 2351 100.1 °F (37.8 °C) (!) 124 16 146/76 94 %   17 2203 97.9 °F (36.6 °C) (!) 122 - 141/79 94 %   17 - (!) 123 - 150/89 95 %   17 - (!) 115 - 146/82 97 %   17 - (!) 116 15 148/79 96 %   17 - (!) 114 - 150/77 94 %   17 - (!) 115 - 163/82 97 %   17 - (!) 115 16 154/83 96 %   17 - (!) 116 19 141/75 93 %   17 - (!) 116 22 145/76 93 %   17 - (!) 117 - 152/83 95 %   17 1842 98.7 °F (37.1 °C) (!) 117 20 149/88 95 %     Temp (24hrs), Av °F (37.2 °C), Min:97.9 °F (36.6 °C), Max:100.1 °F (37.8 °C)    Oxygen Therapy  O2 Sat (%): 95 % (17)  Pulse via Oximetry: 124 beats per minute (17)  O2 Device: Nasal cannula (17)  Oxygen Therapy  O2 Sat (%): 95 % (17)  Pulse via Oximetry: 124 beats per minute (17)  O2 Device: Nasal cannula (17)    Physical Exam:  General:         Alert, cooperative, no distress   HEENT:               NCAT. No obvious deformity. Nares normal. No drainage  Lungs:  CTABL. No wheezing/rhonchi/rales  Cardiovascular:   RRR. No m/r/g. No pedal edema b/l. +2 PT/DT pulses b/l. Abdomen:       S/nt/nd. Bowel sounds normal. .   Skin:         No rashes or lesions. Not Jaundiced  Neurologic:    AAOx3. CN II- XII grossly WNL. motor  Deficit and weakness L> R   Psychiatric:         Good mood. Normal affect. Denies any SI/HI. DIAGNOSTIC STUDIES      Data Review:   Recent Results (from the past 24 hour(s))   EKG, 12 LEAD, INITIAL    Collection Time: 09/01/17  6:44 PM   Result Value Ref Range    Ventricular Rate 117 BPM    Atrial Rate 117 BPM    P-R Interval 130 ms    QRS Duration 76 ms    Q-T Interval 306 ms    QTC Calculation (Bezet) 426 ms    Calculated P Axis 62 degrees    Calculated R Axis 64 degrees    Calculated T Axis 43 degrees    Diagnosis       !! AGE AND GENDER SPECIFIC ECG ANALYSIS !! Sinus tachycardia  Minimal voltage criteria for LVH, may be normal variant  ST abnormality, possible digitalis effect  Abnormal ECG  When compared with ECG of 27-AUG-2017 07:18,  No significant change was found     CBC WITH AUTOMATED DIFF    Collection Time: 09/01/17  7:04 PM   Result Value Ref Range    WBC 12.4 (H) 4.3 - 11.1 K/uL    RBC 4.49 4.05 - 5.25 M/uL    HGB 11.5 (L) 11.7 - 15.4 g/dL    HCT 35.8 35.8 - 46.3 %    MCV 79.7 79.6 - 97.8 FL    MCH 25.6 (L) 26.1 - 32.9 PG    MCHC 32.1 31.4 - 35.0 g/dL    RDW 19.9 (H) 11.9 - 14.6 %    PLATELET 843 838 - 718 K/uL    MPV 8.9 (L) 10.8 - 14.1 FL    DF AUTOMATED      NEUTROPHILS 88 (H) 43 - 78 %    LYMPHOCYTES 6 (L) 13 - 44 %    MONOCYTES 5 4.0 - 12.0 %    EOSINOPHILS 1 0.5 - 7.8 %    BASOPHILS 0 0.0 - 2.0 %    IMMATURE GRANULOCYTES 0.5 0.0 - 5.0 %    ABS. NEUTROPHILS 10.8 (H) 1.7 - 8.2 K/UL    ABS. LYMPHOCYTES 0.7 0.5 - 4.6 K/UL    ABS. MONOCYTES 0.7 0.1 - 1.3 K/UL    ABS. EOSINOPHILS 0.2 0.0 - 0.8 K/UL    ABS. BASOPHILS 0.0 0.0 - 0.2 K/UL    ABS. IMM.  GRANS. 0.1 0.0 - 0.5 K/UL   METABOLIC PANEL, COMPREHENSIVE    Collection Time: 09/01/17 7:04 PM   Result Value Ref Range    Sodium 137 136 - 145 mmol/L    Potassium 3.5 3.5 - 5.1 mmol/L    Chloride 95 (L) 98 - 107 mmol/L    CO2 33 (H) 21 - 32 mmol/L    Anion gap 9 7 - 16 mmol/L    Glucose 121 (H) 65 - 100 mg/dL    BUN 13 6 - 23 MG/DL    Creatinine 0.57 (L) 0.6 - 1.0 MG/DL    GFR est AA >60 >60 ml/min/1.73m2    GFR est non-AA >60 >60 ml/min/1.73m2    Calcium 10.4 8.3 - 10.4 MG/DL    Bilirubin, total 0.2 0.2 - 1.1 MG/DL    ALT (SGPT) 13 12 - 65 U/L    AST (SGOT) 19 15 - 37 U/L    Alk. phosphatase 178 (H) 50 - 136 U/L    Protein, total 7.1 6.3 - 8.2 g/dL    Albumin 1.8 (L) 3.5 - 5.0 g/dL    Globulin 5.3 (H) 2.3 - 3.5 g/dL    A-G Ratio 0.3 (L) 1.2 - 3.5     PROTHROMBIN TIME + INR    Collection Time: 09/01/17  7:15 PM   Result Value Ref Range    Prothrombin time 12.0 9.6 - 12.0 sec    INR 1.1 0.9 - 1.2     INFLUENZA A & B AG (RAPID TEST)    Collection Time: 09/01/17 10:49 PM   Result Value Ref Range    Influenza A Ag NEGATIVE  NEG      Influenza B Ag NEGATIVE  NEG     MRSA SCREEN - PCR (NASAL)    Collection Time: 09/02/17  1:30 AM   Result Value Ref Range    Special Requests: NO SPECIAL REQUESTS      Culture result:        MRSA target DNA is not detected (presumptive not colonized with MRSA)   CBC WITH AUTOMATED DIFF    Collection Time: 09/02/17  4:43 AM   Result Value Ref Range    WBC 12.5 (H) 4.3 - 11.1 K/uL    RBC 4.38 4.05 - 5.25 M/uL    HGB 11.2 (L) 11.7 - 15.4 g/dL    HCT 34.6 (L) 35.8 - 46.3 %    MCV 79.0 (L) 79.6 - 97.8 FL    MCH 25.6 (L) 26.1 - 32.9 PG    MCHC 32.4 31.4 - 35.0 g/dL    RDW 19.9 (H) 11.9 - 14.6 %    PLATELET 470 030 - 260 K/uL    MPV 8.9 (L) 10.8 - 14.1 FL    DF AUTOMATED      NEUTROPHILS 96 (H) 43 - 78 %    LYMPHOCYTES 3 (L) 13 - 44 %    MONOCYTES 1 (L) 4.0 - 12.0 %    EOSINOPHILS 0 (L) 0.5 - 7.8 %    BASOPHILS 0 0.0 - 2.0 %    IMMATURE GRANULOCYTES 0.5 0.0 - 5.0 %    ABS. NEUTROPHILS 11.9 (H) 1.7 - 8.2 K/UL    ABS. LYMPHOCYTES 0.4 (L) 0.5 - 4.6 K/UL    ABS.  MONOCYTES 0.2 0.1 - 1.3 K/UL    ABS. EOSINOPHILS 0.0 0.0 - 0.8 K/UL    ABS. BASOPHILS 0.0 0.0 - 0.2 K/UL    ABS. IMM. GRANS. 0.1 0.0 - 0.5 K/UL   METABOLIC PANEL, BASIC    Collection Time: 09/02/17  4:43 AM   Result Value Ref Range    Sodium 137 136 - 145 mmol/L    Potassium 3.4 (L) 3.5 - 5.1 mmol/L    Chloride 97 (L) 98 - 107 mmol/L    CO2 32 21 - 32 mmol/L    Anion gap 8 7 - 16 mmol/L    Glucose 142 (H) 65 - 100 mg/dL    BUN 10 6 - 23 MG/DL    Creatinine 0.45 (L) 0.6 - 1.0 MG/DL    GFR est AA >60 >60 ml/min/1.73m2    GFR est non-AA >60 >60 ml/min/1.73m2    Calcium 10.2 8.3 - 10.4 MG/DL   MAGNESIUM    Collection Time: 09/02/17  4:43 AM   Result Value Ref Range    Magnesium 1.9 1.8 - 2.4 mg/dL   PHOSPHORUS    Collection Time: 09/02/17  4:43 AM   Result Value Ref Range    Phosphorus 4.1 2.5 - 4.5 MG/DL       All Micro Results     Procedure Component Value Units Date/Time    MRSA SCREEN - PCR (NASAL) [[id number]] Collected:  09/02/17 0130    Order Status:  Completed Specimen:  Nasal from Nares Updated:  09/02/17 0450     Special Requests: NO SPECIAL REQUESTS        Culture result:         MRSA target DNA is not detected (presumptive not colonized with MRSA)    INFLUENZA A & B AG (RAPID TEST) [[id number]] Collected:  09/01/17 5579    Order Status:  Completed Specimen:  Nasopharyngeal from Nasal washing Updated:  09/01/17 8455     Influenza A Ag NEGATIVE          NEGATIVE FOR THE PRESENCE OF INFLUENZA A ANTIGEN  INFECTION DUE TO INFLUENZA A CANNOT BE RULED OUT. BECAUSE THE ANTIGEN PRESENT IN THE SAMPLE MAY BE BELOW  THE DETECTION LIMIT OF THE TEST. A NEGATIVE TEST IS PRESUMPTIVE AND IT IS RECOMMENDED THAT THESE RESULTS BE CONFIRMED BY VIRAL CULTURE OR AN FDA-CLEARED INFLUENZA A AND B MOLECULAR ASSAY. Influenza B Ag NEGATIVE          NEGATIVE FOR THE PRESENCE OF INFLUENZA B ANTIGEN  INFECTION DUE TO INFLUENZA B CANNOT BE RULED OUT. BECAUSE THE ANTIGEN PRESENT IN THE SAMPLE MAY BE BELOW  THE DETECTION LIMIT OF THE TEST.   A NEGATIVE TEST IS PRESUMPTIVE AND IT IS RECOMMENDED THAT THESE RESULTS BE CONFIRMED BY VIRAL CULTURE OR AN FDA-CLEARED INFLUENZA A AND B MOLECULAR ASSAY. Imaging /Procedures /Studies:    CXR Results  (Last 48 hours)               09/01/17 1936  XR CHEST PORT Final result    Impression:  IMPRESSION: No acute findings in the chest           Narrative:  Chest X-ray       INDICATION:   Altered mental status, CVA       A portable AP view of the chest was obtained. FINDINGS: The lungs are clear. There are no infiltrates or effusions. The heart   size is normal.  Vascular port is present. CT Results  (Last 48 hours)               09/01/17 2053  CT HEAD WO CONT Final result    Impression:  IMPRESSION:       1. Moderate vasogenic edema in the right parieto-occipital lobe, suggestive of   underlying mass/tumor. Contrast-enhanced brain MRI is recommended to better   evaluate/characterize. 2. No acute intracranial hemorrhage or substantial midline shift. Narrative:  Noncontrast CT of the brain. COMPARISON: none       INDICATION: Left-sided weakness. History of recent colon resection. History of   sigmoid cancer. TECHNIQUE: Contiguous axial images were obtained from the skull base through the   vertex without IV contrast. Radiation dose reduction techniques were used for   this study:  Our CT scanners use one or all of the following: Automated exposure   control, adjustment of the mA and/or kVp according to patient's size, iterative   reconstruction. FINDINGS:       There is moderate vasogenic edema in the right parieto-occipital lobe. There is   slight mass effect upon the right lateral ventricle. No substantial midline   shift. No hydrocephalus. No extra-axial fluid collection. Cerebellum and   brainstem are grossly unremarkable. There is no evidence of acute intracranial hemorrhage. The visualized orbits and the globes are intact.  Visualized paranasal sinuses   and the mastoid air cells are aerated. Surrounding bones are intact. Ct Head Wo Cont    Result Date: 9/1/2017  IMPRESSION: 1. Moderate vasogenic edema in the right parieto-occipital lobe, suggestive of underlying mass/tumor. Contrast-enhanced brain MRI is recommended to better evaluate/characterize. 2. No acute intracranial hemorrhage or substantial midline shift. Xr Chest Port    Result Date: 9/1/2017  IMPRESSION: No acute findings in the chest     No results found for this visit on 09/01/17. Labs and Studies from previous 24 hours have been personally reviewed by myself Regina Problems    Diagnosis Date Noted    Brain tumor [facility]) 09/01/2017     Hospital Problems as of 9/2/2017  Date Reviewed: 8/23/2017          Codes Class Noted - Resolved POA    Brain tumor [facility]) ICD-10-CM: D49.6  ICD-9-CM: 239.6  9/1/2017 - Present Unknown              Plan:  Colon Cancer    Post  Resection records from [facility] reviewed pt failed chemotherapy  Post  Surgery  By  Dr Sharath Ortega    Cerebral edema  Concerning for metastasis brain tumor  Colon cancer mets( rare)  versus a  Secondary cancer   Will   Try to obtain MRI with mild sedation   Oncology consulted  Ativan 2 mg oncall for MRI     Motor Weakness   Due to  Above   Cont  Decadron     Dehydration / tachycardia   Start  IV fluids d5ns @70ml /hr     Moderate protein deficiency     Hyperglycemia from steroids   Expected elevation  Will start insulin  To cover        DVT Prophylaxis:  CODE Status:   Plan of Care Discussed with: patient.  Care team.  Medical Risk:  Disposition:    Mare Diaz MD  09/02/17
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
## 2017-09-02 NOTE — ANESTHESIA POSTPROCEDURE EVALUATION
Post-Anesthesia Evaluation and Assessment    Patient: Prisca Herrera MRN: [id number]  SSN: [id number]    YOB: 1960  Age: 64 y.o. Sex: female       Cardiovascular Function/Vital Signs  Visit Vitals    /84    Pulse 93    Temp 36.6 °C (97.9 °F)    Resp 13    Ht 5' 6\" (1.676 m)    Wt 65.6 kg (144 lb 9.6 oz)    SpO2 98%    Breastfeeding No    BMI 23.34 kg/m2       Patient is status post No value filed. anesthesia for Procedure(s):  MRI ANESTHESIA. Nausea/Vomiting: None    Postoperative hydration reviewed and adequate. Pain:  Pain Scale 1: Visual (09/02/17 1441)  Pain Intensity 1: 0 (09/02/17 1441)   Managed    Neurological Status:   Neuro (WDL): Exceptions to WDL (09/02/17 1441)  Neuro  Neurologic State: Sleeping (09/02/17 1441)   At baseline    Mental Status and Level of Consciousness: Arousable    Pulmonary Status:   O2 Device: Nasal cannula (09/02/17 1441)   Adequate oxygenation and airway patent    Complications related to anesthesia: None    Post-anesthesia assessment completed.  No concerns    Signed By: Jyothi Friedman MD     September 2, 2017

## 2017-09-02 NOTE — PROGRESS NOTES
TRANSFER - IN REPORT:    Verbal report received from Julius Jackson RN(name) on [facility]  being received from emergency(unit) for routine progression of care      Report consisted of patients Situation, Background, Assessment and   Recommendations(SBAR). Information from the following report(s) SBAR, ED Summary and Recent Results was reviewed with the receiving nurse. Opportunity for questions and clarification was provided. Assessment completed upon patients arrival to unit and care assumed.

## 2017-09-02 NOTE — PROGRESS NOTES
Patient was sedated for MRI today and was in recovery for sedation upon PT entry this afternoon. PT evaluation will be attempted tomorrow per patient toleration and medical status. Jann Aguayo, PT, DPT, OCS.
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
## 2017-09-02 NOTE — ANESTHESIA PREPROCEDURE EVALUATION
Anesthetic History   No history of anesthetic complications            Review of Systems / Medical History  Patient summary reviewed and pertinent labs reviewed    Pulmonary  Within defined limits                 Neuro/Psych         Psychiatric history (Anxiety/Panic attacks)    Comments: Left hemiparesis persistant over last 2 weeks since colectomy, possible parietal lobe mass, for mri.  Cardiovascular    Hypertension: well controlled              Exercise tolerance: <4 METS     GI/Hepatic/Renal  Within defined limits              Endo/Other        Cancer (Metastatic colon CA)     Other Findings              Physical Exam    Airway  Mallampati: II  TM Distance: > 6 cm  Neck ROM: normal range of motion   Mouth opening: Normal     Cardiovascular    Rhythm: regular  Rate: normal         Dental    Dentition: Edentulous     Pulmonary  Breath sounds clear to auscultation               Abdominal         Other Findings            Anesthetic Plan    ASA: 3, emergent  Anesthesia type: general          Induction: RSI and Intravenous  Anesthetic plan and risks discussed with: Patient and Spouse      Emergent mri for left hemiparesis, rsi due to npo status, patient took her metoprolol this am.

## 2017-09-02 NOTE — PROGRESS NOTES
END OF SHIFT NOTE:    Intake/Output  09/02 0701 - 09/02 1900  In: 100 [I.V.:100]  Out: 0    Voiding: YES  Catheter: NO  Drain:   Colostomy 08/23/17 Left;Mid Abdomen (Active)               Stool:  0 occurrences. Emesis:  0 occurrences. VITAL SIGNS  Patient Vitals for the past 12 hrs:   Temp Pulse Resp BP SpO2   09/02/17 1655 - - - 139/78 -   09/02/17 1611 98.2 °F (36.8 °C) (!) 107 20 170/88 95 %   09/02/17 1544 - 91 25 156/89 98 %   09/02/17 1539 - 92 13 (!) 155/92 99 %   09/02/17 1534 97.8 °F (36.6 °C) 91 14 154/89 98 %   09/02/17 1529 - 92 14 162/87 98 %   09/02/17 1524 - 91 13 153/87 99 %   09/02/17 1518 - 93 18 170/90 99 %   09/02/17 1514 - 93 10 162/85 97 %   09/02/17 1508 - 100 16 (!) 158/100 91 %   09/02/17 1503 - 92 10 145/82 96 %   09/02/17 1458 - 91 12 144/80 98 %   09/02/17 1454 - 93 9 157/89 98 %   09/02/17 1449 - 93 13 162/84 98 %   09/02/17 1444 - 94 11 156/84 98 %   09/02/17 1441 97.9 °F (36.6 °C) 100 18 (!) 170/91 99 %   09/02/17 1440 - 98 10 (!) 170/91 98 %   09/02/17 1117 98.4 °F (36.9 °C) (!) 104 16 (!) 149/91 95 %   09/02/17 0724 98.8 °F (37.1 °C) (!) 108 16 (!) 162/102 95 %       Pain Assessment  Pain 1  Pain Scale 1: Visual (09/02/17 1718)  Pain Intensity 1: 0 (09/02/17 1718)  Patient Stated Pain Goal: 3 (09/02/17 0458)  Pain Reassessment 1: Yes (09/02/17 1718)  Pain Onset 1: since surgery (09/02/17 0458)  Pain Location 1: Back (09/02/17 1627)  Pain Orientation 1: Left;Right (09/02/17 0458)  Pain Description 1: Aching (09/02/17 1627)  Pain Intervention(s) 1: Medication (see MAR) (09/02/17 1627)    Ambulating  No    Additional Information: pt intermittenly confused, but alert to person and time. Purewick placed due to extreme pain when turning to change brief. Had MRI under anesthesia today. Still no output from colostomy. Staples removed today, skin aproximated. Shift report will be given to oncoming nurse at the bedside.     Deanna Cardenas RN

## 2017-09-03 NOTE — PROGRESS NOTES
PLAN:  Care Management per Hospitalist  Post op abd wound - Steri Strips and cover with gauze  MOM 30cc bid      ASSESSMENT:  Admit Date: 9/1/2017   1 Day Post-Op  Procedure(s):  MRI ANESTHESIA    Active Problems:    Brain tumor ([address] (9/1/2017)       SUBJECTIVE:  Pt awake in bed. Feeling better today. Family at bedside. AF, NAD. OBJECTIVE:  Constitutional: Alert oriented cooperative patient in no acute distress; appears stated age   Visit Vitals    BP (!) 159/94 (BP 1 Location: Left arm, BP Patient Position: At rest)    Pulse 87    Temp 97.5 °F (36.4 °C)    Resp 18    Ht 5' 6\" (1.676 m)    Wt 141 lb 9.6 oz (64.2 kg)    SpO2 97%    Breastfeeding No    BMI 22.85 kg/m2     Eyes:Sclera are clear. ENMT: no external lesions gross hearing normal; no obvious neck masses, no ear or lip lesions  CV: RRR. Normal perfusion  Resp: No JVD. Breathing is  non-labored; no audible wheezing. GI: soft, non-distended, post op abd wound with steri strips at base. Dime size open area at top of incision. Steri strips applied and covered with gauze. Musculoskeletal: unremarkable with normal function. No embolic signs or cyanosis.    Neuro:  Oriented; moves all 4; no focal deficits  Psychiatric: normal affect and mood, no memory impairment      Patient Vitals for the past 24 hrs:   BP Temp Pulse Resp SpO2 Weight   09/03/17 1511 (!) 159/94 97.5 °F (36.4 °C) 87 18 97 % -   09/03/17 1127 158/85 97.5 °F (36.4 °C) 84 18 97 % -   09/03/17 0726 155/88 97.8 °F (36.6 °C) (!) 112 18 97 % -   09/03/17 0552 137/76 - 98 - - -   09/03/17 0305 172/89 97.9 °F (36.6 °C) 90 18 97 % -   09/03/17 0022 - - - - - 141 lb 9.6 oz (64.2 kg)   09/03/17 0021 147/83 97.5 °F (36.4 °C) 86 20 93 % -   09/02/17 1935 150/85 97.4 °F (36.3 °C) 90 20 99 % -   09/02/17 1655 139/78 - - - - -   09/02/17 1611 170/88 98.2 °F (36.8 °C) (!) 107 20 95 % -     Labs:  Recent Labs      09/03/17   0321   09/01/17   1915  09/01/17   1904   WBC  7.7   < >   --   12.4* HGB  10.6*   < >   --   11.5*   PLT  348   < >   --   351   NA  143   < >   --   137   K  3.3*   < >   --   3.5   CL  103   < >   --   95*   CO2  33*   < >   --   33*   BUN  15   < >   --   13   CREA  0.46*   < >   --   0.57*   GLU  135*   < >   --   121*   PTP   --    --   12.0   --    INR   --    --   1.1   --    TBILI   --    --    --   0.2   SGOT   --    --    --   19   ALT   --    --    --   13   AP   --    --    --   178*    < > = values in this interval not displayed. SHMUEL Siddiqui-BC    The patient was seen in conjunction with Dr. Augie Avila who independently evaluated the patient, reviewed the chart and agreed with the assessment and plan.

## 2017-09-03 NOTE — PROGRESS NOTES
END OF SHIFT NOTE:    Intake/Output  09/02 1901 - 09/03 0700  In: 6840 [I.V.:1420]  Out: -    Voiding: YES  Catheter: YES  Drain:   Colostomy 08/23/17 Left;Mid Abdomen (Active)               Stool:  0 occurrences. Emesis:  0 occurrences. VITAL SIGNS  Patient Vitals for the past 12 hrs:   Temp Pulse Resp BP SpO2   09/03/17 0552 - 98 - 137/76 -   09/03/17 0305 97.9 °F (36.6 °C) 90 18 172/89 97 %   09/03/17 0021 97.5 °F (36.4 °C) 86 20 147/83 93 %   09/02/17 1935 97.4 °F (36.3 °C) 90 20 150/85 99 %       Pain Assessment  Pain 1  Pain Scale 1: Numeric (0 - 10) (09/03/17 0300)  Pain Intensity 1: 0 (09/03/17 0300)  Patient Stated Pain Goal: 0 (09/03/17 0300)  Pain Reassessment 1: Yes (09/03/17 0300)  Pain Onset 1: since surgery (09/02/17 0458)  Pain Location 1: Abdomen (09/02/17 2236)  Pain Orientation 1: Left;Right (09/02/17 0458)  Pain Description 1: Aching;Constant (09/02/17 2236)  Pain Intervention(s) 1: Medication (see MAR) (09/02/17 2236)    Ambulating  No    Additional Information: Pt has external cath. She has some confusion. Pt incision dehisced overnight. Incision re-enforced with steri-strips 4x4's and an abd pad. Pt refused to take muscle relaxer. Daughter wants pt to take less pain medication so that pt is more alert for doctors. Pt rested well overnight overall. Shift report given to Ashish Monge RN oncoming nurse at the bedside.     Iggy Samaniego RN

## 2017-09-03 NOTE — PROGRESS NOTES
Called to the room by pts daughter about her mom's incision bleeding. Upon entering the room, I noticed pts incision had dehisced 4 1/2 cm at the bottom of the incision. Wound cleansed with normal saline and 4 X 4's, wound approximated, steri-strips applied, re-enforced with 4x4's and abd pad. Pain medication given. Call light with in reach, bed low and locked, family at the bedside, no concerns voiced at this time. Will continue to monitor.

## 2017-09-03 NOTE — PROGRESS NOTES
Problem: Falls - Risk of  Goal: *Absence of Falls  Document Ab Fall Risk and appropriate interventions in the flowsheet.    Outcome: Progressing Towards Goal  Fall Risk Interventions:              Medication Interventions: Bed/chair exit alarm, Patient to call before getting OOB, Teach patient to arise slowly     Elimination Interventions: Call light in reach, Bed/chair exit alarm     History of Falls Interventions: Bed/chair exit alarm, Room close to nurse's station

## 2017-09-03 NOTE — PROGRESS NOTES
[facility] Hematology & Oncology        Inpatient Hematology / Oncology Progress Note      Admission Date: 2017  6:37 PM  Reason for Admission/Hospital Course: Brain tumor [facility])  . 24 Hour Events:  Much better today  Alert and oriented   at bedside  MRI completed      ROS:  Constitutional:  negative for fever, chills, weakness, malaise, fatigue. CV: negative for chest pain, palpitations, edema. Respiratory: negative for dyspnea, cough, wheezing. GI: negative for nausea, abdominal pain, diarrhea. 10 point review of systems is otherwise negative with the exception of the elements mentioned above in the HPI. Allergies   Allergen Reactions    Penicillin G Unknown (comments)     Daughter states patient has Pcn allergy    Tolerates Jack Harrison, PharmD       OBJECTIVE:  Patient Vitals for the past 8 hrs:   BP Temp Pulse Resp SpO2   17 1127 158/85 97.5 °F (36.4 °C) 84 18 97 %   17 0726 155/88 97.8 °F (36.6 °C) (!) 112 18 97 %   17 0552 137/76 - 98 - -     Temp (24hrs), Av.8 °F (36.6 °C), Min:97.4 °F (36.3 °C), Max:98.2 °F (36.8 °C)         Physical Exam:  Constitutional: Well developed, well nourished female in no acute distress, sitting comfortably in the hospital bed. HEENT: Normocephalic and atraumatic. Oropharynx is clear, mucous membranes are moist.  Extraocular muscles are intact. Sclerae anicteric. Skin Warm and dry. No bruising and no rash noted. No erythema. No pallor. Abdominal incisions dehiscing area at top approximately 2 cm and area at bottom of incision. No bleeding   Respiratory Lungs are clear to auscultation bilaterally without wheezes, rales or rhonchi, normal air exchange without accessory muscle use. CVS Normal rate, regular rhythm and normal S1 and S2. No murmurs, gallops, or rubs. Abdomen Soft, nontender and nondistended, normoactive bowel sounds. No palpable mass. No hepatosplenomegaly.    Neuro Grossly nonfocal with no obvious sensory or motor deficits. MSK Normal range of motion in general.  No edema and no tenderness. Psych Appropriate mood and affect.         Labs:    Recent Labs      09/03/17   0321 09/02/17 0443 09/01/17   1904   WBC  7.7  12.5*  12.4*   RBC  4.15  4.38  4.49   HGB  10.6*  11.2*  11.5*   HCT  33.2*  34.6*  35.8   MCV  80.0  79.0*  79.7   MCH  25.5*  25.6*  25.6*   MCHC  31.9  32.4  32.1   RDW  19.7*  19.9*  19.9*   PLT  348  345  351   GRANS  92*  96*  88*   LYMPH  5*  3*  6*   MONOS  2*  1*  5   EOS  0*  0*  1   BASOS  0  0  0   IG  0.7  0.5  0.5   DF  AUTOMATED  AUTOMATED  AUTOMATED   ANEU  7.0  11.9*  10.8*   ABL  0.4*  0.4*  0.7   ABM  0.2  0.2  0.7   SUN  0.0  0.0  0.2   ABB  0.0  0.0  0.0   AIG  0.1  0.1  0.1      Recent Labs      09/03/17 0321 09/02/17 0443 09/01/17   1904   NA  143  137  137   K  3.3*  3.4*  3.5   CL  103  97*  95*   CO2  33*  32  33*   AGAP  7  8  9   GLU  135*  142*  121*   BUN  15  10  13   CREA  0.46*  0.45*  0.57*   GFRAA  >60  >60  >60   GFRNA  >60  >60  >60   CA  10.1  10.2  10.4   SGOT   --    --   19   AP   --    --   178*   TP   --    --   7.1   ALB   --    --   1.8*   GLOB   --    --   5.3*   AGRAT   --    --   0.3*   MG   --   1.9   --    PHOS   --   4.1   --          Imaging:      ASSESSMENT:    Problem List  Date Reviewed: 9/2/2017          Codes Class Noted    Brain tumor ([facility] [address] ICD-10-CM: D49.6  ICD-9-CM: 239.6  9/1/2017        Cancer of sigmoid colon (HCC) ICD-10-CM: C18.7  ICD-9-CM: 153.3  8/23/2017        Intractable pain ICD-10-CM: R52  ICD-9-CM: 780.96  8/14/2017        Colovesical fistula ICD-10-CM: N32.1  ICD-9-CM: 596.1  7/24/2017        UTI (urinary tract infection) ICD-10-CM: N39.0  ICD-9-CM: 599.0  7/15/2017        Difficulty coping with disease ICD-10-CM: R45.89  ICD-9-CM: 799.29  2/23/2017        Sick ICD-10-CM: L11  ICD-9-CM: 799.9  2/21/2017        Fever and chills ICD-10-CM: R50.9  ICD-9-CM: 780.60  2/21/2017        Pain ICD-10-CM: R52  ICD-9-CM: 780.96  2/21/2017        Tachycardia ICD-10-CM: R00.0  ICD-9-CM: 785.0  11/15/2016        Hypokalemia ICD-10-CM: E87.6  ICD-9-CM: 276.8  11/15/2016        Anemia associated with chemotherapy ICD-10-CM: D64.81, T45.1X5A  ICD-9-CM: 285.3, E933.1  11/15/2016        Malignant neoplasm of colon ([facility] [address] ICD-10-CM: C18.9  ICD-9-CM: 153.9  9/26/2016        Lymphadenopathy ICD-10-CM: R59.1  ICD-9-CM: 785.6  9/9/2016                PLAN:    CT head shows moderate vasogenic edema in the right parieto-occipital lobe, suggestive of underlying mass/tumor. We will consult Rad. Onc on Tuesday. Primary is making arrangements for possible sedated MRI. Agree with steroids ans Keppra. Once MRI is completed we can make further suggestions. We will follow along loosely for results. 9/3 Patient is much improved today following steroids. She is alert and oriented using all extremities without difficulty. MRI Enhancing right occipital mass with significant surrounding vasogenic edema. Most likely a metastatic lesion given history. Consult with Rad/Onc on Tuesday.      Lab studies and imaging studies were personally reviewed. Pertinent old records were reviewed.      Thank you for allowing us to participate in the care of Ms. [address], NP   [facility] Hematology & Oncology  [address] [place] [address]  Office : [phone]  Fax : [phone]       Attending Addendum:  I personally evaluated the patient with Solitario Ashley NISMAEL,  and agree with the assessment, findings and plan as documented. Appears stable, heart regular without murmur, lungs clear, abdomen benign. Clinically improved on steroids. Solitary lesion on MRI: will get rad-onc involved tuesday.                Vick Payan MD  [address]  Office : [phone]  Fax : [phone]

## 2017-09-03 NOTE — PROGRESS NOTES
[facility] - Family at bedside.    Care Management per Hospitalist  MRI of Brain today  DC abd Staples Gayleen Boeck, FNP-BC

## 2017-09-03 NOTE — PROGRESS NOTES
Hospitalist Progress Note    9/3/2017  Admit Date: 2017  6:37 PM   NAME: Dalton Bolden   :  1960   DOS:              17  MRN:  [id number]   Attending: Yenifer Oconnor MD  PCP:  Jeanna Butts DO  Treatment Team: Attending Provider: Rainer Ivy MD; Consulting Provider: Luba Richey MD; Utilization Review: Anna Marie Nazario RN; Surgeon: Chidi Jeffries MD    Full Code     SUBJECTIVE:   As previously documented:       17    Dalton Bolden  Seen in bed  Sister daughter at bedside pt has responded really well to the steroids treatment  And is now able to  Move her left extremity. She is more alert  Today answering question and  Following commands. 10+ ROS reviewed and negative except for positive in HPI.    Allergies   Allergen Reactions    Penicillin G Unknown (comments)     Daughter states patient has Pcn allergy    Tolerates Jack Miller PharmD     Current Facility-Administered Medications   Medication Dose Route Frequency    cyclobenzaprine (FLEXERIL) tablet 10 mg  10 mg Oral TID WITH MEALS    lidocaine-prilocaine (EMLA) 2.5-2.5 % cream   Topical PRN    LORazepam (ATIVAN) tablet 1 mg  1 mg Oral Q4H PRN    magnesium hydroxide (MILK OF MAGNESIA) 400 mg/5 mL oral suspension 30 mL  30 mL Oral BID    metoprolol tartrate (LOPRESSOR) tablet 50 mg  50 mg Oral Q12H    oxyCODONE ER (OxyCONTIN) tablet 20 mg  20 mg Oral Q12H    oxyCODONE-acetaminophen (PERCOCET 10)  mg per tablet 1 Tab  1 Tab Oral Q4H PRN    pantoprazole (PROTONIX) tablet 40 mg  40 mg Oral ACB    PARoxetine (PAXIL) tablet 40 mg  40 mg Oral 7am    sodium chloride (NS) flush 5-10 mL  5-10 mL IntraVENous Q8H    sodium chloride (NS) flush 5-10 mL  5-10 mL IntraVENous PRN    acetaminophen (TYLENOL) tablet 650 mg  650 mg Oral Q4H PRN    naloxone (NARCAN) injection 0.4 mg  0.4 mg IntraVENous PRN    ondansetron (ZOFRAN) injection 4 mg  4 mg IntraVENous Q4H PRN    heparin (porcine) injection 5,000 Units  5,000 Units SubCUTAneous Q8H    morphine injection 1 mg  1 mg IntraVENous Q3H PRN    Or    morphine injection 2 mg  2 mg IntraVENous Q3H PRN    dextrose 5% and 0.9% NaCl infusion  75 mL/hr IntraVENous CONTINUOUS    insulin lispro (HUMALOG) injection   SubCUTAneous Q4H    sodium chloride (NS) flush 5-10 mL  5-10 mL IntraVENous PRN    levETIRAcetam (KEPPRA) 1,000 mg in 0.9% sodium chloride IVPB  1,000 mg IntraVENous Q12H    dexamethasone (DECADRON) 4 mg/mL injection 4 mg  4 mg IntraVENous Q6H    zolpidem (AMBIEN) tablet 5 mg  5 mg Oral QHS PRN    hydrALAZINE (APRESOLINE) 20 mg/mL injection 10 mg  10 mg IntraVENous Q6H PRN         Immunization History   Administered Date(s) Administered    Influenza Vaccine (Quad) 2015    TB Skin Test (PPD) Intradermal 2017     Objective:     Patient Vitals for the past 24 hrs:   Temp Pulse Resp BP SpO2   17 0552 - 98 - 137/76 -   17 0305 97.9 °F (36.6 °C) 90 18 172/89 97 %   17 0021 97.5 °F (36.4 °C) 86 20 147/83 93 %   17 1935 97.4 °F (36.3 °C) 90 20 150/85 99 %   17 1655 - - - 139/78 -   17 1611 98.2 °F (36.8 °C) (!) 107 20 170/88 95 %   17 1544 - 91 25 156/89 98 %   17 1539 - 92 13 (!) 155/92 99 %   17 1534 97.8 °F (36.6 °C) 91 14 154/89 98 %   17 1529 - 92 14 162/87 98 %   17 1524 - 91 13 153/87 99 %   17 1518 - 93 18 170/90 99 %   17 1514 - 93 10 162/85 97 %   17 1508 - 100 16 (!) 158/100 91 %   17 1503 - 92 10 145/82 96 %   17 1458 - 91 12 144/80 98 %   17 1454 - 93 9 157/89 98 %   17 1449 - 93 13 162/84 98 %   17 1444 - 94 11 156/84 98 %   17 1441 97.9 °F (36.6 °C) 100 18 (!) 170/91 99 %   17 1440 - 98 10 (!) 170/91 98 %   17 1117 98.4 °F (36.9 °C) (!) 104 16 (!) 149/91 95 %     Temp (24hrs), Av.9 °F (36.6 °C), Min:97.4 °F (36.3 °C), Max:98.4 °F (36.9 °C)    Oxygen Therapy  O2 Sat (%): 97 % (09/03/17 0305)  Pulse via Oximetry: 91 beats per minute (09/02/17 1544)  O2 Device: Nasal cannula (09/02/17 1534)  O2 Flow Rate (L/min): 2 l/min (09/02/17 1534)  Oxygen Therapy  O2 Sat (%): 97 % (09/03/17 0305)  Pulse via Oximetry: 91 beats per minute (09/02/17 1544)  O2 Device: Nasal cannula (09/02/17 1534)  O2 Flow Rate (L/min): 2 l/min (09/02/17 1534)    Physical Exam:  General:         Alert, cooperative, no distress   HEENT:               NCAT. No obvious deformity. Nares normal. No drainage  Lungs:  CTABL. No wheezing/rhonchi/rales  Cardiovascular:   RRR. No m/r/g. No pedal edema b/l. +2 PT/DT pulses b/l. Abdomen:       S/nt/nd. Bowel sounds normal. .   Skin:         No rashes or lesions. Not Jaundiced  Neurologic:    AAOx3. CN II- XII grossly WNL. motor  Deficit and weakness L> R but improved  Psychiatric:         Good mood. Normal affect. Denies any SI/HI.                DIAGNOSTIC STUDIES      Data Review:   Recent Results (from the past 24 hour(s))   GLUCOSE, POC    Collection Time: 09/02/17 11:30 AM   Result Value Ref Range    Glucose (POC) 165 (H) 65 - 100 mg/dL   GLUCOSE, POC    Collection Time: 09/02/17  4:31 PM   Result Value Ref Range    Glucose (POC) 156 (H) 65 - 100 mg/dL   GLUCOSE, POC    Collection Time: 09/02/17  9:46 PM   Result Value Ref Range    Glucose (POC) 157 (H) 65 - 100 mg/dL   PLEASE READ & DOCUMENT PPD TEST IN 24 HRS    Collection Time: 09/03/17 12:45 AM   Result Value Ref Range    PPD Negative Negative    mm Induration 0 mm   CBC WITH AUTOMATED DIFF    Collection Time: 09/03/17  3:21 AM   Result Value Ref Range    WBC 7.7 4.3 - 11.1 K/uL    RBC 4.15 4.05 - 5.25 M/uL    HGB 10.6 (L) 11.7 - 15.4 g/dL    HCT 33.2 (L) 35.8 - 46.3 %    MCV 80.0 79.6 - 97.8 FL    MCH 25.5 (L) 26.1 - 32.9 PG    MCHC 31.9 31.4 - 35.0 g/dL    RDW 19.7 (H) 11.9 - 14.6 %    PLATELET 049 729 - 538 K/uL    MPV 9.0 (L) 10.8 - 14.1 FL    DF AUTOMATED      NEUTROPHILS 92 (H) 43 - 78 %    LYMPHOCYTES 5 (L) 13 - 44 %    MONOCYTES 2 (L) 4.0 - 12.0 %    EOSINOPHILS 0 (L) 0.5 - 7.8 %    BASOPHILS 0 0.0 - 2.0 %    IMMATURE GRANULOCYTES 0.7 0.0 - 5.0 %    ABS. NEUTROPHILS 7.0 1.7 - 8.2 K/UL    ABS. LYMPHOCYTES 0.4 (L) 0.5 - 4.6 K/UL    ABS. MONOCYTES 0.2 0.1 - 1.3 K/UL    ABS. EOSINOPHILS 0.0 0.0 - 0.8 K/UL    ABS. BASOPHILS 0.0 0.0 - 0.2 K/UL    ABS. IMM. GRANS. 0.1 0.0 - 0.5 K/UL   METABOLIC PANEL, BASIC    Collection Time: 09/03/17  3:21 AM   Result Value Ref Range    Sodium 143 136 - 145 mmol/L    Potassium 3.3 (L) 3.5 - 5.1 mmol/L    Chloride 103 98 - 107 mmol/L    CO2 33 (H) 21 - 32 mmol/L    Anion gap 7 7 - 16 mmol/L    Glucose 135 (H) 65 - 100 mg/dL    BUN 15 6 - 23 MG/DL    Creatinine 0.46 (L) 0.6 - 1.0 MG/DL    GFR est AA >60 >60 ml/min/1.73m2    GFR est non-AA >60 >60 ml/min/1.73m2    Calcium 10.1 8.3 - 10.4 MG/DL   GLUCOSE, POC    Collection Time: 09/03/17  3:46 AM   Result Value Ref Range    Glucose (POC) 136 (H) 65 - 100 mg/dL   GLUCOSE, POC    Collection Time: 09/03/17  8:13 AM   Result Value Ref Range    Glucose (POC) 175 (H) 65 - 100 mg/dL       All Micro Results     Procedure Component Value Units Date/Time    MRSA SCREEN - PCR (NASAL) [[id number]] Collected:  09/02/17 0130    Order Status:  Completed Specimen:  Nasal from Nares Updated:  09/02/17 0450     Special Requests: NO SPECIAL REQUESTS        Culture result:         MRSA target DNA is not detected (presumptive not colonized with MRSA)    INFLUENZA A & B AG (RAPID TEST) [[id number]] Collected:  09/01/17 2249    Order Status:  Completed Specimen:  Nasopharyngeal from Nasal washing Updated:  09/01/17 3357     Influenza A Ag NEGATIVE          NEGATIVE FOR THE PRESENCE OF INFLUENZA A ANTIGEN  INFECTION DUE TO INFLUENZA A CANNOT BE RULED OUT. BECAUSE THE ANTIGEN PRESENT IN THE SAMPLE MAY BE BELOW  THE DETECTION LIMIT OF THE TEST.   A NEGATIVE TEST IS PRESUMPTIVE AND IT IS RECOMMENDED THAT THESE RESULTS BE CONFIRMED BY VIRAL CULTURE OR AN FDA-CLEARED INFLUENZA A AND B MOLECULAR ASSAY. Influenza B Ag NEGATIVE          NEGATIVE FOR THE PRESENCE OF INFLUENZA B ANTIGEN  INFECTION DUE TO INFLUENZA B CANNOT BE RULED OUT. BECAUSE THE ANTIGEN PRESENT IN THE SAMPLE MAY BE BELOW  THE DETECTION LIMIT OF THE TEST. A NEGATIVE TEST IS PRESUMPTIVE AND IT IS RECOMMENDED THAT THESE RESULTS BE CONFIRMED BY VIRAL CULTURE OR AN FDA-CLEARED INFLUENZA A AND B MOLECULAR ASSAY. Imaging /Procedures /Studies:    CXR Results  (Last 48 hours)               09/01/17 1936  XR CHEST PORT Final result    Impression:  IMPRESSION: No acute findings in the chest           Narrative:  Chest X-ray       INDICATION:   Altered mental status, CVA       A portable AP view of the chest was obtained. FINDINGS: The lungs are clear. There are no infiltrates or effusions. The heart   size is normal.  Vascular port is present. CT Results  (Last 48 hours)               09/01/17 2053  CT HEAD WO CONT Final result    Impression:  IMPRESSION:       1. Moderate vasogenic edema in the right parieto-occipital lobe, suggestive of   underlying mass/tumor. Contrast-enhanced brain MRI is recommended to better   evaluate/characterize. 2. No acute intracranial hemorrhage or substantial midline shift. Narrative:  Noncontrast CT of the brain. COMPARISON: none       INDICATION: Left-sided weakness. History of recent colon resection. History of   sigmoid cancer. TECHNIQUE: Contiguous axial images were obtained from the skull base through the   vertex without IV contrast. Radiation dose reduction techniques were used for   this study:  Our CT scanners use one or all of the following: Automated exposure   control, adjustment of the mA and/or kVp according to patient's size, iterative   reconstruction. FINDINGS:       There is moderate vasogenic edema in the right parieto-occipital lobe. There is   slight mass effect upon the right lateral ventricle.  No substantial midline   shift. No hydrocephalus. No extra-axial fluid collection. Cerebellum and   brainstem are grossly unremarkable. There is no evidence of acute intracranial hemorrhage. The visualized orbits and the globes are intact. Visualized paranasal sinuses   and the mastoid air cells are aerated. Surrounding bones are intact. Mri Brain W Wo Cont    Result Date: 9/2/2017  IMPRESSION: Enhancing right occipital mass with significant surrounding vasogenic edema. Most likely a metastatic lesion given history. No results found for this visit on 09/01/17. Labs and Studies from previous 24 hours have been personally reviewed by myself Regina Problems    Diagnosis Date Noted    Brain tumor [facility]) 09/01/2017     Hospital Problems as of 9/3/2017  Date Reviewed: 9/2/2017          Codes Class Noted - Resolved POA    Brain tumor [facility]) ICD-10-CM: D49.6  ICD-9-CM: 239.6  9/1/2017 - Present Unknown              Plan:  Colon Cancer  With mets   Post  Res[facility] from [facility] reviewed pt failed chemotherapy  Post  Surgery  By  Dr Stefan Live    Cerebral edema  Concerning for metastasis brain tumor   MRI results noted at  This time the plan would be to  Follow up with  Radiation ONC  The family   Would  Also like PT  As an outpatient  As well, they would like to keep her home as long as possible  Oncology consult appreciated   Cont  steroids / Marcin Vásquez    Motor Weakness   Due to  Above  Edema  Improving   Will get pt/ot to  Evaluate   Cont  Steroids / Keppra    Dehydration / tachycardia   Improving   Cont with  IV fluids d5ns @70ml /hr     Moderate protein deficiency   Supplements ordered     Hyperglycemia from steroids   Expected elevation    Will start insulin  Sliding scale       DVT Prophylaxis:  CODE Status:   Plan of Care Discussed with: patient.  Care team.  Medical Risk:  Disposition:    Raj Krueger MD  09/03/17

## 2017-09-03 NOTE — PROGRESS NOTES
0700-Bedside report received from Lucy Babb, [facility]. Resting in bed. No needs voiced. No s/s of acute distress. 0754-Nurse spoke with Dr. Cristobal Lynch and he stated okay to insert may in patient however patient refuses may catheter at this time. 1825-  END OF SHIFT NOTE:    Intake/Output  09/03 0701 - 09/03 1900  In: 103 [I.V.:923]  Out: -    Voiding: YES  Catheter: NO  Drain:   Colostomy 08/23/17 Left;Mid Abdomen (Active)               Stool:  0 occurrences. Stool Assessment  Stool Appearance: Soft (09/03/17 0830)    Emesis:  0 occurrences. VITAL SIGNS  Patient Vitals for the past 12 hrs:   Temp Pulse Resp BP SpO2   09/03/17 1511 97.5 °F (36.4 °C) 87 18 (!) 159/94 97 %   09/03/17 1127 97.5 °F (36.4 °C) 84 18 158/85 97 %   09/03/17 0726 97.8 °F (36.6 °C) (!) 112 18 155/88 97 %       Pain Assessment  Pain 1  Pain Scale 1: Numeric (0 - 10) (09/03/17 1647)  Pain Intensity 1: 0 (09/03/17 1647)  Patient Stated Pain Goal: 0 (09/03/17 0300)  Pain Reassessment 1: Yes (09/03/17 1647)  Pain Onset 1: since surgery (09/02/17 0458)  Pain Location 1: Back (09/03/17 1608)  Pain Orientation 1: Left;Right (09/02/17 0458)  Pain Description 1: Aching (09/03/17 1608)  Pain Intervention(s) 1: Medication (see MAR) (09/03/17 1608)    Ambulating  No    Additional Information: Pts VSS and is in no acute distress. Pt worked with PT today and has received percocet for pain PRN. Pt had ostomy dressing changed. Pt has dry dressing to midline incision. Shift report given to oncoming nurse at the bedside.     Sanket Fisher

## 2017-09-03 NOTE — PROGRESS NOTES
Chart reviewed and patient status discussed with nurse. Per nurse - hold physical therapy at this time due to wound dehiscing. Will continue to attempt physical therapy evaluation as appropriate and as schedule allows.      Sheryle Kub, PT  9/3/2017

## 2017-09-03 NOTE — PROGRESS NOTES
OT Note: Patient stating her stomach is bothering her and requesting OT to return tomorrow. Patient assisted with donning gown. Plan to check back tomorrow.

## 2017-09-03 NOTE — PROGRESS NOTES
Problem: Falls - Risk of  Goal: *Absence of Falls  Document Ab Fall Risk and appropriate interventions in the flowsheet.    Outcome: Progressing Towards Goal  Fall Risk Interventions:  Mobility Interventions: Patient to call before getting OOB           Medication Interventions: Patient to call before getting OOB     Elimination Interventions: Call light in reach, Patient to call for help with toileting needs     History of Falls Interventions: Room close to nurse's station

## 2017-09-03 NOTE — PROGRESS NOTES
Call placed to Dr. Karlene Winters about pts incision dehiscing. He states my re-enforcement of the incision is sufficient for now.

## 2017-09-04 NOTE — PROGRESS NOTES
Problem: Falls - Risk of  Goal: *Absence of Falls  Document Ab Fall Risk and appropriate interventions in the flowsheet.    Outcome: Progressing Towards Goal  Fall Risk Interventions:  Mobility Interventions: Patient to call before getting OOB     Mentation Interventions: Door open when patient unattended     Medication Interventions: Patient to call before getting OOB     Elimination Interventions: Call light in reach     History of Falls Interventions: Door open when patient unattended
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
## 2017-09-04 NOTE — PROGRESS NOTES
END OF SHIFT NOTE:    Intake/Output      Voiding: YES  Catheter: NO  Drain:   Colostomy 08/23/17 Left;Mid Abdomen (Active)   Drainage Color Brown 9/4/2017  2:30 AM   Site Assessment Clean, dry, intact 9/4/2017  2:30 AM   Treatment Bag change;Site care; Wafer changed 9/3/2017  8:30 AM   Output (ml) 125 mL 9/4/2017  1:56 AM               Stool:  0 occurrences. Stool Assessment  Stool Appearance: Soft (09/03/17 0830)    Emesis:  0 occurrences. VITAL SIGNS  Patient Vitals for the past 12 hrs:   Temp Pulse Resp BP SpO2   09/04/17 0635 - 84 - 149/86 -   09/04/17 0403 97.5 °F (36.4 °C) 78 18 133/80 96 %   09/03/17 2306 97.6 °F (36.4 °C) 74 18 142/77 96 %   09/03/17 1915 97.4 °F (36.3 °C) 75 18 142/82 97 %       Pain Assessment  Pain 1  Pain Scale 1: Visual (09/04/17 0230)  Pain Intensity 1: 0 (09/04/17 0230)  Patient Stated Pain Goal: 0 (09/03/17 0300)  Pain Reassessment 1: Patient sleeping (09/04/17 0230)  Pain Onset 1: since surgery (09/02/17 0458)  Pain Location 1: Back (09/04/17 0155)  Pain Orientation 1: Left;Right (09/04/17 0155)  Pain Description 1: Aching (09/04/17 0155)  Pain Intervention(s) 1: Medication (see MAR) (09/04/17 0155)    Ambulating  No    Additional Information: Patient rested well through the evening. Patient was periodically confused and disoriented to situation. Pain was control with PO percocet. Shift report given to oncoming nurse Margarita Hawk RN at the bedside.     Darnell Peralta

## 2017-09-04 NOTE — PROGRESS NOTES
[facility]        Inpatient Hematology / Oncology Progress Note      Admission Date: 2017  6:37 PM  Reason for Admission/Hospital Course: Brain tumor [facility])  . 24 Hour Events:  Continues to improve  Alert and oriented  Daughter at bedside      ROS:  Constitutional:  negative for fever, chills, weakness, malaise, fatigue. CV: negative for chest pain, palpitations, edema. Respiratory: negative for dyspnea, cough, wheezing. GI: negative for nausea, abdominal pain, diarrhea. 10 point review of systems is otherwise negative with the exception of the elements mentioned above in the HPI. Allergies   Allergen Reactions    Penicillin G Unknown (comments)     Daughter states patient has Pcn allergy    Tolerates Zosyn - Divya Alex, PharmD       OBJECTIVE:  Patient Vitals for the past 8 hrs:   BP Temp Pulse Resp SpO2   17 1125 165/85 97.8 °F (36.6 °C) 65 18 97 %   17 0730 141/89 97.7 °F (36.5 °C) 69 16 97 %   17 0635 149/86 - 84 - -     Temp (24hrs), Av.6 °F (36.4 °C), Min:97.4 °F (36.3 °C), Max:97.8 °F (36.6 °C)     0701 -  1900  In: 240 [P.O.:240]  Out: -     Physical Exam:  Constitutional: Well developed, well nourished female in no acute distress, sitting comfortably in the hospital bed. HEENT: Normocephalic and atraumatic. Oropharynx is clear, mucous membranes are moist.  Extraocular muscles are intact. Sclerae anicteric. Skin Warm and dry. No bruising and no rash noted. No erythema. No pallor. Abdominal incisions dehiscing area at top approximately 2 cm and area at bottom of incision. No bleeding   Respiratory Lungs are clear to auscultation bilaterally without wheezes, rales or rhonchi, normal air exchange without accessory muscle use. CVS Normal rate, regular rhythm and normal S1 and S2. No murmurs, gallops, or rubs. Abdomen Soft, nontender and nondistended, normoactive bowel sounds. No palpable mass.   No hepatosplenomegaly. Neuro Grossly nonfocal with no obvious sensory or motor deficits. MSK Normal range of motion in general.  No edema and no tenderness. Psych Appropriate mood and affect.         Labs:      Recent Labs      09/03/17 0321 09/02/17 0443 09/01/17   1904   WBC  7.7  12.5*  12.4*   RBC  4.15  4.38  4.49   HGB  10.6*  11.2*  11.5*   HCT  33.2*  34.6*  35.8   MCV  80.0  79.0*  79.7   MCH  25.5*  25.6*  25.6*   MCHC  31.9  32.4  32.1   RDW  19.7*  19.9*  19.9*   PLT  348  345  351   GRANS  92*  96*  88*   LYMPH  5*  3*  6*   MONOS  2*  1*  5   EOS  0*  0*  1   BASOS  0  0  0   IG  0.7  0.5  0.5   DF  AUTOMATED  AUTOMATED  AUTOMATED   ANEU  7.0  11.9*  10.8*   ABL  0.4*  0.4*  0.7   ABM  0.2  0.2  0.7   SUN  0.0  0.0  0.2   ABB  0.0  0.0  0.0   AIG  0.1  0.1  0.1        Recent Labs      09/03/17 0321 09/02/17 0443 09/01/17   1904   NA  143  137  137   K  3.3*  3.4*  3.5   CL  103  97*  95*   CO2  33*  32  33*   AGAP  7  8  9   GLU  135*  142*  121*   BUN  15  10  13   CREA  0.46*  0.45*  0.57*   GFRAA  >60  >60  >60   GFRNA  >60  >60  >60   CA  10.1  10.2  10.4   SGOT   --    --   19   AP   --    --   178*   TP   --    --   7.1   ALB   --    --   1.8*   GLOB   --    --   5.3*   AGRAT   --    --   0.3*   MG   --   1.9   --    PHOS   --   4.1   --          Imaging:      ASSESSMENT:    Problem List  Date Reviewed: 9/2/2017          Codes Class Noted    B[facility] ICD-10-CM: D49.6  ICD-9-CM: 239.6  9/1/2017        Cancer of sigmoid colon (HCC) ICD-10-CM: C18.7  ICD-9-CM: 153.3  8/23/2017        Intractable pain ICD-10-CM: R52  ICD-9-CM: 780.96  8/14/2017        Colovesical fistula ICD-10-CM: N32.1  ICD-9-CM: 596.1  7/24/2017        UTI (urinary tract infection) ICD-10-CM: N39.0  ICD-9-CM: 599.0  7/15/2017        Difficulty coping with disease ICD-10-CM: R45.89  ICD-9-CM: 799.29  2/23/2017        Sick ICD-10-CM: Z68  ICD-9-CM: 799.9  2/21/2017        Fever and chills ICD-10-CM: R50.9  ICD-9-CM: 780.60  2/21/2017        Pain ICD-10-CM: R52  ICD-9-CM: 780.96  2/21/2017        Tachycardia ICD-10-CM: R00.0  ICD-9-CM: 785.0  11/15/2016        Hypokalemia ICD-10-CM: E87.6  ICD-9-CM: 276.8  11/15/2016        Anemia associated with chemotherapy ICD-10-CM: D64.81, T45.1X5A  ICD-9-CM: 285.3, E933.1  11/15/2016        * (Principal)Metastatic colon cancer in female [facility]) ICD-10-CM: C78.5  ICD-9-CM: 197.5  9/26/2016        Lymphadenopathy ICD-10-CM: R59.1  ICD-9-CM: 785.6  9/9/2016                PLAN:    CT head shows moderate vasogenic edema in the right parieto-occipital lobe, suggestive of underlying mass/tumor. We will consult Rad. Onc on Tuesday. Primary is making arrangements for possible sedated MRI. Agree with steroids ans Keppra. Once MRI is completed we can make further suggestions. We will follow along loosely for results. 9/3 Patient is much improved today following steroids. She is alert and oriented using all extremities without difficulty. MRI Enhancing right occipital mass with significant surrounding vasogenic edema. Most likely a metastatic lesion given history. Consult with Rad/Onc on Tuesday. 9/4 Continues to improve daily. Consulting Rad/Onc tomorrow     Lab studies and imaging studies were personally reviewed. Pertinent old records were reviewed.      Thank you for allowing us to participate in the care of Ms. [address], [facility]  [address] [address]  Office : [phone]  Fax : [phone]       Attending Addendum:  I personally evaluated the patient with Kanu Cisneros N.P.,  and agree with the assessment, findings and plan as documented. Appears stable, heart regular without murmur, lungs clear, abdomen benign. LT sided weakness improved on steroids. Rad-onc consult tomorrow.                 Abida Aldridge MD  [address] [place] [address] [facility]  [address] [place] [address] [address]  Office : [phone]  Fax : [phone]

## 2017-09-04 NOTE — WOUND CARE
Noted consult for ostomy cares. Patient's daughter is independent with pouch emptying and pouch changing. Encouraged to bring own supply for all future hospital stay, educated family that only one type is available here, verbalized understanding. Current pouch is intact with small amount soft brown output. Available if needed however no plans to see frequently this hospitalization, please call if needed.
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
## 2017-09-04 NOTE — PROGRESS NOTES
Hospitalist Progress Note    2017  Admit Date: 2017  6:37 PM   NAME: Esperanza Jimenez   :  1960   MRN:  [id number]   Attending: Dayne Hoyt MD  PCP:  G[place], DO    SUBJECTIVE:     Esperanza Jimenez is a 44ZOY with metastatic colon cancer (followed by Dr. Apolinar Jeffers) who recently underwent abd surgery with Dr. Aviles Slider on . Has had worsening L sided weakness, found to have occipital mass with overlying vasogenic edema. Onc following. Plan for rad/onc to see tomorrow    : L sided weakness continues to improve UE > LE. She has no complaints. Denies HA, vision changes. Review of Systems negative with exception of pertinent positives noted above      PHYSICAL EXAM       Visit Vitals    /85    Pulse 65    Temp 97.8 °F (36.6 °C)    Resp 18    Ht 5' 6\" (1.676 m)    Wt 64.2 kg (141 lb 9.6 oz)    SpO2 97%    Breastfeeding No    BMI 22.85 kg/m2      Temp (24hrs), Av.6 °F (36.4 °C), Min:97.4 °F (36.3 °C), Max:97.8 °F (36.6 °C)    Oxygen Therapy  O2 Sat (%): 97 % (17 1134)  Pulse via Oximetry: 91 beats per minute (17 1544)  O2 Device: Nasal cannula (17 1534)  O2 Flow Rate (L/min): 2 l/min (17 1534)    Intake/Output Summary (Last 24 hours) at 17 1152  Last data filed at 17 0907   Gross per 24 hour   Intake             2666 ml   Output              125 ml   Net             2541 ml          General: No acute distress. Head:  Atraumatic Normocephalic. Eyes:  Anicteric. Lungs:  CTA Bilaterally. Nonlabored on RA  CVS:  RRR  Abdomen: Soft, NTTP. Neurologic:  AOx3.  CN intact, LUE 4/5, LLE 3/5, R side 5/5      Recent Results (from the past 24 hour(s))   GLUCOSE, POC    Collection Time: 17  4:11 PM   Result Value Ref Range    Glucose (POC) 156 (H) 65 - 100 mg/dL   GLUCOSE, POC    Collection Time: 17  9:15 PM   Result Value Ref Range    Glucose (POC) 176 (H) 65 - 100 mg/dL   PLEASE READ & DOCUMENT PPD TEST IN 48 HRS Collection Time: 09/04/17 12:46 AM   Result Value Ref Range    PPD negative Negative    mm Induration 0 mm   GLUCOSE, POC    Collection Time: 09/04/17  8:30 AM   Result Value Ref Range    Glucose (POC) 155 (H) 65 - 100 mg/dL         Imaging /Procedures /Studies   MRI BRAIN W WO CONT   Final Result   IMPRESSION: Enhancing right occipital mass with significant surrounding   vasogenic edema. Most likely a metastatic lesion given history. CT HEAD WO CONT   Final Result   IMPRESSION:      1. Moderate vasogenic edema in the right parieto-occipital lobe, suggestive of   underlying mass/tumor. Contrast-enhanced brain MRI is recommended to better   evaluate/characterize. 2. No acute intracranial hemorrhage or substantial midline shift.       XR CHEST PORT   Final Result   IMPRESSION: No acute findings in the chest               ASSESSMENT      Hospital Problems as of 9/4/2017  Date Reviewed: 9/2/2017          Codes Class Noted - Resolved POA    Brain tumor ([facility] [address] ICD-10-CM: D49.6  ICD-9-CM: 239.6  9/1/2017 - Present Unknown                  Plan:  - Metastatic colon cancer:  Recent resection with Dr. Kimber Birmingham following for post-op wound care    - Brain tumor occipital lobe with cerebral edema:  MRI as above  Rad/onc consult pending  Continue steroids and keppra    - L sided weakness:  2/2 brain tumor  Improving with steroids  PT/OT consulted    - Tachycardia:   Resolved with IVF    - Hyperglycemia from steroids:  Continue SSI ACHS    DVT Prophylaxis: Hep SQ  Dispo: home when ok with oncology    Diamante Adan MD  ;

## 2017-09-04 NOTE — PROGRESS NOTES
Follow-up visit with Mrs. Meadows and her spouse to continue spiritual support. Patient and spouse are known to me from a previous admission.      Ashley Gaspar 68  Board Certified

## 2017-09-04 NOTE — PROGRESS NOTES
Met with patient and family in room. Per family / plan to take patient home at discharge. Per family, patient is current with [facility] and would like to go home and resume services with [facility]. Per family, they are with patient at home to assist and have a walker, W/C, grab bars, tempurpedic bed and lifted toilet seat  / patient is not on any Home O2. Called Chip with [facility] / patient is current with [facility] for Nursing / Suzy Valdes / Venus Rodriguez / Christina Krause. Plan to resume services with [facility] when patient discharges. Will continue to follow for any other needs or concerns.

## 2017-09-04 NOTE — PROGRESS NOTES
0700-Bedside report received from [facility], [address] [place] [address]. Resting in bed. No needs voiced. No s/s of acute distress. 1806-  END OF SHIFT NOTE:    Intake/Output  09/04 0701 - 09/04 1900  In: 4213 [P.O.:480; I.V.:664]  Out: 150    Voiding: YES  Catheter: NO  Drain:   Colostomy 08/23/17 Left;Mid Abdomen (Active)   Drainage Color Raven Gear 9/4/2017  4:40 PM   Site Assessment Clean, dry, intact 9/4/2017  2:30 AM   Treatment Bag change;Site care; Wafer changed 9/3/2017  8:30 AM   Output (ml) 150 mL 9/4/2017  4:40 PM               Stool:  0 occurrences. Stool Assessment  Stool Color: Brown (09/04/17 1640)  Stool Appearance: Soft (09/04/17 1640)  Stool Amount: Small (09/04/17 1640)  Stool Source/Status: Colostomy (09/04/17 1640)    Emesis:  0 occurrences. VITAL SIGNS  Patient Vitals for the past 12 hrs:   Temp Pulse Resp BP SpO2   09/04/17 1516 98.1 °F (36.7 °C) 61 18 141/77 96 %   09/04/17 1125 97.8 °F (36.6 °C) 65 18 165/85 97 %   09/04/17 0730 97.7 °F (36.5 °C) 69 16 141/89 97 %   09/04/17 0635 - 84 - 149/86 -       Pain Assessment  Pain 1  Pain Scale 1: Numeric (0 - 10) (09/04/17 1715)  Pain Intensity 1: 0 (09/04/17 1715)  Patient Stated Pain Goal: 0 (09/03/17 0300)  Pain Reassessment 1: Yes (09/04/17 1715)  Pain Onset 1:  (pain occured in her low back when trying to stand her.) (09/04/17 1353)  Pain Location 1: Back (09/04/17 1644)  Pain Orientation 1: Lower (09/04/17 1353)  Pain Description 1: Aching (09/04/17 1644)  Pain Intervention(s) 1: Medication (see MAR) (09/04/17 1644)    Ambulating  No    Additional Information: Pts VSS and is in no acute distress. Pt will see rad onc tomorrow. Pt has received percocet for pain several times. Pts midline incision is dry and intact. Shift report given to oncoming nurse at the bedside.     Jonh Fisher

## 2017-09-04 NOTE — PROGRESS NOTES
Problem: Falls - Risk of  Goal: *Absence of Falls  Document Ab Fall Risk and appropriate interventions in the flowsheet.    Outcome: Progressing Towards Goal  Fall Risk Interventions:  Mobility Interventions: Patient to call before getting OOB     Mentation Interventions: Bed/chair exit alarm, Family/sitter at bedside     Medication Interventions: Patient to call before getting OOB     Elimination Interventions: Call light in reach     History of Falls Interventions: Door open when patient unattended

## 2017-09-05 PROBLEM — E43 SEVERE PROTEIN-CALORIE MALNUTRITION (HCC): Status: ACTIVE | Noted: 2017-01-01

## 2017-09-05 NOTE — PROGRESS NOTES
Mid abdominal incision became full of stool when patient was turned for a brief changing. Call placed to surgeon on call, Dr. Giselle Maher at [phone]. Orders were placed for patient's diet to be switched to NPO and a new dressing to be applied. Colostomy bag and wafer were replaced. Abdominal dressings were replaced. Skin around upper incision was cleaned with water and dressed with a wet to dry dressing with normal saline using sterile technique with gauze and tape to cover. Skin around lower incision was cleaned with water and steristrips were placed with gauze and tape to cover. Upper incision was open by 3 cm long by .5 cm wide and lower incision was open by 5 cm long by 1 cm wide.

## 2017-09-05 NOTE — CDMP QUERY
Please clarify if this patient is being treated/managed for:    Severe Protein Calorie Malnutrition  In the setting of cancer with poor po intake, moderate to severe muscle and subq fat loss, 14 pound weight loss in 2 weeks, being treated with RD consult and supplements. =>Other Explanation of clinical findings  =>Unable to Determine (no explanation of clinical findings)    The medical record reflects the following:    Risk Factors: Colon Ca, Brain Ca    Clinical Indicators: Per weights in EMR, potential 14 pound, 8.8% clinically significant weight loss within 2 weeks (since colectomy). Potential 38 pound, 20.8% clinically significant weight loss over ~1 year. Meets Criteria for Malnutrition in the context of Acute Illness/Injury   [X] Severe Malnutrition, as evidenced by:              [X] Moderate to severe loss of muscle mass              [ ] Nutritional intake of <50% of energy intake compared to estimated energy needs for > 5 days              [X] Weight loss of >2% in 1 week, >5% in 1 month, >7.5% in 3 months              [ ] Moderate to severe edema              [X] Moderate to severe loss of subcutaneous fat              Treatment: RD consult and supplements ordered. Please clarify and document your clinical opinion in the progress notes and discharge summary including the definitive and/or presumptive diagnosis, (suspected or probable), related to the above clinical findings. Please include clinical findings supporting your diagnosis.     Thanks,  Sapphire Kenny RN, CDS  Compliant Documentation Management Program  [phone]

## 2017-09-05 NOTE — PROGRESS NOTES
Problem: Falls - Risk of  Goal: *Absence of Falls  Document Ab Fall Risk and appropriate interventions in the flowsheet.    Outcome: Progressing Towards Goal  Fall Risk Interventions:  Mobility Interventions: Patient to call before getting OOB     Mentation Interventions: Family/sitter at bedside     Medication Interventions: Patient to call before getting OOB     Elimination Interventions: Call light in reach     History of Falls Interventions: Door open when patient unattended

## 2017-09-05 NOTE — PROGRESS NOTES
Yellow, thick drainage with no odor was noted on the upper and lower abdominal dressing. Dressing below and above the umbilicus was reinforced with additional steristrips and 4x4 gauze with tape.

## 2017-09-05 NOTE — PROGRESS NOTES
[facility] liaison) made aware of patient impending discharge and resumption of Home Health orders.

## 2017-09-05 NOTE — PROGRESS NOTES
Pt's D/C instructions completed. Verbalized understanding of all instructions including diet, activity, s/sx to alert MD, medications, wound care, and f/u appointment. Family at [facility].

## 2017-09-05 NOTE — DISCHARGE SUMMARY
Hospitalist Discharge Summary     Patient ID:  Michael Cruz  [id number]  36 y.o.  1960  Admit date: 9/1/2017  6:37 PM  Discharge date and time: 9/5/2017  Attending: Shailesh Mcneil MD  PCP:  Jory Albarran DO  Treatment Team: Attending Provider: Shailesh Mcneil MD; Consulting Provider: Clau George MD; Utilization Review: Hermelinda Delaney RN; Surgeon: Jhon Jha MD; Utilization Review: Kem Perez; Consulting Provider: John Leon MD    Principal Diagnosis Metastatic colon cancer in [facility])   Hospital Problems as of 9/5/2017  Date Reviewed: 9/2/2017          Codes Class Noted - Resolved POA    Severe protein-calorie malnutrition ([facility] [address] ICD-10-CM: N28  ICD-9-CM: 262  9/5/2017 - Present Yes        Brain tumor ([facility] [address] ICD-10-CM: D49.6  ICD-9-CM: 239.6  9/1/2017 - Present Unknown        * (Principal)Metastatic colon cancer in [facility]) ICD-10-CM: C78.5  ICD-9-CM: 197.5  9/26/2016 - Present Yes              HPI: 64 y. o.white female, with colon cancer, who presents to the ED for left sided arm/leg weakness present since POD 1 on 8/24 after abdominal surgery with Dr. Essence Monsalve. She has colon cancer that progressed despite chemotherapy associated with UTI and subsequent colovesicular fistula. She was taken to the OR on 8/23/17 by Dr. Essence Monsalve for Sigmoidectomy with colostomy, Abdominal wall resection, Partial cystectomy with fistula resection and primary repair, Partial hysterectomy and left salpingectomy and Mobilization of splenic flexure. She was admitted to the floor for routine post-operative management. She progressed from surgery standpoint well. She is tolerating a GI soft diet and ostomy is functioning. Moran removed earlier today by [place] nurse and she has urinated independently since then. Family states she continues to be unable to walk and is very weak.   Surgery ordered a MRI during previous hospitalization but patient elected to be discharged at that time and thus outpatient MRI scheduled which has not been completed yet. CT head obtained in ED reveals right parieto-occipital lobe vasogenic edema suggestive of underlying mass/tumor. She endorses diffuse pain and is anxious/emotional.  /daughter/granddaughter at bedside. Hospital Course: Admitted on 9/1 with worsening L sided weakness, found to have occipital mass with overlying vasogenic edema. Symptoms have been improving on steroids. Was started on ppx keppra as well. Though no seizure activity overtly seen during this admission. She did have some hyperglycemia since starting steroids, which have been controlled with insulin while here. However, will send out on metformin for ease of administration for patient and the family. Because of her recent partial colectomy, she was seen by Dr. Juliana Laguerre while inpatient and will require daily dressing changes at discharge. She was seen by onc while inpatient, who have arranged her to see rad/onc tomorrow. Her L sided weakness has improved, but she is still significantly debilitated. PT recommended STR, but patient and her family were adamant that she be discharged home with home health services. Significant Diagnostic Studies:   Labs: Results:       Chemistry Recent Labs      09/03/17   0321   GLU  135*   NA  143   K  3.3*   CL  103   CO2  33*   BUN  15   CREA  0.46*   CA  10.1   AGAP  7      CBC w/Diff Recent Labs      09/03/17   0321   WBC  7.7   RBC  4.15   HGB  10.6*   HCT  33.2*   PLT  348   GRANS  92*   LYMPH  5*   EOS  0*      Cardiac Enzymes No results for input(s): CPK, CKND1, DOUG in the last 72 hours. No lab exists for component: CKRMB, TROIP   Coagulation No results for input(s): PTP, INR, APTT in the last 72 hours.     No lab exists for component: INREXT, INREXT    Lipid Panel No results found for: CHOL, CHOLPOCT, CHOLX, CHLST, CHOLV, 830480, HDL, LDL, LDLC, DLDLP, 700494, VLDLC, VLDL, TGLX, TRIGL, TRIGP, TGLPOCT, CHHD, CHHDX   BNP No results for input(s): BNPP in the last 72 hours. Liver Enzymes No results for input(s): TP, ALB, TBIL, AP, SGOT, GPT in the last 72 hours. No lab exists for component: DBIL   Thyroid Studies Lab Results   Component Value Date/Time    T4, Total 10.7 08/30/2016 04:34 PM    T3 Uptake 24 08/30/2016 04:34 PM    TSH 0.824 08/30/2016 04:34 PM            Discharge Exam:  Visit Vitals    /71    Pulse (!) 55    Temp 98.4 °F (36.9 °C)    Resp 18    Ht 5' 6\" (1.676 m)    Wt 66.8 kg (147 lb 4.8 oz)    SpO2 100%    Breastfeeding No    BMI 23.77 kg/m2     General appearance: alert, cooperative, NAD  Lungs: clear to auscultation bilaterally  Heart: regular rate and rhythm  Abdomen: soft, NTTP  Extremities: no cyanosis or edema  Neurologic: A&Ox3, mood a little labile, CN intact, LUE 4/5, LLE 3/5, R side 5/5    Disposition: home with [facility]  Discharge Condition: stable  Patient Instructions:   Current Discharge Medication List      START taking these medications    Details   metFORMIN (GLUCOPHAGE) 500 mg tablet Take 1 Tab by mouth two (2) times daily (with meals). Qty: 60 Tab, Refills: 2      levETIRAcetam (KEPPRA) 1,000 mg tablet Take 1 Tab by mouth two (2) times a day. Qty: 60 Tab, Refills: 3      dexamethasone (DECADRON) 2 mg tablet Take 2 Tabs by mouth every six (6) hours. Qty: 240 Tab, Refills: 2         CONTINUE these medications which have NOT CHANGED    Details   pantoprazole (PROTONIX) 40 mg tablet Take 1 Tab by mouth Daily (before breakfast). Qty: 30 Tab, Refills: 0      oxyCODONE-acetaminophen (PERCOCET 10)  mg per tablet Take 1 Tab by mouth every four (4) hours as needed. Max Daily Amount: 6 Tabs. Qty: 40 Tab, Refills: 0      oxyCODONE ER (OXYCONTIN) 20 mg ER tablet Take 1 Tab by mouth every twelve (12) hours. Max Daily Amount: 40 mg.  Qty: 30 Tab, Refills: 0      metoprolol tartrate (LOPRESSOR) 50 mg tablet Take 1 Tab by mouth every twelve (12) hours.   Qty: 60 Tab, Refills: 0      docusate sodium (COLACE) 100 mg capsule Take 1 Cap by mouth two (2) times a day. Hold for liquid stool  Qty: 60 Cap, Refills: 0      potassium chloride (K-DUR, KLOR-CON) 20 mEq tablet Take 2 Tabs by mouth two (2) times a day. Qty: 120 Tab, Refills: 0      ALPRAZolam (XANAX) 2 mg tablet Take 2 mg by mouth nightly as needed for Anxiety. PARoxetine (PAXIL) 40 mg tablet Take 1 Tab by mouth daily. Indications: GENERALIZED ANXIETY DISORDER, PANIC DISORDER  Qty: 30 Tab, Refills: 2    Associated Diagnoses: Malignant neoplasm of sigmoid colon (HCC)      LORazepam (ATIVAN) 1 mg tablet Take 1 Tab by mouth every four (4) hours as needed for Anxiety. Max Daily Amount: 6 mg. Qty: 120 Tab, Refills: 0    Associated Diagnoses: Malignant neoplasm of colon, unspecified part of colon ([address] Vaginal bleeding      magnesium hydroxide (MILK OF MAGNESIA) 400 mg/5 mL suspension Take 30 mL by mouth two (2) times a day for 30 days. Hold for liquid stool. Qty: 1800 mL, Refills: 0      ondansetron hcl (ZOFRAN) 8 mg tablet Take 1 Tab by mouth every eight (8) hours as needed for Nausea. Qty: 90 Tab, Refills: 3      cyclobenzaprine (FLEXERIL) 10 mg tablet Take 1 Tab by mouth three (3) times daily (with meals). Qty: 30 Tab, Refills: 0      lidocaine-prilocaine (EMLA) topical cream Apply  to affected area as needed. Apply 1/2 inch cream to port site 90 minutes prior to access  Qty: 30 g, Refills: 0      prochlorperazine (COMPAZINE) 10 mg tablet Take 1 Tab by mouth every six (6) hours as needed.   Qty: 120 Tab, Refills: 3         STOP taking these medications       levoFLOXacin (LEVAQUIN) 750 mg tablet Comments:   Reason for Stopping:         enoxaparin (LOVENOX) 40 mg/0.4 mL Comments:   Reason for Stopping:               Activity: PT/OT per Home Health  Diet: Regular Diet  Wound Care: 2 small open wounds at incision site, need daily packing per Dr. Sara Menezes tomorrow  -   Dr. Jann Green as scheduled  -   Dr. Loni Jin in 2 weeks    Time spent to discharge patient: 35min    Signed:  Iraida Kellogg MD  9/5/2017  11:29 AM

## 2017-09-05 NOTE — PROGRESS NOTES
Per MD, patient discharging home / sent information to [facility] [place] to resume services (RN/PT/OT/ST).

## 2017-09-05 NOTE — PROGRESS NOTES
END OF SHIFT NOTE:    Intake/Output      Voiding: YES  Catheter: NO  Drain:   Colostomy 08/23/17 Left;Mid Abdomen (Active)   Drainage Color Victorine Silk 9/5/2017  2:34 AM   Site Assessment Clean, dry, intact 9/5/2017  2:34 AM   Treatment Bag change;Site care; Wafer changed 9/4/2017 11:30 PM   Output (ml) 75 mL 9/4/2017 11:30 PM               Stool:  0 occurrences. Stool Assessment  Stool Color: Brown (09/04/17 1640)  Stool Appearance: Soft (09/04/17 1940)  Stool Amount: Small (09/04/17 1640)  Stool Source/Status: Colostomy (09/04/17 1640)    Emesis:  0 occurrences. VITAL SIGNS  Patient Vitals for the past 12 hrs:   Temp Pulse Resp BP SpO2   09/05/17 0415 - 73 - 137/77 -   09/05/17 0320 97.6 °F (36.4 °C) 71 12 185/85 98 %   09/04/17 2327 98 °F (36.7 °C) 62 18 155/76 98 %   09/04/17 1918 98.1 °F (36.7 °C) 68 18 165/73 95 %       Pain Assessment  Pain 1  Pain Scale 1: Numeric (0 - 10) (09/05/17 0547)  Pain Intensity 1: 6 (09/05/17 0547)  Patient Stated Pain Goal: 0 (09/03/17 0300)  Pain Reassessment 1: Yes (09/05/17 0547)  Pain Onset 1:  (pain occured in her low back when trying to stand her.) (09/04/17 1353)  Pain Location 1: Abdomen;Vagina (09/05/17 0515)  Pain Orientation 1: Lower (09/05/17 0515)  Pain Description 1: Aching (09/05/17 0515)  Pain Intervention(s) 1: Medication (see MAR) (09/05/17 0515)    Ambulating  No    Additional Information: Patient's pain was controlled through the evening with percocet and IV morphine post NPO order. Patient has been NPO since 0030 per doctors orders due to the abdominal incision becoming full of stool. Patient's abdominal dressings were replaced and remained clean, dry, and intact. Yellow drainage was noted from the bottom incision. Patient received a dose of prn hydralazine IV due to a blood pressure of 185/85. Blood pressure regulated at 137/77. Shift report given to oncoming nurse Kalpana Lawson RN at the bedside.     Cornel Hicks

## 2017-09-05 NOTE — PROGRESS NOTES
[facility] Hematology & Oncology        Inpatient Hematology / Oncology Progress Note      Admission Date: 2017  6:37 PM  Reason for Admission/Hospital Course: Brain tumor [facility])  . 24 Hour Events:  Continues to improve  Alert and oriented  Daughter at bedside      ROS:  Constitutional:  negative for fever, chills,fatigue. CV: negative for chest pain, palpitations, edema. Respiratory: negative for dyspnea, cough, wheezing. GI: negative for nausea, abdominal pain, diarrhea. 10 point review of systems is otherwise negative with the exception of the elements mentioned above in the HPI. Allergies   Allergen Reactions    Penicillin G Unknown (comments)     Daughter states patient has Pcn allergy    Tolerates Zosyn - Ruby Denny, PharmD       OBJECTIVE:  Patient Vitals for the past 8 hrs:   BP Temp Pulse Resp SpO2 Weight   17 0730 161/82 98 °F (36.7 °C) 76 18 98 % -   17 0415 137/77 - 73 - - -   17 0350 - - - - - 147 lb 4.8 oz (66.8 kg)   17 0320 185/85 97.6 °F (36.4 °C) 71 12 98 % -     Temp (24hrs), Av.9 °F (36.6 °C), Min:97.6 °F (36.4 °C), Max:98.1 °F (36.7 °C)         Physical Exam:  Constitutional: Well developed, well nourished female in no acute distress, sitting comfortably in the hospital bed. HEENT: Normocephalic and atraumatic. Oropharynx is clear, mucous membranes are moist.  Extraocular muscles are intact. Sclerae anicteric. Skin Warm and dry. No bruising and no rash noted. No erythema. No pallor. Abdominal incision now with packing. No bleeding   Respiratory Lungs are clear to auscultation bilaterally without wheezes, rales or rhonchi, normal air exchange without accessory muscle use. CVS Normal rate, regular rhythm and normal S1 and S2. No murmurs, gallops, or rubs. Abdomen Soft, nontender and nondistended, normoactive bowel sounds. Neuro Grossly nonfocal with no obvious sensory or motor deficits.    MSK Normal range of motion in general.  No edema and no tenderness. Psych Appropriate mood and affect. Labs:      Recent Labs      09/03/17   0321   WBC  7.7   RBC  4.15   HGB  10.6*   HCT  33.2*   MCV  80.0   MCH  25.5*   MCHC  31.9   RDW  19.7*   PLT  348   GRANS  92*   LYMPH  5*   MONOS  2*   EOS  0*   BASOS  0   IG  0.7   DF  AUTOMATED   ANEU  7.0   ABL  0.4*   ABM  0.2   SUN  0.0   ABB  0.0   AIG  0.1        Recent Labs      09/03/17   0321   NA  143   K  3.3*   CL  103   CO2  33*   AGAP  7   GLU  135*   BUN  15   CREA  0.46*   GFRAA  >60   GFRNA  >60   CA  10.1         Imaging:      ASSESSMENT:    Problem List  Date Reviewed: 9/2/2017          Codes Class Noted    Brain tumor ([facility] [address] ICD-10-CM: D49.6  ICD-9-CM: 239.6  9/1/2017        Cancer of sigmoid colon ([facility] [address] ICD-10-CM: C18.7  ICD-9-CM: 153.3  8/23/2017        Intractable pain ICD-10-CM: R52  ICD-9-CM: 780.96  8/14/2017        Colovesical fistula ICD-10-CM: N32.1  ICD-9-CM: 596.1  7/24/2017        UTI (urinary tract infection) ICD-10-CM: N39.0  ICD-9-CM: 599.0  7/15/2017        Difficulty coping with disease ICD-10-CM: R45.89  ICD-9-CM: 799.29  2/23/2017        Sick ICD-10-CM: M13  ICD-9-CM: 799.9  2/21/2017        Fever and chills ICD-10-CM: R50.9  ICD-9-CM: 780.60  2/21/2017        Pain ICD-10-CM: R52  ICD-9-CM: 780.96  2/21/2017        Tachycardia ICD-10-CM: R00.0  ICD-9-CM: 785.0  11/15/2016        Hypokalemia ICD-10-CM: E87.6  ICD-9-CM: 276.8  11/15/2016        Anemia associated with chemotherapy ICD-10-CM: D64.81, T45.1X5A  ICD-9-CM: 285.3, E933.1  11/15/2016        * (Principal)Metastatic colon cancer in female [facility]) ICD-10-CM: C78.5  ICD-9-CM: 197.5  9/26/2016        Lymphadenopathy ICD-10-CM: R59.1  ICD-9-CM: 785.6  9/9/2016                PLAN:    CT head shows moderate vasogenic edema in the right parieto-occipital lobe, suggestive of underlying mass/tumor. We will consult Rad. Onc on Tuesday. Primary is making arrangements for possible sedated MRI.  Agree with steroids ans Keppra. Once MRI is completed we can make further suggestions. We will follow along loosely for results. 9/3 Patient is much improved today following steroids. She is alert and oriented using all extremities without difficulty. MRI Enhancing right occipital mass with significant surrounding vasogenic edema. Most likely a metastatic lesion given history. Consult with Rad/Onc on Tuesday. 9/4 Continues to improve daily. Consulting Rad/Onc tomorrow    9/5 Patient and daughter adamantly refuse for patient to go to rehab event though PT highly recommended. She will need home health and PT. From oncology standpoint she can be discharged. She has appointment with Rad/Onc tomorrow. She will need follow up appointment with Dr. Nickie Serna.      Lab studies and imaging studies were personally reviewed. Pertinent old records were reviewed.      Thank you for allowing us to participate in the care of Ms. [address], NP [facility]  [id number] [place] [address]  Office : [phone]  Fax : [phone]

## 2017-09-05 NOTE — DISCHARGE INSTRUCTIONS
PT/OT per Home Health  Diet: Regular Diet  Wound Care: 2 small open wounds at incision site, need daily packing per Dr. Luz Pham from Nurse    The following personal items are in your possession at time of discharge:    Dental Appliances: Uppers, Lowers, With patient  Visual Aid: None     Home Medications: None  Jewelry: None  Clothing: Pants, Shirt, With patient  Other Valuables: None  Personal Items Sent to Safe: none          PATIENT INSTRUCTIONS:    After general anesthesia or intravenous sedation, for 24 hours or while taking prescription Narcotics:  · Limit your activities  · Do not drive and operate hazardous machinery  · Do not make important personal or business decisions  · Do  not drink alcoholic beverages  · If you have not urinated within 8 hours after discharge, please contact your surgeon on call. Report the following to your surgeon:  · Excessive pain, swelling, redness or odor of or around the surgical area  · Temperature over 100.5  · Nausea and vomiting lasting longer than 4 hours or if unable to take medications  · Any signs of decreased circulation or nerve impairment to extremity: change in color, persistent  numbness, tingling, coldness or increase pain  · Any questions        What to do at Home:  Recommended activity: Activity as tolerated, per MD instructions and PT    If you experience any of the following symptoms fever > 100.5, nausea, vomiting, pain, chest pain, shortness of breath please follow up with MD.      *  Please give a list of your current medications to your Primary Care Provider. *  Please update this list whenever your medications are discontinued, doses are      changed, or new medications (including over-the-counter products) are added. *  Please carry medication information at all times in case of emergency situations.           These are general instructions for a healthy lifestyle:    No smoking/ No tobacco products/ Avoid exposure to second hand smoke    Surgeon General's Warning:  Quitting smoking now greatly reduces serious risk to your health. Obesity, smoking, and sedentary lifestyle greatly increases your risk for illness    A healthy diet, regular physical exercise & weight monitoring are important for maintaining a healthy lifestyle    You may be retaining fluid if you have a history of heart failure or if you experience any of the following symptoms:  Weight gain of 3 pounds or more overnight or 5 pounds in a week, increased swelling in our hands or feet or shortness of breath while lying flat in bed. Please call your doctor as soon as you notice any of these symptoms; do not wait until your next office visit. Recognize signs and symptoms of STROKE:    F-face looks uneven    A-arms unable to move or move unevenly    S-speech slurred or non-existent    T-time-call 911 as soon as signs and symptoms begin-DO NOT go       Back to bed or wait to see if you get better-TIME IS BRAIN. Warning Signs of HEART ATTACK     Call 911 if you have these symptoms:   Chest discomfort. Most heart attacks involve discomfort in the center of the chest that lasts more than a few minutes, or that goes away and comes back. It can feel like uncomfortable pressure, squeezing, fullness, or pain.  Discomfort in other areas of the upper body. Symptoms can include pain or discomfort in one or both arms, the back, neck, jaw, or stomach.  Shortness of breath with or without chest discomfort.  Other signs may include breaking out in a cold sweat, nausea, or lightheadedness. Don't wait more than five minutes to call 911 - MINUTES MATTER! Fast action can save your life. Calling 911 is almost always the fastest way to get lifesaving treatment. Emergency Medical Services staff can begin treatment when they arrive -- up to an hour sooner than if someone gets to the hospital by car. The discharge information has been reviewed with the patient.   The patient verbalized understanding. Discharge medications reviewed with the patient and appropriate educational materials and side effects teaching were provided. Fatigue: Care Instructions  Your Care Instructions  Fatigue is a feeling of tiredness, exhaustion, or lack of energy. You may feel fatigue because of too much or not enough activity. It can also come from stress, lack of sleep, boredom, and poor diet. Many medical problems, such as viral infections, can cause fatigue. Emotional problems, especially depression, are often the cause of fatigue. Fatigue is most often a symptom of another problem. Treatment for fatigue depends on the cause. For example, if you have fatigue because you have a certain health problem, treating this problem also treats your fatigue. If depression or anxiety is the cause, treatment may help. Follow-up care is a key part of your treatment and safety. Be sure to make and go to all appointments, and call your doctor if you are having problems. It's also a good idea to know your test results and keep a list of the medicines you take. How can you care for yourself at home? · Get regular exercise. But don't overdo it. Go back and forth between rest and exercise. · Get plenty of rest.  · Eat a healthy diet. Do not skip meals, especially breakfast.  · Reduce your use of caffeine, tobacco, and alcohol. Caffeine is most often found in coffee, tea, cola drinks, and chocolate. · Limit medicines that can cause fatigue. This includes tranquilizers and cold and allergy medicines. When should you call for help? Watch closely for changes in your health, and be sure to contact your doctor if:  · You have new symptoms such as fever or a rash. · Your fatigue gets worse. · You have been feeling down, depressed, or hopeless. Or you may have lost interest in things that you usually enjoy. · You are not getting better as expected. Where can you learn more?   Go to http://jose-lynn.info/. Enter N618 in the search box to learn more about \"Fatigue: Care Instructions. \"  Current as of: March 20, 2017  Content Version: 11.3  © 8318-8526 [employer]. Care instructions adapted under license by [employer] (which disclaims liability or warranty for this information). If you have questions about a medical condition or this instruction, always ask your healthcare professional. [address] any warranty or liability for your use of this information. Weakness: Care Instructions  Your Care Instructions  Weakness is a lack of physical or muscle strength. You may feel that you need to make extra effort to move your arms, legs, or other muscles. Generalized weakness means that you feel weak in most areas of your body. Another type of weakness may affect just one muscle or group of muscles. You may feel weak and tired after you have done too much activity, such as taking an extra-long hike. This is not a serious problem. It often goes away on its own. Feeling weak can also be caused by medical conditions like thyroid problems, depression, or a virus. Sometimes the cause can be serious. Your doctor may want to do more tests to try to find the cause of the weakness. The doctor has checked you carefully, but problems can develop later. If you notice any problems or new symptoms, get medical treatment right away. Follow-up care is a key part of your treatment and safety. Be sure to make and go to all appointments, and call your doctor if you are having problems. It's also a good idea to know your test results and keep a list of the medicines you take. How can you care for yourself at home? · Rest when you feel tired. · Be safe with medicines. If your doctor prescribed medicine, take it exactly as prescribed. Call your doctor if you think you are having a problem with your medicine.  You will get more details on the specific medicines your doctor prescribes. · Do not skip meals. Eating a balanced diet may increase your energy level. · Get some physical activity every day, but do not get too tired. When should you call for help? Call your doctor now or seek immediate medical care if:  · You have new or worse weakness. · You are dizzy or lightheaded, or you feel like you may faint. Watch closely for changes in your health, and be sure to contact your doctor if:  · You do not get better as expected. Where can you learn more? Go to http://jose-lynn.info/. Enter [id number] in the search box to learn more about \"Weakness: Care Instructions. \"  Current as of: March 20, 2017  Content Version: 11.3  © 3743-6162 [employer]. Care instructions adapted under license by [employer] (which disclaims liability or warranty for this information). If you have questions about a medical condition or this instruction, always ask your healthcare professional. [address] any warranty or liability for your use of this information.

## 2017-09-06 NOTE — ADT AUTH CERT NOTES
Neurology [address] Day 4 (9/4/2017) by Camilo Holland RN        Review Status Review Entered       Completed 9/5/2017       Details              Care Day: 4 Care Date: 9/4/2017 Level of Care: Inpatient Floor       Guideline Day 3        Level Of Care       ( ) * Activity level acceptable       9/5/2017 4:30 PM EDT by Mely Kam         PT/OT consulted              ( ) * Complete discharge planning              Clinical Status       (X) * No infection, or status acceptable       9/5/2017 4:30 PM EDT by Mely Kam         NONE              (X) * Isolation not needed, or status acceptable       9/5/2017 4:30 PM EDT by Mely Kam         NONE              (X) * Respiratory status acceptable       9/5/2017 4:30 PM EDT by Mely Kam         RR 18              (X) * Pain and nausea absent or adequately managed       (X) * Ventilatory status acceptable       9/5/2017 4:30 PM EDT by Mely Kam         SAT 97% 2L NC              (X) * Neurologic problems absent or stabilized       9/5/2017 4:30 PM EDT by Mely Kam         L sided weakness continues to improve UE > LE,            AOx3.  CN intact, LUE 4/5, LLE 3/5, R side 5/5              (X) * Muscle or nerve damage absent or stable       9/5/2017 4:30 PM EDT by Mely Kam         L sided weakness continues to improve UE > LE              ( ) * General Discharge Criteria met              Interventions       (X) * Intake acceptable       9/5/2017 4:30 PM EDT by Mely Kam         680 CC PO              ( ) * No inpatient interventions needed       9/5/2017 4:30 PM EDT by Mely Kam         DECADRON 4 MG  IV Q 6, IVF 75/HR, HEPARIN SQ, SSI, KEPPRA 1 GM IV Q 12, LOPRESSOR PO Q 12, MS IV, OXYCONTIN, PERCOCET, PROTONIX PO QD, HOME MEDS                     9/5/2017 4:30 PM EDT by Mely Kam       Subject: Additional Clinical Information       SUBJECTIVE:      Bhavya Porras is a 57MOQ with metastatic colon cancer (followed by  Lovetta Grade) who recently underwent abd surgery with Dr. Gregoria Sanders on 8/23. Has had worsening L sided weakness, found to have occipital mass with overlying vasogenic edema. Onc following. Plan for rad/onc to see tomorrow      9/4: L sided weakness continues to improve UE > LE. She has no complaints.  Denies HA, vision changes.       Plan:  - Metastatic colon cancer:  Recent resection with Dr. Hiral Read following for post-op wound care      - Brain tumor occipital lobe with cerebral edema:  MRI as above  Rad/onc consult pending  Continue steroids and keppra      - L sided weakness:  2/2 brain tumor  Improving with steroids  PT/OT consulted      - Tachycardia:   Resolved with IVF      - Hyperglycemia from steroids:  Continue SSI ACHS      DVT Prophylaxis: Hep SQ  Dispo: home when ok with oncology  /73, NSR 68, PAIN 0-9/10                                                  * Milestone                  Neurology [facility] - Care Day 3 (9/3/2017) by Ailyn Cazares RN        Review Status Review Entered       Completed 9/5/2017       Details              Care Day: 3 Care Date: 9/3/2017 Level of Care: Inpatient Floor       Guideline Day 3        Level Of Care       ( ) * Activity level acceptable       ( ) * Complete discharge planning              Clinical Status       (X) * No infection, or status acceptable       9/5/2017 4:23 PM EDT by [place] citizen          NO              (X) * Isolation not needed, or status acceptable       9/5/2017 4:23 PM EDT by [place] citizen          NO              ( ) * Respiratory status acceptable       9/5/2017 4:23 PM EDT by [place] citizen          SAT 96% 2L NC, RR 13-25              (X) * Pain and nausea absent or adequately managed       9/5/2017 4:23 PM EDT by [place] citizen          7/10              (X) * Ventilatory status acceptable       9/5/2017 4:23 PM EDT by [place] citizen          SAT 96% 2L NC              (X) * Neurologic problems absent or stabilized       9/5/2017 4:23 PM EDT by Nel Garay Lorri         AAOx3. CN II- XII grossly WNL. motor  Deficit and weakness L> R but improved, pt has responded really well to the steroids treatment  And is now able to [facility] her left extremity. Laruth Members is more alert  Today answering question and  Following commands.              (X) * Muscle or nerve damage absent or stable       9/5/2017 4:23 PM EDT by Edgar Thakur now able to [facility] her left extremity              ( ) * General Discharge Criteria met              Interventions       (X) * Intake acceptable       9/5/2017 4:23 PM EDT by Toan Gonzalez         300 CC PO              ( ) * No inpatient interventions needed       9/5/2017 4:23 PM EDT by Toan Gonzalez         DECADRON IV Q 6, IVF 75/HR, HEPARIN SQ, SSI, KEPPRA 1 GM IV Q 12,LOPRESSOR PO Q 12, OXYCONTIN, PERCOCET, PROTONIX,                     9/5/2017 4:23 PM EDT by Toan Gonzalez       Subject: Additional Clinical Information       Plan:  Colon Cancer   With mets   Post   Resection records from [facility] reviewed pt failed chemotherapy  Post   Surgery   By   Dr Arik Adams  Cerebral edema   Concerning for metastasis brain tumor   MRI results noted at   This time the plan would be to   Follow up with   Radiation ONC  The family     Would   Also like PT   As an outpatient   As well, they would like to keep her home as long as possible  Oncology consult appreciated   Cont   steroids / [address]      Motor Weakness   Due to   Above   Edema   Improving   Will get pt/ot to   Evaluate   Cont   Steroids / [address]      Dehydration / tachycardia   Improving   Cont with  IV fluids d5ns @70ml /hr       Moderate protein deficiency   Supplements ordered       Hyperglycemia from steroids   Expected elevation    Will start insulin   Sliding scale              NSR-ST , /94         WBC: 7.7         RBC: 4.15         HGB: 10.6 (L)         HCT: 33.2 (L)         Potassium: 3.3 (L)         CO2: 33 (H)         Glucose: 135 (H)

## 2017-09-06 NOTE — TELEPHONE ENCOUNTER
SW received call from pt's daughter, Jean-Claude Palomares, requesting assistance with arranging ambulance transportation for pt to radiation oncology consult/CT simulation. SW offered choice of transportation services and she chose [facility]. KAREN confirmed with JAVI Guan and Melvina Barrera RN pt's appointment information at [facility] on [address] [place] [address] for 9/7 at 8:30 am. KAREN called [facility] and scheduled transportation  for 8:00 am and called pt's daughter back with  time. No other needs identified. SW intends to follow up PRN.

## 2017-09-07 NOTE — TELEPHONE ENCOUNTER
SW received call from pt's daughter stating she wanted to cancel pt's rad onc consult. SW called [facility] who stated pt's family had refused transportation this morning. SW called [facility] on [place] to cancel appointment. Pt received call from Jeanenne Kocher later stating that pt had rescheduled to 9/11 at 9 am and would need transportation. SW received referral from Triage RN [employer] stating that pt's  called triage requesting assistance with rehab facility admission. SW called pt's  who stated the facilities they prefer are not accepting pts and that he felt home health was sufficient. KAREN offered referral to [place] [facility] for added assistance and he verbalized agreement. He requested assistance with wheelchair transport to pt's appointment on 9/11. KAREN intends to follow up and schedule it with [facility]. No other needs at this time. KAREN encouraged him to call should need arise and he verbalized understanding. KAREN coordinated with JAVI Brock to put in order for [facility]. KAREN consulted with Candido Hou regarding pt's care and assistance needed.

## 2017-09-08 NOTE — TELEPHONE ENCOUNTER
KAREN called [place] and set up wheelchair transport for pt's radiation appointment on 9/11.  time scheduled for 8:00 am. KAREN called pt's  and provided transport information. He verbalized understanding. KAREN updated [facility] [employer] of pt's transport schedule. No other needs at this time. KAREN encouraged pt's  to call should any needs arise. He verbalized understanding. KAREN intends to follow up PRN.

## 2017-10-10 NOTE — PROGRESS NOTES
Pt here today for FUP post RT to the brain at [facility] on 10/2/17. Pt denies headaches, N/V. She is in a wheelchair and is c/o pain level of 8/10 in her rectal area. Pt appears very debilitated. She will have a Brain MRI in 3 mos. Pt stated that she would like to have Hospice, but her  is not in agreement.

## 2018-09-12 NOTE — PROGRESS NOTES
Pt here today for FUP post RT for bony mets to her left ilium. Pt has a primary colon cancer, and is still receiving chemo. Pt denies pain. RT was completed 3/8/17. Hpi Title: Evaluation of a Skin Lesion How Severe Are Your Spot(S)?: mild Have Your Spot(S) Been Treated In The Past?: has not been treated

## 2025-07-15 NOTE — PERIOP NOTES
TRANSFER - OUT REPORT:    Verbal report given to [employer] (name) on [place]  being transferred to [facility](unit) for routine post - op       Report consisted of patients Situation, Background, Assessment and   Recommendations(SBAR). Information from the following report(s) SBAR, OR Summary, Intake/Output and MAR was reviewed with the receiving nurse. Lines:   Venous Access Device 09/01/17 Upper chest (subclavicular area), left (Active)   Central Line Being Utilized Yes 9/2/2017  2:41 PM   Criteria for Appropriate Use Long term IV/antibiotic administration 9/2/2017  2:41 PM   Site Assessment Clean, dry, & intact 9/2/2017  2:41 PM   Date of Last Dressing Change 09/01/17 9/2/2017  7:45 AM   Dressing Status Clean, dry, & intact 9/2/2017  2:41 PM   Dressing Type Disk with Chlorhexadine gluconate (CHG) 9/2/2017  2:41 PM   Date Accessed (Medial Site) 09/01/17 9/2/2017  7:45 AM   Positive Blood Return (Medial Site) Yes 9/2/2017  7:45 AM   Action Taken (Medial Site) Flushed 9/2/2017  7:45 AM        Opportunity for questions and clarification was provided. Patient transported with:   O2 @ 2 liters    VTE prophylaxis orders have been written for [place]. Patient and family given floor number and nurses name. Family updated re: pt status after security code verified. Brief noted to be dry at this time. <-- Click to add NO pertinent Past Medical History

## (undated) DEVICE — Device

## (undated) DEVICE — NEEDLE HYPO 25GA L1.5IN BLU POLYPR HUB S STL REG BVL STR

## (undated) DEVICE — CONTAINER SPEC HISTOLOGY 900ML POLYPR

## (undated) DEVICE — SUTURE PERMAHAND SZ 2-0 L18IN NONABSORBABLE BLK L26MM SH C012D

## (undated) DEVICE — SUTURE VCRL SZ 3-0 L36IN ABSRB UD L36MM CT-1 1/2 CIR J944H

## (undated) DEVICE — SUTURE PERMAHAND SZ 0 L30IN NONABSORBABLE BLK SILK BRAID A306H

## (undated) DEVICE — CURVED, LARGE JAW, OPEN SEALER/DIVIDER NANO-COATED: Brand: LIGASURE IMPACT

## (undated) DEVICE — ECHELON CONTOUR GST RELOAD (BLUE) --

## (undated) DEVICE — COLOSTOMY/ILEOSTOMY KIT, FLEXWEAR: Brand: NEW IMAGE

## (undated) DEVICE — STERILE SLEEVE: Brand: CONVERTORS

## (undated) DEVICE — RELOAD STPL L75MM OPN H3.8MM CLS 1.5MM WIRE DIA0.2MM REG

## (undated) DEVICE — SUTURE PERMAHAND SZ 3-0 L30IN NONABSORBABLE BLK SILK BRAID A304H

## (undated) DEVICE — SLIM BODY SKIN STAPLER: Brand: APPOSE ULC

## (undated) DEVICE — INTENDED FOR TISSUE SEPARATION, AND OTHER PROCEDURES THAT REQUIRE A SHARP SURGICAL BLADE TO PUNCTURE OR CUT.: Brand: BARD-PARKER SAFETY BLADES SIZE 10, STERILE

## (undated) DEVICE — SPONGE LAP 18X18IN STRL -- 5/PK

## (undated) DEVICE — SUTURE PDS II SZ 1 L96IN ABSRB VLT TP-1 L65MM 1/2 CIR Z880G

## (undated) DEVICE — SUTURE PERMAHAND SZ 2-0 L12X18IN NONABSORBABLE BLK SILK A185H

## (undated) DEVICE — DRAPE IRRIG FLD WRM W44XL44IN W/ AORN STD PRTBL INTRATEMP

## (undated) DEVICE — INTENDED FOR TISSUE SEPARATION, AND OTHER PROCEDURES THAT REQUIRE A SHARP SURGICAL BLADE TO PUNCTURE OR CUT.: Brand: BARD-PARKER SAFETY BLADES SIZE 15, STERILE

## (undated) DEVICE — (D)SYR 10ML 1/5ML GRAD NSAF -- PKGING CHANGE USE ITEM 338027

## (undated) DEVICE — SMALL BOWL SET-LF: Brand: MEDLINE INDUSTRIES, INC.

## (undated) DEVICE — SHEET, T, LAPAROTOMY, STERILE: Brand: MEDLINE

## (undated) DEVICE — SUTURE VCRL SZ 0 L36IN ABSRB UD L36MM CT-1 1/2 CIR J946H

## (undated) DEVICE — SUTURE PERMAHAND SZ 2-0 L18IN NONABSORBABLE BLK L26MM PS 1588H

## (undated) DEVICE — DRAPE TWL SURG 16X26IN BLU ORB04] ALLCARE INC]

## (undated) DEVICE — SUTURE VCRL SZ 3-0 L27IN ABSRB UD L26MM SH 1/2 CIR J416H

## (undated) DEVICE — 2000CC GUARDIAN II: Brand: GUARDIAN

## (undated) DEVICE — DUAL LUMEN STOMACH TUBE: Brand: SALEM SUMP

## (undated) DEVICE — BLADE ELECTRODE: Brand: EDGE

## (undated) DEVICE — SURGICAL PROCEDURE PACK BASIC ST FRANCIS

## (undated) DEVICE — SUTURE VCRL SZ 3-0 L18IN ABSRB UD L26MM SH 1/2 CIR J864D

## (undated) DEVICE — MEDI-VAC YANKAUER SUCTION HANDLE W/BULBOUS TIP: Brand: CARDINAL HEALTH

## (undated) DEVICE — REM POLYHESIVE ADULT PATIENT RETURN ELECTRODE: Brand: VALLEYLAB

## (undated) DEVICE — Z DUPLICATE USE 2716240 STAPLER INT CUT LN L40MM STPL L51MM GRN CRV HD B FRM

## (undated) DEVICE — SUTURE PERMAHAND SZ 2-0 L30IN NONABSORBABLE BLK SILK W/O A305H

## (undated) DEVICE — TRAY CATH 16F URIN MTR LTX -- CONVERT TO ITEM 363111

## (undated) DEVICE — KENDALL SCD EXPRESS SLEEVES, KNEE LENGTH, MEDIUM: Brand: KENDALL SCD

## (undated) DEVICE — SUTURE PERMAHAND SZ 3-0 L18IN NONABSORBABLE BLK L26MM SH C013D

## (undated) DEVICE — STAPLER INT L75MM CUT LN L73MM STPL LN L77MM BLU B FRM 8

## (undated) DEVICE — (D)PREP SKN CHLRAPRP APPL 26ML -- CONVERT TO ITEM 371833

## (undated) DEVICE — DRAIN SURG L49IN DIA1/4IN 10IN H SIL W/O TRCR END PERF